# Patient Record
Sex: MALE | Race: WHITE | Employment: FULL TIME | URBAN - NONMETROPOLITAN AREA
[De-identification: names, ages, dates, MRNs, and addresses within clinical notes are randomized per-mention and may not be internally consistent; named-entity substitution may affect disease eponyms.]

---

## 2018-08-22 ENCOUNTER — OFFICE VISIT (OUTPATIENT)
Dept: ORTHOPEDIC SURGERY | Age: 58
End: 2018-08-22

## 2018-08-22 VITALS — WEIGHT: 257 LBS | BODY MASS INDEX: 30.34 KG/M2 | HEIGHT: 77 IN

## 2018-08-22 DIAGNOSIS — M25.511 ACUTE PAIN OF RIGHT SHOULDER: Primary | ICD-10-CM

## 2018-08-22 PROCEDURE — 99204 OFFICE O/P NEW MOD 45 MIN: CPT | Performed by: ORTHOPAEDIC SURGERY

## 2018-08-22 NOTE — PROGRESS NOTES
is equal bilaterally with supination pronation and flexion and extension  no winging no muscle atrophy. Special Tests:  Palpation demonstrates no swelling no effusion moderate pain. There is near full active and passive range of motion. Strength is weak and painful with internal rotation against resistance external rotation against resistance supraspinatus isolation against resistance. Shoulder shrug strength is 5 over 5 equal bilaterally. Radial ulnar and median nerve function is intact. Capillary refill is brisk. Sensation is intact from neck down to the fingers. Elbow motion finger and wrist motion is full equal bilaterally. Deep tendon reflexes of the Brachial radialis, biceps, tricepsAre all +2/4 equal bilaterally. Cervical spine range of motion is full without pain negative Spurling's test.  Load-and-shift test is negative. Crank test is negative. Apprehension and relocation is negative. Anterior and posterior glide are equal bilaterally. Negative sulcus sign. No signs of any significant multidirectional instability. There is no scapular winging. There is no muscle atrophy of the latissimus dorsi, the deltoid, the periscapular musculature,The trapezius musculature or the pectoralis musculature. Positive Neer's test, positive Abdi test, positive pain with crossarm elevation. Gait: normal gait     Reflex:    Deep tendon reflexes of the biceps, triceps, brachioradialis +2/4 equal bilaterally    Lower extremity reflexes:  +2/4 and equal bilaterally for patella and Achilles      Contralateral Shoulder exam: Palpation demonstrates no swelling no effusion no pain. There is full active and passive range of motion bilaterally. Strength is excellent with internal rotation against resistance external rotation against resistance supraspinatus isolation against resistance. Shoulder shrug strength is 5 over 5 equal bilaterally. Radial ulnar and median nerve function is intact.   Capillary refill is brisk. Elbow motion finger and wrist motion is full equal bilaterally. Deep tendon reflexes of the Brachial radialis, biceps, tricepsAre all +2/4 equal bilaterally. Cervical spine range of motion is full without pain negative Spurling's test.  Load-and-shift test is negative. Crank test is negative. Apprehension and relocation is negative. Anterior and posterior glide are equal bilaterally. Negative sulcus sign. No signs of any significant multidirectional instability. There is no scapular winging. There is no muscle atrophy of the latissimus dorsi, the deltoid, the periscapular musculature,The trapezius musculature or the pectoralis musculature. Negative Neer's test, negative Abdi test, no pain with crossarm elevation. Abduction --150 degrees  Flexion-- 180 degrees  Extension-- between 45-60 degrees  Latera/external  rotation --close to 90 degrees  Medial/ internal rotation -- between 70-90 degree    Cervical spine exam demonstrates no  Radiculopathy no reproduction of the symptomology. Range of motion is normal without pain or radiculopathy and does not cause shoulder pain. Cranial nerve exam:    1- smell-- patient states no Olfactory problem  2- visual acuity is intact  3- moves eyes, and pupils are reactive  4- extra-occular muscles are intact  5- facial sensation is intact no muscle atrophy  6- extra occular muscles are intact  7- mouth moist and facial expressions are intact  8- good hearing and no difficulty with recognition  9- patient has no difficulty swallowing  10- no difficulty breathing and no Gastrointestinal problems good cough   11- moves head with all motion and no swallowing problems  12- normal speech and tongue protrudes midline    Additional Examinations:  Left Upper Extremity: Examination of the left upper extremity does not show any tenderness, deformity or injury. Range of motion is unremarkable. There is no gross instability.   There are no rashes, ulcerations or lesions. Strength and tone are normal.  Thoracic Spine: Examination of the thoracic spine does not show any tenderness, deformity or injury. Range of motion is unremarkable. There is no gross instability. There are no rashes, ulcerations or lesions. Strength and tone are normal.  Neck: Examination of the neck does not show any tenderness, deformity or injury. Range of motion is unremarkable. There is no gross instability. There are no rashes, ulcerations or lesions. Strength and tone are normal.        IMPRESSION:    Diagnostic testing:  X-rays obtained and reviewed in office by myself : I reviewed multiple X-rays today of the right shoulder:  Anterior posterior, lateral, axillary:  Show no fracture, no dislocation, no signs of any masses or tumors, no significant glenohumeral arthritis, no significant a.c. 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 Office Procedures:  Orders Placed This Encounter   Procedures    XR SHOULDER RIGHT (MIN 2 VIEWS)       Previous Treatments:  Ice, rest, exercises, over-the-counter medications , X-ray, anti-inflammatories,    Differential Diagnoses: Impingement, AC joint osteoarthritis,  Rotator cuff tear, Labral tear, Instability, loose body,  Long head of bicep injury,  Glenohumeral osteoarthritis, AC joint separation, SLAP tear, Posterior labral tear, Anterior Labral tear, neck pathology, brachioplexis injury, muscle injury, neck radiculopathy, bone tumor, fracture,    Diagnosis rotator cuff tear right shoulder        Plan: (Medical Decision Making)    1. Medications - none at this time  2. PT - in the future  3. Further imaging - MRI  4. Follow up - after MRI       Markie Ortiz D.O. [address] [place] [address] 20 and Sports Medicine  [facility]  Board Certified  [employer] Team Physician      Disclaimer: \"This note was dictated with voice recognition software. Though review and correction are routine, we apologize for any errors. \"

## 2018-08-29 ENCOUNTER — OFFICE VISIT (OUTPATIENT)
Dept: ORTHOPEDIC SURGERY | Age: 58
End: 2018-08-29

## 2018-08-29 ENCOUNTER — TELEPHONE (OUTPATIENT)
Dept: ORTHOPEDIC SURGERY | Age: 58
End: 2018-08-29

## 2018-08-29 VITALS — HEIGHT: 77 IN | WEIGHT: 257 LBS | BODY MASS INDEX: 30.34 KG/M2

## 2018-08-29 DIAGNOSIS — M19.011 ARTHRITIS OF RIGHT ACROMIOCLAVICULAR JOINT: ICD-10-CM

## 2018-08-29 DIAGNOSIS — M75.111 INCOMPLETE TEAR OF RIGHT ROTATOR CUFF: ICD-10-CM

## 2018-08-29 DIAGNOSIS — M75.41 SUBACROMIAL IMPINGEMENT, RIGHT: Primary | ICD-10-CM

## 2018-08-29 DIAGNOSIS — M25.511 ACUTE PAIN OF RIGHT SHOULDER: Primary | ICD-10-CM

## 2018-08-29 DIAGNOSIS — M75.41 SUBACROMIAL IMPINGEMENT, RIGHT: ICD-10-CM

## 2018-08-29 DIAGNOSIS — M19.011 GLENOHUMERAL ARTHRITIS, RIGHT: ICD-10-CM

## 2018-08-29 PROCEDURE — 99214 OFFICE O/P EST MOD 30 MIN: CPT | Performed by: ORTHOPAEDIC SURGERY

## 2018-08-29 RX ORDER — OXYCODONE HYDROCHLORIDE 10 MG/1
10 TABLET ORAL EVERY 8 HOURS PRN
Qty: 21 TABLET | Refills: 0 | Status: SHIPPED | OUTPATIENT
Start: 2018-08-31 | End: 2018-09-07

## 2018-08-29 RX ORDER — MELOXICAM 15 MG/1
15 TABLET ORAL DAILY
Qty: 30 TABLET | Refills: 3 | Status: SHIPPED | OUTPATIENT
Start: 2018-08-31

## 2018-08-29 NOTE — TELEPHONE ENCOUNTER
Auth: NPR  Date: 8/31/18  Reference # [id number]  Type of SX: Outpatient  Location: [facility] [id number], [id number], [id number]   SX area: Rt shoulder

## 2018-08-29 NOTE — PROGRESS NOTES
8/29/18  History of Present Illness: Recheck evaluation of his right shoulder he's having significant dysfunction  Sri Edwards is a 65 y.o. male    Patient's chief complaint in the office today includes: Right shoulder pain and loss of motion    Location right Shoulder  Severity moderate  Duration just over 2 weeks  Associated sign/symptoms pain, loss of motion, loss of strength, inability to use it at all overhead or sleep    Medical History  Patient's medications, allergies, past medical, surgical, social and family histories were reviewed and updated as appropriate. Review of Systems  No rash  No numbness  No tingling  No fevers  No depression    Pertinent items are noted in HPI  Review of systems reviewed from Patient History Form dated on 8/22/18 and available in the patient's chart under the Media tab. No change in his medical history form                                         Examination    General Exam:   Vitals: Height 6' 5\" (1.956 m), weight 257 lb (116.6 kg). Constitutional: Patient is adequately groomed with no evidence of malnutrition  Mental Status: The patient is oriented to time, place and person. The patient's mood and affect are appropriate. PHYSICAL EXAM:      Shoulder Examination  Inspection:  No swelling, no deformity, no erythema, no drainage, no signs of infection     Palpation:  Palpation reveals no effusion minimal pain, no warmth,     Range of Motion:  genital range of motion with no crepitus passive motion to 95° of forward flextion    Strength:  Intact strength with internal and external rotation along with  supraspinatus isolation bilaterally, Shoulder shrug is 5 over 5 , cervical spine strength is excellent, flexion extension at the elbow is 5 over 5 wrist and hand strength is equal bilaterally no winging no muscle atrophy.    Palpation:  Lateral deltoid moderate pain to palpation  AC joint moderate pain to palopation  Mild pain Anterior pain.  There is full active and passive range of motion bilaterally. Strength is excellent with internal rotation against resistance external rotation against resistance supraspinatus isolation against resistance. Shoulder shrug strength is 5 over 5 equal bilaterally. Radial ulnar and median nerve function is intact. Capillary refill is brisk. Elbow motion finger and wrist motion is full equal bilaterally. Deep tendon reflexes of the Brachial radialis, biceps, tricepsAre all +2/4 equal bilaterally. Cervical spine range of motion is full without pain negative Spurling's test.  Load-and-shift test is negative. Crank test is negative. Apprehension and relocation is negative. Anterior and posterior glide are equal bilaterally. Negative sulcus sign. No signs of any significant multidirectional instability. There is no scapular winging. There is no muscle atrophy of the latissimus dorsi, the deltoid, the periscapular musculature,The trapezius musculature or the pectoralis musculature. Negative Neer's test, negative Abdi test, no pain with crossarm elevation. Abduction --150 degrees  Flexion-- 180 degrees  Extension-- between 45-60 degrees  Latera/external  rotation --close to 90 degrees  Medial/ internal rotation -- between 70-90 degree      Reflex: upper extremity: Deep tendon reflexes of the biceps, triceps, brachioradialis +2/4 equal bilaterally  Lower extremity: +2/4 and equal bilaterally for patella and Achilles    Additional Comments:       Cervical spine exam demonstrates no  Radiculopathy no reproduction of the symptomology. Range of motion is normal without pain or radiculopathy and does not cause shoulder pain. Additional Examinations:  Right Lower Extremity: Examination of the right lower extremity does not show any tenderness, deformity or injury. Range of motion is unremarkable. There is no gross instability. There are no rashes, ulcerations or lesions.   Strength and tone are normal.  Left Lower Extremity: Examination of the left lower extremity does not show any tenderness, deformity or injury. Range of motion is unremarkable. There is no gross instability. There are no rashes, ulcerations or lesions. Strength and tone are normal.  Left Upper Extremity: Examination of the left upper extremity does not show any tenderness, deformity or injury. Range of motion is unremarkable. There is no gross instability. There are no rashes, ulcerations or lesions. Strength and tone are normal.  Neck: Examination of the neck does not show any tenderness, deformity or injury. Range of motion is unremarkable. There is no gross instability. There are no rashes, ulcerations or lesions. Strength and tone are normal.    Past Surgical History:   Procedure Laterality Date    KNEE ARTHROSCOPY Right 11/1/13    PARTIAL MEDIAL MENISCECTOMY, SYNOVECTOMY, CHONDROPLASTY    NASAL FRACTURE SURGERY         Diagnostic Testing:      I independently and personally reviewed the films in the office today:  Views Right shoulder MRI reviewed in the office today    Body Part right shoulder  Impression right shoulder partial thickness rotator cuff tear, subacromial impingement and distal clavicle arthritis    Assessment:  The MRI does show significant debris in the joint some mild arthritic changes subacromial impingement, distal clavicle arthritis and a greater than 1 cm area of 50% partial-thickness rotator cuff tear    Impression: Due to his severe pain and dysfunction 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 Previous Treatments:  X-ray, MRI, exercises, injections, anti-inflammatories, ice    Treatment Plan:  I spent 15+ minutes, face to face, with the patient discussing and answering questions regarding the risks, benefits, and complications of shoulder arthroscopy surgery in detail.  We

## 2018-08-31 ENCOUNTER — HOSPITAL ENCOUNTER (OUTPATIENT)
Dept: SURGERY | Age: 58
Discharge: HOME | End: 2018-08-31
Attending: ORTHOPAEDIC SURGERY | Admitting: ORTHOPAEDIC SURGERY

## 2018-08-31 VITALS
TEMPERATURE: 97.3 F | RESPIRATION RATE: 16 BRPM | DIASTOLIC BLOOD PRESSURE: 72 MMHG | OXYGEN SATURATION: 99 % | BODY MASS INDEX: 28.15 KG/M2 | SYSTOLIC BLOOD PRESSURE: 133 MMHG | HEART RATE: 64 BPM | WEIGHT: 238.44 LBS | HEIGHT: 77 IN

## 2018-08-31 RX ORDER — OXYCODONE HYDROCHLORIDE 5 MG/1
5 TABLET ORAL PRN
Status: COMPLETED | OUTPATIENT
Start: 2018-08-31 | End: 2018-08-31

## 2018-08-31 RX ORDER — HYDROMORPHONE HCL 110MG/55ML
0.5 PATIENT CONTROLLED ANALGESIA SYRINGE INTRAVENOUS EVERY 5 MIN PRN
Status: DISCONTINUED | OUTPATIENT
Start: 2018-08-31 | End: 2018-09-01 | Stop reason: HOSPADM

## 2018-08-31 RX ORDER — HYDROMORPHONE HCL 110MG/55ML
0.25 PATIENT CONTROLLED ANALGESIA SYRINGE INTRAVENOUS EVERY 5 MIN PRN
Status: DISCONTINUED | OUTPATIENT
Start: 2018-08-31 | End: 2018-09-01 | Stop reason: HOSPADM

## 2018-08-31 RX ORDER — SODIUM CHLORIDE 9 MG/ML
INJECTION, SOLUTION INTRAVENOUS CONTINUOUS
Status: DISCONTINUED | OUTPATIENT
Start: 2018-08-31 | End: 2018-09-01 | Stop reason: HOSPADM

## 2018-08-31 RX ORDER — FENTANYL CITRATE 50 UG/ML
50 INJECTION, SOLUTION INTRAMUSCULAR; INTRAVENOUS EVERY 5 MIN PRN
Status: DISCONTINUED | OUTPATIENT
Start: 2018-08-31 | End: 2018-09-01 | Stop reason: HOSPADM

## 2018-08-31 RX ORDER — ACETAMINOPHEN 325 MG/1
650 TABLET ORAL EVERY 4 HOURS PRN
Status: DISCONTINUED | OUTPATIENT
Start: 2018-08-31 | End: 2018-09-01 | Stop reason: HOSPADM

## 2018-08-31 RX ORDER — SODIUM CHLORIDE 0.9 % (FLUSH) 0.9 %
10 SYRINGE (ML) INJECTION PRN
Status: DISCONTINUED | OUTPATIENT
Start: 2018-08-31 | End: 2018-09-01 | Stop reason: HOSPADM

## 2018-08-31 RX ORDER — OXYCODONE HYDROCHLORIDE 5 MG/1
10 TABLET ORAL PRN
Status: COMPLETED | OUTPATIENT
Start: 2018-08-31 | End: 2018-08-31

## 2018-08-31 RX ORDER — LABETALOL HYDROCHLORIDE 5 MG/ML
5 INJECTION, SOLUTION INTRAVENOUS EVERY 10 MIN PRN
Status: DISCONTINUED | OUTPATIENT
Start: 2018-08-31 | End: 2018-09-01 | Stop reason: HOSPADM

## 2018-08-31 RX ORDER — SODIUM CHLORIDE 0.9 % (FLUSH) 0.9 %
10 SYRINGE (ML) INJECTION EVERY 12 HOURS SCHEDULED
Status: DISCONTINUED | OUTPATIENT
Start: 2018-08-31 | End: 2018-09-01 | Stop reason: HOSPADM

## 2018-08-31 RX ORDER — MEPERIDINE HYDROCHLORIDE 25 MG/ML
12.5 INJECTION INTRAMUSCULAR; INTRAVENOUS; SUBCUTANEOUS EVERY 5 MIN PRN
Status: DISCONTINUED | OUTPATIENT
Start: 2018-08-31 | End: 2018-09-01 | Stop reason: HOSPADM

## 2018-08-31 RX ORDER — DIPHENHYDRAMINE HYDROCHLORIDE 50 MG/ML
12.5 INJECTION INTRAMUSCULAR; INTRAVENOUS
Status: ACTIVE | OUTPATIENT
Start: 2018-08-31 | End: 2018-08-31

## 2018-08-31 RX ORDER — PROMETHAZINE HYDROCHLORIDE 25 MG/ML
6.25 INJECTION, SOLUTION INTRAMUSCULAR; INTRAVENOUS PRN
Status: DISCONTINUED | OUTPATIENT
Start: 2018-08-31 | End: 2018-09-01 | Stop reason: HOSPADM

## 2018-08-31 RX ORDER — CEFAZOLIN SODIUM 2 G/100ML
2 INJECTION, SOLUTION INTRAVENOUS
Status: COMPLETED | OUTPATIENT
Start: 2018-08-31 | End: 2018-08-31

## 2018-08-31 RX ADMIN — OXYCODONE HYDROCHLORIDE 10 MG: 5 TABLET ORAL at 12:55

## 2018-08-31 RX ADMIN — CEFAZOLIN SODIUM 2 G: 2 INJECTION, SOLUTION INTRAVENOUS at 10:39

## 2018-08-31 RX ADMIN — SODIUM CHLORIDE: 9 INJECTION, SOLUTION INTRAVENOUS at 09:35

## 2018-08-31 ASSESSMENT — PAIN - FUNCTIONAL ASSESSMENT: PAIN_FUNCTIONAL_ASSESSMENT: 0-10

## 2018-08-31 ASSESSMENT — PAIN DESCRIPTION - DESCRIPTORS: DESCRIPTORS: ACHING;SHARP;SHOOTING;THROBBING

## 2018-08-31 ASSESSMENT — PAIN SCALES - GENERAL: PAINLEVEL_OUTOF10: 7

## 2018-08-31 NOTE — OP NOTE
procedure and recovery along with postoperative follow-up plans the patient was brought back to the operative suite put on the operative table in the supine position. Following the patient's general anesthesia. The patient was sat up in the beachchair positioner. A pillow was put under the knees and taped in place the flank pads were tightened and a nonoperative arm pillow was applied. The neck and head positioner was carefully adjusted and tightened for optimal safety. At this point the operative arm was scrubbed with alcohol and Betadine and injected with 30 mL of Marcaine and 30 mL of lidocaine lidocaine did have epinephrine. I now performed a full physical exam of the shoulder under anesthesia for range of motion internal rotation, external rotation, forward flexion, I also tested anterior and posterior glide I tested for sulcus sign bilaterally and I also tested for instability. Following this the entire upper extremity was scrubbed with DuraPrep and draped in a sterile manner. The arm was hooked up to [facility] and with gentle traction a posterior portal was placed using a sharp scalpel and a blunt trocar entering the joint without any difficulty. With careful visualization of the entire intra-articular joint a anterior portal was made with an 18-gauge spinal needle sharp scalpel and a blunt trocar. The probe was used to probe all of the intra-articular pathology including the labrum, the long head of the biceps, the rotator cuff, the glenohumeral joint, the inferior recess, and the chondral surface. After the initial diagnostic arthroscopy I went ahead and used the shaver and the bipolar through the anterior portal to remove hypertrophic synovitis, labral fraying, and undersurface rotator cuff fraying. The synovitis was removed from the inferior recess posterior to the labrum superior to the labrum and anterior to the labrum.   The labral tissue was smoothed off with a shaver with a 5.5 mm barrel bur. The subacromial space was visualized through the anterior portal lateral portal and the posterior portal into bleeding tissue was cauterized any loose debris was removed with the shaver once all this was performed I removed all instruments from the subacromial space. Having seeing that the undersurface rotator cuff had a partial thickness tear I went ahead and augmented the rotator cuff with a large Regeneten augmentation graft using soft tissue staples at each corner and then in the middle in the center. This was a nice stable construct right over the rotator cuff partial-thickness tear. I irrigated copiously and removed all instruments from the subacromial space. At this point all instruments were removed from the shoulder each portal was closed with a simple interrupted suture. Each portal was covered with a sterile Band-Aid sterile gauze and ABD then tape. The patient was brought to recovery in stable condition. The Patient was given typewritten instructions for postoperative care. The patient was given exercises, pain medication, anti-inflammatory medication, a follow-up appointment in the office in 1 week. The patient was given instructions and a number to call if there is any concerns or problems. The patient will be discharged to home from the postoperative area when in stable condition and understands the instructions. _____________________         Yaneli Ortiz MS, DO         Orthopedic Surgeon          [facility] trained         Board-certified         Team Physician for [employer]

## 2018-08-31 NOTE — PROGRESS NOTES
CLINICAL PHARMACY NOTE: MEDS TO [address] Select Patient?: No  Total # of Prescriptions Filled: 2   The following medications were delivered to the patient:  · Oxycodone 5  · Mobic 15  Total # of Interventions Completed: 0  Time Spent (min): 15    Additional Documentation:  Patient's wife signed for prescriptions

## 2018-08-31 NOTE — ANESTHESIA POST-OP
[facility] Anesthesiology  Post-Anesthesia Note       Name:  Thomas Peterson                                         Age:  65 y.o. MRN:  [id number]     Last Vitals & Oxygen Saturation: /72   Pulse 64   Temp 97.3 °F (36.3 °C)   Resp 16   Ht 6' 5\" (1.956 m)   Wt 238 lb 7 oz (108.2 kg)   SpO2 99%   BMI 28.27 kg/m²   Patient Vitals for the past 4 hrs:   BP Temp Temp src Pulse Resp SpO2   08/31/18 1345 133/72 97.3 °F (36.3 °C) - 64 16 99 %   08/31/18 1315 137/74 97 °F (36.1 °C) - 59 16 99 %   08/31/18 1250 121/89 97.3 °F (36.3 °C) - 59 15 100 %   08/31/18 1245 122/74 - - 62 14 98 %   08/31/18 1240 119/71 - - 63 14 100 %   08/31/18 1236 120/66 97 °F (36.1 °C) Temporal 64 14 100 %       Level of consciousness:  Awake, alert    Respiratory: Respirations easy, no distress. Stable. Cardiovascular: Hemodynamically stable. Hydration: Adequate. PONV: Adequately managed. Post-op pain: Adequately controlled. Post-op assessment: Tolerated anesthetic well without complication. Complications:  None.     Sobia Link MD  August 31, 2018   2:45 PM

## 2018-09-04 DIAGNOSIS — M75.111 INCOMPLETE TEAR OF RIGHT ROTATOR CUFF: Primary | ICD-10-CM

## 2018-09-04 DIAGNOSIS — M19.011 ARTHRITIS OF RIGHT ACROMIOCLAVICULAR JOINT: ICD-10-CM

## 2018-09-04 DIAGNOSIS — M75.41 SUBACROMIAL IMPINGEMENT, RIGHT: ICD-10-CM

## 2018-09-04 DIAGNOSIS — M19.011 GLENOHUMERAL ARTHRITIS, RIGHT: ICD-10-CM

## 2018-09-05 ENCOUNTER — OFFICE VISIT (OUTPATIENT)
Dept: ORTHOPEDIC SURGERY | Age: 58
End: 2018-09-05

## 2018-09-05 VITALS — BODY MASS INDEX: 28.19 KG/M2 | HEIGHT: 77 IN | WEIGHT: 238.76 LBS

## 2018-09-05 DIAGNOSIS — M75.111 INCOMPLETE TEAR OF RIGHT ROTATOR CUFF: Primary | ICD-10-CM

## 2018-09-05 DIAGNOSIS — M19.011 ARTHRITIS OF RIGHT ACROMIOCLAVICULAR JOINT: ICD-10-CM

## 2018-09-05 DIAGNOSIS — M75.41 SUBACROMIAL IMPINGEMENT, RIGHT: ICD-10-CM

## 2018-09-05 PROCEDURE — 99024 POSTOP FOLLOW-UP VISIT: CPT | Performed by: ORTHOPAEDIC SURGERY

## 2018-09-11 ENCOUNTER — HOSPITAL ENCOUNTER (OUTPATIENT)
Dept: PHYSICAL THERAPY | Age: 58
Discharge: HOME | End: 2018-09-12
Admitting: ORTHOPAEDIC SURGERY

## 2018-09-12 NOTE — FLOWSHEET NOTE
[address] [place] [address]  Phone: [phone] Fax: [phone]      Physical Therapy Daily Treatment Note  Date:  2018    Patient Name:  Francy Sam    :  1960  MRN: [id number]  Restrictions/Precautions:    Medical/Treatment Diagnosis Information:  · Diagnosis: M75.111 (ICD-10-CM) - Incomplete tear of right rotator cuff  · Treatment Diagnosis: M75.111 (ICD-10-CM) - Incomplete tear of right rotator cuff  Insurance/Certification information:     Physician Information:  Referring Practitioner: Dr Denis Ram of care signed (Y/N):     Date of Patient follow up with Physician:     G-Code (if applicable):      Date G-Code Applied:    PT G-Codes  Functional Assessment Tool Used: FOTO  Score: 45  Functional Limitation: Carrying, moving and handling objects  Carrying, Moving and Handling Objects Current Status (): At least 40 percent but less than 60 percent impaired, limited or restricted  Carrying, Moving and Handling Objects Goal Status (): At least 1 percent but less than 20 percent impaired, limited or restricted    Progress Note: [x]  Yes  []  No  Next due by: Visit #10      Latex Allergy:  [x]NO      []YES  Preferred Language for Healthcare:   [x]English       []other:    Visit # Insurance Allowable   2 20     Pain level:  0-5/10     SUBJECTIVE:  Patient reports out of sling this am per MD.  He is doing well, minor soreness with wrong movements.   Unable to attend Thursday due to another obligation    OBJECTIVE: See eval  Observation:   Test measurements:      RESTRICTIONS/PRECAUTIONS: RC repair w augment patch 18    Exercises/Interventions:   Therapeutic Ex Sets/sec Reps Notes   UBE      Pendulum/Ball rolls      Cane AAROM flex/press 1 10 ER at 45 deg and flex   3 way Isomet      T- band Row/pinch      T- band lower pinch      T- band ER activation      Supine SA punch      SL ER/SL punch      Prone Rows/ext      Prone HAB/Prone Flex      Seat Table slides/Wall Slides 1 20    Seated HH Depression      No Money      Scap Wall Lat touches/wall walks            Standing flex/scap      Standing Punch      Lawnmower                              Manual Intervention      Shld /GH Mobs      Post Cap mobs      Thoracic/Rib manipualtion      CT MT/Mobs      PROM MT 25 min     Elbow mobs            NMR re-education      T-spine Ext      GH depress/compress      Scap/GH NMR      Body blade      Wall ball roll      Wall Ball bounce      Ball drops      Mari Scap Bio      Floor Snow angels-sliders                      Therapeutic Exercise and NMR EXR  [x] (84073) Provided verbal/tactile cueing for activities related to strengthening, flexibility, endurance, ROM  for improvements in scapular, scapulothoracic and UE control with self care, reaching, carrying, lifting, house/yardwork, driving/computer work. [x] (51645) Provided verbal/tactile cueing for activities related to improving balance, coordination, kinesthetic sense, posture, motor skill, proprioception  to assist with  scapular, scapulothoracic and UE control with self care, reaching, carrying, lifting, house/yardwork, driving/computer work. Therapeutic Activities:    [] (25074 or 49860) Provided verbal/tactile cueing for activities related to improving balance, coordination, kinesthetic sense, posture, motor skill, proprioception and motor activation to allow for proper function of scapular, scapulothoracic and UE control with self care, carrying, lifting, driving/computer work.      Home Exercise Program:    [x] (20670) Reviewed/Progressed HEP activities related to strengthening, flexibility, endurance, ROM of scapular, scapulothoracic and UE control with self care, reaching, carrying, lifting, house/yardwork, driving/computer work  [] (60554) Reviewed/Progressed HEP activities related to improving balance, coordination, kinesthetic sense, posture, motor skill, proprioception of scapular, scapulothoracic and UE control with self care, reaching, carrying, lifting, house/yardwork, driving/computer work      Manual Treatments:  PROM / STM / Oscillations-Mobs:  G-I, II, III, IV (PA's, Inf., Post.)  [] (90523) Provided manual therapy to mobilize soft tissue/joints of cervical/CT, scapular GHJ and UE for the purpose of modulating pain, promoting relaxation,  increasing ROM, reducing/eliminating soft tissue swelling/inflammation/restriction, improving soft tissue extensibility and allowing for proper ROM for normal function with self care, reaching, carrying, lifting, house/yardwork, driving/computer work    Modalities:      Charges:  Timed Code Treatment Minutes: 45   Total Treatment Minutes: 55     [] EVAL (LOW) 42475 (typically 20 minutes face-to-face)  [] EVAL (MOD) 90341 (typically 30 minutes face-to-face)  [] EVAL (HIGH) 21406 (typically 45 minutes face-to-face)  [] RE-EVAL     [x] DZ(54661) x      [] IONTO  [] NMR (91267) x      [] VASO  [x] Manual (09293) x       [] Other:  [] TA x       [] Mech Traction (97151)  [] ES(attended) (91514)      [] ES (un) (41111):     GOALS:    Patient stated goal: return to sport     Therapist goals for Patient:   Short Term Goals: To be achieved in: 2 weeks  1. Independent in HEP and progression per patient tolerance, in order to prevent re-injury. 2. Patient will have a decrease in pain to facilitate improvement in movement, function, and ADLs as indicated by Functional Deficits.     Long Term Goals: To be achieved in: 8 weeks  1. Disability index score of 75% or more on FOTOto assist with reaching prior level of function. 2. Patient will demonstrate increased AROM to [facility] to allow for proper joint functioning as indicated by patients Functional Deficits.    3. Patient will demonstrate an increase in Strength to good scapular and core control, w/in 5lbs HHD for UE to allow for proper functional mobility as indicated by patients

## 2018-09-18 ENCOUNTER — HOSPITAL ENCOUNTER (OUTPATIENT)
Dept: PHYSICAL THERAPY | Age: 58
Discharge: HOME | End: 2018-09-19
Admitting: ORTHOPAEDIC SURGERY

## 2018-09-19 NOTE — FLOWSHEET NOTE
slides/Wall Slides 1 20    Seated HH Depression      No Money      Scap Wall Lat touches/wall walks            Standing flex/scap      Standing Punch      Lawnmower                              Manual Intervention      Shld /GH Mobs 5 min  Inf glide grade II   Post Cap mobs      Thoracic/Rib manipualtion      CT MT/Mobs      PROM MT 20 min     Elbow mobs            NMR re-education      T-spine Ext      GH depress/compress      Scap/GH NMR      Body blade      Wall ball roll      Wall Ball bounce      Ball drops      Mari Scap Bio      Floor Snow angels-sliders                      Therapeutic Exercise and NMR EXR  [x] (53959) Provided verbal/tactile cueing for activities related to strengthening, flexibility, endurance, ROM  for improvements in scapular, scapulothoracic and UE control with self care, reaching, carrying, lifting, house/yardwork, driving/computer work. [x] (45341) Provided verbal/tactile cueing for activities related to improving balance, coordination, kinesthetic sense, posture, motor skill, proprioception  to assist with  scapular, scapulothoracic and UE control with self care, reaching, carrying, lifting, house/yardwork, driving/computer work. Therapeutic Activities:    [] (38802 or 83118) Provided verbal/tactile cueing for activities related to improving balance, coordination, kinesthetic sense, posture, motor skill, proprioception and motor activation to allow for proper function of scapular, scapulothoracic and UE control with self care, carrying, lifting, driving/computer work.      Home Exercise Program:    [x] (92380) Reviewed/Progressed HEP activities related to strengthening, flexibility, endurance, ROM of scapular, scapulothoracic and UE control with self care, reaching, carrying, lifting, house/yardwork, driving/computer work  [] (47782) Reviewed/Progressed HEP activities related to improving balance, coordination, kinesthetic sense, posture, motor skill, proprioception of scapular, will return to basketball functional activities without increased symptoms or restriction. 5. Sleep without pain(patient specific functional goal)    Progression Towards Functional goals:  [] Patient is progressing as expected towards functional goals listed. [] Progression is slowed due to complexities listed. [] Progression has been slowed due to co-morbidities. [x] Plan just implemented, too soon to assess goals progression  [] Other:     ASSESSMENT:  Pt PROM much less guarded today. Flexion mobility improving, started loading cuff with isometrics with good tolerance.     Return to Play: (if applicable)   []  Stage 1: Intro to Strength   []  Stage 2: Dynamic Strength and Intro to Plyometrics   []  Stage 3: Advanced Plyometrics and Intro to Throwing   []  Stage 4: Sport specific Training/Return to Sport     []  Ready to Return to Play, [employer]   []  Not Ready for Return to Sports   Comments:      Treatment/Activity Tolerance:  [x] Patient tolerated treatment well [] Patient limited by fatique  [] Patient limited by pain  [] Patient limited by other medical complications  [] Other:     Prognosis: [x] Good [] Fair  [] Poor    Patient Requires Follow-up: [x] Yes  [] No    PLAN: See eval  [] Continue per plan of care [] Alter current plan (see comments)  [x] Plan of care initiated [] Hold pending MD visit [] Discharge    Electronically signed by: Jesse Wiley PT

## 2018-09-20 ENCOUNTER — HOSPITAL ENCOUNTER (OUTPATIENT)
Dept: PHYSICAL THERAPY | Age: 58
Discharge: HOME | End: 2018-09-21
Admitting: ORTHOPAEDIC SURGERY

## 2018-09-21 NOTE — FLOWSHEET NOTE
ER/SL punch 2 15 0 lb   Prone Rows/ext 1 20 0 lb   Prone HAB/Prone Flex      Seat Table slides/Wall Slides 1 20    Seated HH Depression      No Money      Scap Wall Lat touches/wall walks            Standing flex/scap      Standing Punch      Lawnmower 1 20 3 lb                           Manual Intervention      Shld /GH Mobs 0 min  Inf glide grade II   Post Cap mobs      Thoracic/Rib manipualtion      CT MT/Mobs      PROM MT 15 min     Elbow mobs            NMR re-education      T-spine Ext      GH depress/compress      Scap/GH NMR      Body blade      Wall ball roll      Wall Ball bounce      Ball drops      Mari Scap Bio      Floor Snow angels-sliders                      Therapeutic Exercise and NMR EXR  [x] (08722) Provided verbal/tactile cueing for activities related to strengthening, flexibility, endurance, ROM  for improvements in scapular, scapulothoracic and UE control with self care, reaching, carrying, lifting, house/yardwork, driving/computer work. [x] (58632) Provided verbal/tactile cueing for activities related to improving balance, coordination, kinesthetic sense, posture, motor skill, proprioception  to assist with  scapular, scapulothoracic and UE control with self care, reaching, carrying, lifting, house/yardwork, driving/computer work. Therapeutic Activities:    [] (53481 or 68489) Provided verbal/tactile cueing for activities related to improving balance, coordination, kinesthetic sense, posture, motor skill, proprioception and motor activation to allow for proper function of scapular, scapulothoracic and UE control with self care, carrying, lifting, driving/computer work.      Home Exercise Program:    [x] (95118) Reviewed/Progressed HEP activities related to strengthening, flexibility, endurance, ROM of scapular, scapulothoracic and UE control with self care, reaching, carrying, lifting, house/yardwork, driving/computer work  [] (29551) Reviewed/Progressed HEP activities related to to allow for proper functional mobility as indicated by patients Functional Deficits. 4. Patient will return to basketball functional activities without increased symptoms or restriction. 5. Sleep without pain(patient specific functional goal)    Progression Towards Functional goals:  [] Patient is progressing as expected towards functional goals listed. [] Progression is slowed due to complexities listed. [] Progression has been slowed due to co-morbidities. [x] Plan just implemented, too soon to assess goals progression  [] Other:     ASSESSMENT:  ROM progressing well. Mild activation of cuff in neutral today.   Good session    Return to Play: (if applicable)   []  Stage 1: Intro to Strength   []  Stage 2: Dynamic Strength and Intro to Plyometrics   []  Stage 3: Advanced Plyometrics and Intro to Throwing   []  Stage 4: Sport specific Training/Return to Sport     []  Ready to Return to Play, [employer]   []  Not Ready for Return to Sports   Comments:      Treatment/Activity Tolerance:  [x] Patient tolerated treatment well [] Patient limited by fatique  [] Patient limited by pain  [] Patient limited by other medical complications  [] Other:     Prognosis: [x] Good [] Fair  [] Poor    Patient Requires Follow-up: [x] Yes  [] No    PLAN: See eval  [] Continue per plan of care [] Alter current plan (see comments)  [x] Plan of care initiated [] Hold pending MD visit [] Discharge    Electronically signed by: Graciela Sainz PT

## 2018-09-25 ENCOUNTER — HOSPITAL ENCOUNTER (OUTPATIENT)
Dept: PHYSICAL THERAPY | Age: 58
Setting detail: THERAPIES SERIES
Discharge: HOME | End: 2018-09-25
Admitting: ORTHOPAEDIC SURGERY
Payer: COMMERCIAL

## 2018-09-25 PROCEDURE — 97140 MANUAL THERAPY 1/> REGIONS: CPT

## 2018-09-25 PROCEDURE — 97110 THERAPEUTIC EXERCISES: CPT

## 2018-09-25 NOTE — FLOWSHEET NOTE
[facility] ER/IR   T- band Row/pinch 1 20 blue   T- band lower pinch 1 20 Blue, cues for scap retraction   T- band ER activation To neutral   Supine rhythmic stabilization 10s 3 90 deg   Supine SA punch 5s 15    SL ER/SL punch 2 10 0 lb   Prone Rows/ext 1 20 0 lb   Prone HAB/Prone Flex      Seat Table slides/Wall Slides 1 20    Seated HH Depression 5s 15    No Money      Scap Wall Lat touches/wall walks            Standing flex/scap      Standing Punch      Lawnmower 1 20 3 lb                           Manual Intervention      Shld /GH Mobs 0 min  Inf glide grade II   Post Cap mobs      Thoracic/Rib manipualtion      CT MT/Mobs      PROM MT 15 min     Elbow mobs            NMR re-education      T-spine Ext      GH depress/compress      Scap/GH NMR      Body blade      Wall ball roll      Wall Ball bounce      Ball drops      Mari Scap Bio      Floor Snow angels-sliders                      Therapeutic Exercise and NMR EXR  [x] (05912) Provided verbal/tactile cueing for activities related to strengthening, flexibility, endurance, ROM  for improvements in scapular, scapulothoracic and UE control with self care, reaching, carrying, lifting, house/yardwork, driving/computer work. [x] (28463) Provided verbal/tactile cueing for activities related to improving balance, coordination, kinesthetic sense, posture, motor skill, proprioception  to assist with  scapular, scapulothoracic and UE control with self care, reaching, carrying, lifting, house/yardwork, driving/computer work. Therapeutic Activities:    [] (20366 or 04554) Provided verbal/tactile cueing for activities related to improving balance, coordination, kinesthetic sense, posture, motor skill, proprioception and motor activation to allow for proper function of scapular, scapulothoracic and UE control with self care, carrying, lifting, driving/computer work.      Home Exercise Program:    [x] (28538) Reviewed/Progressed HEP activities related to strengthening,

## 2018-09-26 ENCOUNTER — OFFICE VISIT (OUTPATIENT)
Dept: ORTHOPEDIC SURGERY | Age: 58
End: 2018-09-26

## 2018-09-26 VITALS
BODY MASS INDEX: 28.1 KG/M2 | SYSTOLIC BLOOD PRESSURE: 130 MMHG | DIASTOLIC BLOOD PRESSURE: 78 MMHG | WEIGHT: 238 LBS | HEIGHT: 77 IN

## 2018-09-26 DIAGNOSIS — M75.111 INCOMPLETE TEAR OF RIGHT ROTATOR CUFF: Primary | ICD-10-CM

## 2018-09-26 DIAGNOSIS — M75.41 SUBACROMIAL IMPINGEMENT, RIGHT: ICD-10-CM

## 2018-09-26 DIAGNOSIS — M19.011 ARTHRITIS OF RIGHT ACROMIOCLAVICULAR JOINT: ICD-10-CM

## 2018-09-26 PROCEDURE — 99024 POSTOP FOLLOW-UP VISIT: CPT | Performed by: ORTHOPAEDIC SURGERY

## 2018-09-27 ENCOUNTER — HOSPITAL ENCOUNTER (OUTPATIENT)
Dept: PHYSICAL THERAPY | Age: 58
Setting detail: THERAPIES SERIES
Discharge: HOME | End: 2018-09-27
Admitting: ORTHOPAEDIC SURGERY
Payer: COMMERCIAL

## 2018-09-27 PROCEDURE — 97140 MANUAL THERAPY 1/> REGIONS: CPT

## 2018-09-27 PROCEDURE — 97110 THERAPEUTIC EXERCISES: CPT

## 2018-09-27 NOTE — FLOWSHEET NOTE
Red To neutral   Supine rhythmic stabilization 10s 3 90 deg   Supine SA punch 5s 15    SL ER/SL punch 2 10 0 lb   Prone Rows/ext 1 20 0 lb   Prone HAB/Prone Flex      Seat Table slides/Wall Slides 1 20    Seated HH Depression 5s 15    No Money      Scap Wall Lat touches/wall walks            Standing flex/scap      Standing Punch      Lawnmower 1 20 3 lb                           Manual Intervention      Shld /GH Mobs 0 min  Inf glide grade II   Post Cap mobs      Thoracic/Rib manipualtion      CT MT/Mobs      PROM MT 15 min     Elbow mobs            NMR re-education      T-spine Ext      GH depress/compress      Scap/GH NMR      Body blade      Wall ball roll      Wall Ball bounce      Ball drops      Mari Scap Bio      Floor Snow angels-sliders                      Therapeutic Exercise and NMR EXR  [x] (38557) Provided verbal/tactile cueing for activities related to strengthening, flexibility, endurance, ROM  for improvements in scapular, scapulothoracic and UE control with self care, reaching, carrying, lifting, house/yardwork, driving/computer work. [x] (01796) Provided verbal/tactile cueing for activities related to improving balance, coordination, kinesthetic sense, posture, motor skill, proprioception  to assist with  scapular, scapulothoracic and UE control with self care, reaching, carrying, lifting, house/yardwork, driving/computer work. Therapeutic Activities:    [] (98158 or 29519) Provided verbal/tactile cueing for activities related to improving balance, coordination, kinesthetic sense, posture, motor skill, proprioception and motor activation to allow for proper function of scapular, scapulothoracic and UE control with self care, carrying, lifting, driving/computer work.      Home Exercise Program:    [x] (99998) Reviewed/Progressed HEP activities related to strengthening, flexibility, endurance, ROM of scapular, scapulothoracic and UE control with self care, reaching, carrying, lifting, house/yardwork, driving/computer work  [] (05666) Reviewed/Progressed HEP activities related to improving balance, coordination, kinesthetic sense, posture, motor skill, proprioception of scapular, scapulothoracic and UE control with self care, reaching, carrying, lifting, house/yardwork, driving/computer work      Manual Treatments:  PROM / STM / Oscillations-Mobs:  G-I, II, III, IV (PA's, Inf., Post.)  [] ([id number]) Provided manual therapy to mobilize soft tissue/joints of cervical/CT, scapular GHJ and UE for the purpose of modulating pain, promoting relaxation,  increasing ROM, reducing/eliminating soft tissue swelling/inflammation/restriction, improving soft tissue extensibility and allowing for proper ROM for normal function with self care, reaching, carrying, lifting, house/yardwork, driving/computer work    Modalities:  Ice bag x 10 min. Charges:  Timed Code Treatment Minutes: 45   Total Treatment Minutes: 55     [] EVAL (LOW) 89161 (typically 20 minutes face-to-face)  [] EVAL (MOD) 04641 (typically 30 minutes face-to-face)  [] EVAL (HIGH) 62508 (typically 45 minutes face-to-face)  [] RE-EVAL     [x] OA(19804) x  2   [] IONTO  [] NMR (20203) x      [] VASO  [x] Manual (75495) x  1    [] Other:  [] TA x       [] Mech Traction (78388)  [] ES(attended) (41901)      [] ES (un) (68734):     GOALS:    Patient stated goal: return to sport     Therapist goals for Patient:   Short Term Goals: To be achieved in: 2 weeks  1. Independent in HEP and progression per patient tolerance, in order to prevent re-injury. 2. Patient will have a decrease in pain to facilitate improvement in movement, function, and ADLs as indicated by Functional Deficits.     Long Term Goals: To be achieved in: 8 weeks  1. Disability index score of 75% or more on FOTOto assist with reaching prior level of function.    2. Patient will demonstrate increased AROM to [facility] to allow for proper joint functioning as indicated by patients Functional

## 2018-10-02 ENCOUNTER — HOSPITAL ENCOUNTER (OUTPATIENT)
Dept: PHYSICAL THERAPY | Age: 58
Setting detail: THERAPIES SERIES
Discharge: HOME | End: 2018-10-02
Admitting: ORTHOPAEDIC SURGERY
Payer: COMMERCIAL

## 2018-10-02 PROCEDURE — 97110 THERAPEUTIC EXERCISES: CPT

## 2018-10-02 PROCEDURE — 97140 MANUAL THERAPY 1/> REGIONS: CPT

## 2018-10-02 NOTE — FLOWSHEET NOTE
activation 1 15 Red To neutral   Supine rhythmic stabilization 10s 3 90 deg   Supine SA punch 5s 15    SL ER/SL punch 2 10 2 lb ER/0 lb   Prone Rows/ext 1 20 2 lb   Prone HAB/Prone Flex 1 12 0 lb   Seat Table slides/Wall Slides 1 20    Seated HH Depression 5s 15    No Money      Scap Wall Lat touches/wall walks      Hor add st 20 sec 4    Standing flex/scap      Standing Punch      Lawnmower 1 20 3 lb   Ball rolls 90 deg  1 20 Small green ball                     Manual Intervention      Shld /GH Mobs 0 min  Inf glide grade II   Post Cap mobs      Thoracic/Rib manipualtion      CT MT/Mobs      PROM MT 15 min     Elbow mobs            NMR re-education      T-spine Ext      GH depress/compress      Scap/GH NMR      Body blade      Wall ball roll      Wall Ball bounce      Ball drops      Mari Scap Bio      Floor Snow angels-sliders                      Therapeutic Exercise and NMR EXR  [x] (26692) Provided verbal/tactile cueing for activities related to strengthening, flexibility, endurance, ROM  for improvements in scapular, scapulothoracic and UE control with self care, reaching, carrying, lifting, house/yardwork, driving/computer work. [x] (64449) Provided verbal/tactile cueing for activities related to improving balance, coordination, kinesthetic sense, posture, motor skill, proprioception  to assist with  scapular, scapulothoracic and UE control with self care, reaching, carrying, lifting, house/yardwork, driving/computer work. Therapeutic Activities:    [] (40857 or 57144) Provided verbal/tactile cueing for activities related to improving balance, coordination, kinesthetic sense, posture, motor skill, proprioception and motor activation to allow for proper function of scapular, scapulothoracic and UE control with self care, carrying, lifting, driving/computer work.      Home Exercise Program:    [x] (33416) Reviewed/Progressed HEP activities related to strengthening, flexibility, endurance, ROM of scapular,

## 2018-10-09 ENCOUNTER — HOSPITAL ENCOUNTER (OUTPATIENT)
Dept: PHYSICAL THERAPY | Age: 58
Setting detail: THERAPIES SERIES
Discharge: HOME | End: 2018-10-09
Admitting: ORTHOPAEDIC SURGERY
Payer: COMMERCIAL

## 2018-10-09 PROCEDURE — 97140 MANUAL THERAPY 1/> REGIONS: CPT

## 2018-10-09 PROCEDURE — 97110 THERAPEUTIC EXERCISES: CPT

## 2018-10-09 NOTE — FLOWSHEET NOTE
[facility] [address] [facility]  Phone: [phone] Fax: [phone]      Physical Therapy Daily Treatment Note  Date:  10/9/2018    Patient Name:  Tosin Garcia    :  1960  MRN: [id number]  Restrictions/Precautions:    Medical/Treatment Diagnosis Information:  · Diagnosis: M75.111 (ICD-10-CM) - Incomplete tear of right rotator cuff  · Treatment Diagnosis: M75.111 (ICD-10-CM) - Incomplete tear of right rotator cuff  Insurance/Certification information:     Physician Information:  Referring Practitioner: Dr Dori Castelan of care signed (Y/N):     Date of Patient follow up with Physician:     G-Code (if applicable):      Date G-Code Applied:    PT G-Codes  Functional Assessment Tool Used: FOTO  Score: 45  Functional Limitation: Carrying, moving and handling objects  Carrying, Moving and Handling Objects Current Status (): At least 40 percent but less than 60 percent impaired, limited or restricted  Carrying, Moving and Handling Objects Goal Status (): At least 1 percent but less than 20 percent impaired, limited or restricted    Progress Note: [x]  Yes  []  No  Next due by: Visit #10      Latex Allergy:  [x]NO      []YES  Preferred Language for Healthcare:   [x]English       []other:    Visit # Insurance Allowable   8 20     Pain level:  0-5/10     SUBJECTIVE:  Patient reports shooting free throws with only fatigue. Pain contiues to decrease overall.   Good progress     OBJECTIVE: See eval  Observation:   Test measurements:      RESTRICTIONS/PRECAUTIONS: RC repair w augment patch 18    Exercises/Interventions:   Therapeutic Ex x 30 min Sets/sec Reps Notes   UBE 5 min retro  low   Pendulum/Ball rolls      Cane AAROM flex/press 1 10     3 way [facility] ER/IR   T- band Row/pinch 1 20 blue   T- band lower pinch 1 20 Blue, cues for scap retraction   T- band ER activation 1 15 blue   Supine rhythmic stabilization 10s 3 90 deg Supine SA punch 5s 15    SL ER/SL punch 2 10 2 lb ER/0 lb   Prone Rows/ext 1 20 3 lb   Prone HAB/Prone Flex 1 12 0 lb   Seat Table slides/Wall Slides 1 20    Seated HH Depression 5s 15    No Money      Scap Wall Lat touches/wall walks      Hor add st 20 sec 4    Standing flex/scap      Standing Punch      Lawnmower 1 20 3 lb   Ball rolls 90 deg  1 20 Small green ball   bodyblade 15 sec 5 Small yellow               Manual Intervention      Shld /GH Mobs 0 min  Inf glide grade II   Post Cap mobs      Thoracic/Rib manipualtion      CT MT/Mobs      PROM MT 15 min     Elbow mobs            NMR re-education      T-spine Ext      GH depress/compress      Scap/GH NMR      Body blade      Wall ball roll      Wall Ball bounce      Ball drops      Mari Scap Bio      Floor Snow angels-sliders                      Therapeutic Exercise and NMR EXR  [x] (54705) Provided verbal/tactile cueing for activities related to strengthening, flexibility, endurance, ROM  for improvements in scapular, scapulothoracic and UE control with self care, reaching, carrying, lifting, house/yardwork, driving/computer work. [x] (27996) Provided verbal/tactile cueing for activities related to improving balance, coordination, kinesthetic sense, posture, motor skill, proprioception  to assist with  scapular, scapulothoracic and UE control with self care, reaching, carrying, lifting, house/yardwork, driving/computer work. Therapeutic Activities:    [] (16706 or 52735) Provided verbal/tactile cueing for activities related to improving balance, coordination, kinesthetic sense, posture, motor skill, proprioception and motor activation to allow for proper function of scapular, scapulothoracic and UE control with self care, carrying, lifting, driving/computer work.      Home Exercise Program:    [x] (73438) Reviewed/Progressed HEP activities related to strengthening, flexibility, endurance, ROM of scapular, scapulothoracic and UE control with self care,

## 2018-10-11 ENCOUNTER — HOSPITAL ENCOUNTER (OUTPATIENT)
Dept: PHYSICAL THERAPY | Age: 58
Setting detail: THERAPIES SERIES
Discharge: HOME | End: 2018-10-11
Admitting: ORTHOPAEDIC SURGERY
Payer: COMMERCIAL

## 2018-10-11 PROCEDURE — 97110 THERAPEUTIC EXERCISES: CPT

## 2018-10-11 PROCEDURE — 97140 MANUAL THERAPY 1/> REGIONS: CPT

## 2018-10-11 NOTE — FLOWSHEET NOTE
[facility] [address] [facility], [place] [address]  Phone: [phone] Fax: [phone]      Physical Therapy Daily Treatment Note  Date:  10/11/2018    Patient Name:  Angelic Cruz    :  1960  MRN: [id number]  Restrictions/Precautions:    Medical/Treatment Diagnosis Information:  · Diagnosis: M75.111 (ICD-10-CM) - Incomplete tear of right rotator cuff  · Treatment Diagnosis: M75.111 (ICD-10-CM) - Incomplete tear of right rotator cuff  Insurance/Certification information:     Physician Information:  Referring Practitioner: Dr Shirley Whitaker of care signed (Y/N):     Date of Patient follow up with Physician:     G-Code (if applicable):      Date G-Code Applied:    PT G-Codes  Functional Assessment Tool Used: FOTO  Score: 45  Functional Limitation: Carrying, moving and handling objects  Carrying, Moving and Handling Objects Current Status (): At least 40 percent but less than 60 percent impaired, limited or restricted  Carrying, Moving and Handling Objects Goal Status (): At least 1 percent but less than 20 percent impaired, limited or restricted    Progress Note: [x]  Yes  []  No  Next due by: Visit #10      Latex Allergy:  [x]NO      []YES  Preferred Language for Healthcare:   [x]English       []other:    Visit # Insurance Allowable   9 20     Pain level:  0-5/10     SUBJECTIVE:  Patient reports shooting free throws with only fatigue.  Did a good deal of activity with netting and no issues    OBJECTIVE: See eval  Observation:   Test measurements:      RESTRICTIONS/PRECAUTIONS: RC repair w augment patch 18    Exercises/Interventions:   Therapeutic Ex x 30 min Sets/sec Reps Notes   UBE 5 min retro  low   Pendulum/Ball rolls      Cane AAROM flex/press 1 10     3 way Isomet ER/IR   T- band Row/pinch 1 20 blue   T- band lower pinch 1 20 Blue, cues for scap retraction   T- band ER activation 1 15 blue   Supine rhythmic stabilization 10s 3 90 deg   Supine SA punch 5s 15    SL ER/SL punch 2 10 2 lb ER/0 lb   Prone Rows/ext 1 20 3 lb   Prone HAB/Prone Flex 1 12 0 lb   Seat Table slides/Wall Slides 1 20    Seated HH Depression 5s 15    No Money      Scap Wall Lat touches/wall walks      Hor add st 20 sec 4    Standing flex/scap      Standing Punch      Lawnmower 1 20 3 lb   Ball rolls 90 deg  1 20 Small green ball   bodyblade 15 sec 5 Small yellow   Cable ER/IR 2 12 2/3 pl         Manual Intervention      Shld /GH Mobs 0 min  Inf glide grade II/scap mob   Post Cap mobs      Thoracic/Rib manipualtion      CT MT/Mobs      PROM MT 15 min     Elbow mobs            NMR re-education      T-spine Ext      GH depress/compress      Scap/GH NMR      Body blade      Wall ball roll      Wall Ball bounce      Ball drops      Mari Scap Bio      Floor Snow angels-sliders                      Therapeutic Exercise and NMR EXR  [x] (72935) Provided verbal/tactile cueing for activities related to strengthening, flexibility, endurance, ROM  for improvements in scapular, scapulothoracic and UE control with self care, reaching, carrying, lifting, house/yardwork, driving/computer work. [x] (28253) Provided verbal/tactile cueing for activities related to improving balance, coordination, kinesthetic sense, posture, motor skill, proprioception  to assist with  scapular, scapulothoracic and UE control with self care, reaching, carrying, lifting, house/yardwork, driving/computer work. Therapeutic Activities:    [] (40171 or 78828) Provided verbal/tactile cueing for activities related to improving balance, coordination, kinesthetic sense, posture, motor skill, proprioception and motor activation to allow for proper function of scapular, scapulothoracic and UE control with self care, carrying, lifting, driving/computer work.      Home Exercise Program:    [x] (60450) Reviewed/Progressed HEP activities related to strengthening, flexibility, endurance, ROM of scapular, scapulothoracic and UE control with self care, reaching, carrying, lifting, house/yardwork, driving/computer work  [] (17073) Reviewed/Progressed HEP activities related to improving balance, coordination, kinesthetic sense, posture, motor skill, proprioception of scapular, scapulothoracic and UE control with self care, reaching, carrying, lifting, house/yardwork, driving/computer work      Manual Treatments:  PROM / STM / Oscillations-Mobs:  G-I, II, III, IV (PA's, Inf., Post.)  [] ([id number]) Provided manual therapy to mobilize soft tissue/joints of cervical/CT, scapular GHJ and UE for the purpose of modulating pain, promoting relaxation,  increasing ROM, reducing/eliminating soft tissue swelling/inflammation/restriction, improving soft tissue extensibility and allowing for proper ROM for normal function with self care, reaching, carrying, lifting, house/yardwork, driving/computer work    Modalities:  Ice bag x 10 min. Charges:  Timed Code Treatment Minutes: 55   Total Treatment Minutes: 55     [] EVAL (LOW) 74153 (typically 20 minutes face-to-face)  [] EVAL (MOD) 97141 (typically 30 minutes face-to-face)  [] EVAL (HIGH) 07901 (typically 45 minutes face-to-face)  [] RE-EVAL     [x] XR(70626) x  2   [] IONTO  [] NMR (25566) x      [] VASO  [x] Manual (77604) x  1    [] Other:  [] TA x       [] Mech Traction (84459)  [] ES(attended) (63594)      [] ES (un) (19282):     GOALS:    Patient stated goal: return to sport     Therapist goals for Patient:   Short Term Goals: To be achieved in: 2 weeks  1. Independent in HEP and progression per patient tolerance, in order to prevent re-injury. 2. Patient will have a decrease in pain to facilitate improvement in movement, function, and ADLs as indicated by Functional Deficits.     Long Term Goals: To be achieved in: 8 weeks  1. Disability index score of 75% or more on FOTOto assist with reaching prior level of function.    2. Patient will demonstrate increased AROM to [facility] to allow for proper joint functioning as indicated by patients Functional Deficits. 3. Patient will demonstrate an increase in Strength to good scapular and core control, w/in 5lbs HHD for UE to allow for proper functional mobility as indicated by patients Functional Deficits. 4. Patient will return to basketball functional activities without increased symptoms or restriction. 5. Sleep without pain(patient specific functional goal)    Progression Towards Functional goals:  [] Patient is progressing as expected towards functional goals listed. [] Progression is slowed due to complexities listed. [] Progression has been slowed due to co-morbidities. [x] Plan just implemented, too soon to assess goals progression  [] Other:     ASSESSMENT:  ROM progressing well.  Mobility and strength continue to normalize     Return to Play: (if applicable)   []  Stage 1: Intro to Strength   []  Stage 2: Dynamic Strength and Intro to Plyometrics   []  Stage 3: Advanced Plyometrics and Intro to Throwing   []  Stage 4: Sport specific Training/Return to Sport     []  Ready to Return to Play, [employer]   []  Not Ready for Return to Sports   Comments:      Treatment/Activity Tolerance:  [x] Patient tolerated treatment well [] Patient limited by fatique  [] Patient limited by pain  [] Patient limited by other medical complications  [] Other:     Prognosis: [x] Good [] Fair  [] Poor    Patient Requires Follow-up: [x] Yes  [] No    PLAN: See eval  [x] Continue per plan of care [] Alter current plan (see comments)  [] Plan of care initiated [] Hold pending MD visit [] Discharge    Electronically signed by: Amy Santizo PT

## 2018-10-16 ENCOUNTER — HOSPITAL ENCOUNTER (OUTPATIENT)
Dept: PHYSICAL THERAPY | Age: 58
Setting detail: THERAPIES SERIES
Discharge: HOME | End: 2018-10-16
Admitting: ORTHOPAEDIC SURGERY
Payer: COMMERCIAL

## 2018-10-16 PROCEDURE — 97110 THERAPEUTIC EXERCISES: CPT

## 2018-10-16 NOTE — FLOWSHEET NOTE
in movement, function, and ADLs as indicated by Functional Deficits.     Long Term Goals: To be achieved in: 8 weeks  1. Disability index score of 75% or more on FOTOto assist with reaching prior level of function. 2. Patient will demonstrate [facility] to allow for proper joint functioning as indicated by patients Functional Deficits. 3. Patient will demonstrate an increase in Strength to good scapular and core control, w/in 5lbs HHD for UE to allow for proper functional mobility as indicated by patients Functional Deficits. 4. Patient will return to basketball functional activities without increased symptoms or restriction. 5. Sleep without pain(patient specific functional goal)    Progression Towards Functional goals:  [] Patient is progressing as expected towards functional goals listed. [] Progression is slowed due to complexities listed. [] Progression has been slowed due to co-morbidities. [x] Plan just implemented, too soon to assess goals progression  [] Other:     ASSESSMENT:  ROM progressing well. Mobility and strength continue to normalize. Pt doing well per protocol. Return to Play: (if applicable)   []  Stage 1: Intro to Strength   []  Stage 2: Dynamic Strength and Intro to Plyometrics   []  Stage 3: Advanced Plyometrics and Intro to Throwing   []  Stage 4: Sport specific Training/Return to Sport     []  Ready to Return to Play, [employer]   []  Not Ready for Return to Sports   Comments:      Treatment/Activity Tolerance:  [x] Patient tolerated treatment well [] Patient limited by fatique  [] Patient limited by pain  [] Patient limited by other medical complications  [] Other:     Prognosis: [x] Good [] Fair  [] Poor    Patient Requires Follow-up: [x] Yes  [] No    PLAN: Pt only here once this week per travel for work. Pt to resume 2x/week next week.    [x] Continue per plan of care [] Alter current plan (see comments)  [] Plan of care initiated [] Hold pending

## 2018-10-23 ENCOUNTER — HOSPITAL ENCOUNTER (OUTPATIENT)
Dept: PHYSICAL THERAPY | Age: 58
Setting detail: THERAPIES SERIES
Discharge: HOME | End: 2018-10-23
Admitting: ORTHOPAEDIC SURGERY
Payer: COMMERCIAL

## 2018-10-23 PROCEDURE — 97110 THERAPEUTIC EXERCISES: CPT

## 2018-10-23 PROCEDURE — 97140 MANUAL THERAPY 1/> REGIONS: CPT

## 2018-10-23 NOTE — FLOWSHEET NOTE
stretch 30s 3 90 deg   Cane AAROM flex 1 15 3lb stick   SC up-chops 2 10 sportcord wrapped around base of [employer] leg curl machine   TRX Rows 1 14 Partial range   Table push-up with single arm plus 2 10    T- band Row/pinch blue   T- band lower pinch Blue, cues for scap retraction   T- band ER activation blue   Supine rhythmic stabilization 90 deg   Supine SA punch    SL ER/SL punch 3 lb ER/2-3 lb   Prone Rows/ext 6-7 lb   Prone HAB/Prone Flex 0 lb   Seat Table slides/Wall Slides    Seated HH Depression 5s 15    No Money 5s 20 Red theraloop   Scap Wall Lat touches/wall walks      Hor add st 20 sec 4    Cable multi-directional pulls 2 10 Chest pass, extension (supinated and pronated ), HAB, flexion for shooting 3.5pl    Seated ER/IR control c ball 2 10 8inch box+pillow on tabletop, green sand ball, focusing on ecc control    Lawnmower 1 20 7 lb   Ball rolls 90 deg flex/scap 1 20 Small green ball   bodyblade 15 sec 5 Small black   Cable ER/IR 2 12 2/3 pl   Cable row/extension (single arm) 2 12 4-5pl   Manual Intervention x  10 min      Shld /GH Mobs 0 min  Inf glide grade II/scap mob   Post Cap mobs 4 min     Thoracic/Rib manipualtion      CT MT/Mobs      PROM MT 6 min     Elbow mobs            NMR re-education      T-spine Ext      GH depress/compress      Scap/GH NMR      Body blade      Wall ball roll      Wall Ball bounce      Ball drops      Mari Scap Bio      Floor Snow angels-sliders                      Therapeutic Exercise and NMR EXR  [x] (22639) Provided verbal/tactile cueing for activities related to strengthening, flexibility, endurance, ROM  for improvements in scapular, scapulothoracic and UE control with self care, reaching, carrying, lifting, house/yardwork, driving/computer work.     [x] (48015) Provided verbal/tactile cueing for activities related to improving balance, coordination, kinesthetic sense, posture, motor skill, proprioception  to assist with  scapular, scapulothoracic and UE control

## 2018-10-24 ENCOUNTER — OFFICE VISIT (OUTPATIENT)
Dept: ORTHOPEDIC SURGERY | Age: 58
End: 2018-10-24

## 2018-10-24 VITALS
HEIGHT: 77 IN | BODY MASS INDEX: 28.11 KG/M2 | SYSTOLIC BLOOD PRESSURE: 128 MMHG | WEIGHT: 238.1 LBS | DIASTOLIC BLOOD PRESSURE: 77 MMHG

## 2018-10-24 DIAGNOSIS — M75.111 INCOMPLETE TEAR OF RIGHT ROTATOR CUFF: Primary | ICD-10-CM

## 2018-10-24 PROCEDURE — 99024 POSTOP FOLLOW-UP VISIT: CPT | Performed by: ORTHOPAEDIC SURGERY

## 2018-10-25 ENCOUNTER — HOSPITAL ENCOUNTER (OUTPATIENT)
Dept: PHYSICAL THERAPY | Age: 58
Setting detail: THERAPIES SERIES
Discharge: HOME | End: 2018-10-25
Admitting: ORTHOPAEDIC SURGERY
Payer: COMMERCIAL

## 2018-10-25 PROCEDURE — 97110 THERAPEUTIC EXERCISES: CPT

## 2018-10-25 PROCEDURE — 97140 MANUAL THERAPY 1/> REGIONS: CPT

## 2018-10-25 NOTE — FLOWSHEET NOTE
band lower pinch Blue, cues for scap retraction   T- band ER activation blue   Supine rhythmic stabilization 90 deg   Supine SA punch    SL ER/SL punch 3 lb ER/2-3 lb   Prone Rows/ext 6-7 lb   Prone HAB/Prone Flex 0 lb   Seat Table slides/Wall Slides    Seated HH Depression 5s 15    No Money 5s 20 Red theraloop   Scap Wall Lat touches/wall walks      Hor add st 20 sec 4    Cable multi-directional pulls 2 10 Chest pass, extension (supinated and pronated ), HAB, flexion for shooting 3.5pl    Seated ER/IR control c ball 2 10 8inch box+pillow on tabletop, green sand ball, focusing on ecc control    Lawnmower 1 20 7 lb   Ball rolls 90 deg flex/scap 1 20 Small green ball   bodyblade 15 sec 5 Small black   Cable ER/IR 2 12 2/3 pl   Cable row/extension (single arm) 2 12 4-5pl   Manual Intervention x  10 min      Shld /GH Mobs 0 min  Inf glide grade II/scap mob   Post Cap mobs 4 min     Thoracic/Rib manipualtion      CT MT/Mobs      PROM MT 6 min     Elbow mobs            NMR re-education      T-spine Ext      GH depress/compress      Scap/GH NMR      Body blade      Wall ball roll      Wall Ball bounce      Ball drops      Mari Scap Bio      Floor Snow angels-sliders                      Therapeutic Exercise and NMR EXR  [x] (54196) Provided verbal/tactile cueing for activities related to strengthening, flexibility, endurance, ROM  for improvements in scapular, scapulothoracic and UE control with self care, reaching, carrying, lifting, house/yardwork, driving/computer work. [x] (12993) Provided verbal/tactile cueing for activities related to improving balance, coordination, kinesthetic sense, posture, motor skill, proprioception  to assist with  scapular, scapulothoracic and UE control with self care, reaching, carrying, lifting, house/yardwork, driving/computer work.     Therapeutic Activities:    [] (49910 or 49647) Provided verbal/tactile cueing for activities related to improving balance, coordination, kinesthetic sense, posture, motor skill, proprioception and motor activation to allow for proper function of scapular, scapulothoracic and UE control with self care, carrying, lifting, driving/computer work. Home Exercise Program:    [x] (95846) Reviewed/Progressed HEP activities related to strengthening, flexibility, endurance, ROM of scapular, scapulothoracic and UE control with self care, reaching, carrying, lifting, house/yardwork, driving/computer work  [] (51042) Reviewed/Progressed HEP activities related to improving balance, coordination, kinesthetic sense, posture, motor skill, proprioception of scapular, scapulothoracic and UE control with self care, reaching, carrying, lifting, house/yardwork, driving/computer work      Manual Treatments:  PROM / STM / Oscillations-Mobs:  G-I, II, III, IV (PA's, Inf., Post.)  [] (22750) Provided manual therapy to mobilize soft tissue/joints of cervical/CT, scapular GHJ and UE for the purpose of modulating pain, promoting relaxation,  increasing ROM, reducing/eliminating soft tissue swelling/inflammation/restriction, improving soft tissue extensibility and allowing for proper ROM for normal function with self care, reaching, carrying, lifting, house/yardwork, driving/computer work    Modalities:  Ice bag x 10 min. Charges:  Timed Code Treatment Minutes: 45   Total Treatment Minutes: 45     [] EVAL (LOW) 33756 (typically 20 minutes face-to-face)  [] EVAL (MOD) 95450 (typically 30 minutes face-to-face)  [] EVAL (HIGH) 46247 (typically 45 minutes face-to-face)  [] RE-EVAL     [x] MB(12396) x  3   [] IONTO  [] NMR (01282) x      [] VASO  [x] Manual (66672) x  1    [] Other:  [] TA x       [] Mech Traction (83261)  [] ES(attended) (43578)      [] ES (un) (08257):     GOALS:    Patient stated goal: return to sport     Therapist goals for Patient:   Short Term Goals: To be achieved in: 2 weeks  1.  Independent in HEP and progression per patient tolerance, in order to prevent re-injury. 2. Patient will have a decrease in pain to facilitate improvement in movement, function, and ADLs as indicated by Functional Deficits.     Long Term Goals: To be achieved in: 8 weeks  1. Disability index score of 75% or more on FOTOto assist with reaching prior level of function. 2. Patient will demonstrate increased [facility] to [facility] to allow for proper joint functioning as indicated by patients Functional Deficits. 3. Patient will demonstrate an increase in Strength to good scapular and core control, w/in 5lbs HHD for UE to allow for proper functional mobility as indicated by patients Functional Deficits. 4. Patient will return to basketball functional activities without increased symptoms or restriction. 5. Sleep without pain(patient specific functional goal)    Progression Towards Functional goals:  [] Patient is progressing as expected towards functional goals listed. [] Progression is slowed due to complexities listed. [] Progression has been slowed due to co-morbidities. [x] Plan just implemented, too soon to assess goals progression  [] Other:     ASSESSMENT:  ROM progressing well. Mobility and strength continue to normalize. Pt pushing the shoulder aggressively and has been cautioned about doing too much for the repair. Return to Play: (if applicable)   []  Stage 1: Intro to Strength   []  Stage 2: Dynamic Strength and Intro to Plyometrics   []  Stage 3: Advanced Plyometrics and Intro to Throwing   []  Stage 4: Sport specific Training/Return to Sport     []  Ready to Return to Play, [employer]   []  Not Ready for Return to Sports   Comments:      Treatment/Activity Tolerance:  [x] Patient tolerated treatment well [] Patient limited by fatique  [] Patient limited by pain  [] Patient limited by other medical complications  [] Other:     Prognosis: [x] Good [] Fair  [] Poor    Patient Requires Follow-up: [x] Yes  [] No    PLAN: Pt to see surgeon tomorrow.    [x] Continue per

## 2018-10-30 ENCOUNTER — HOSPITAL ENCOUNTER (OUTPATIENT)
Dept: PHYSICAL THERAPY | Age: 58
Setting detail: THERAPIES SERIES
Discharge: HOME | End: 2018-10-30
Admitting: ORTHOPAEDIC SURGERY
Payer: COMMERCIAL

## 2018-10-30 PROCEDURE — 97110 THERAPEUTIC EXERCISES: CPT

## 2018-10-30 PROCEDURE — 97140 MANUAL THERAPY 1/> REGIONS: CPT

## 2018-10-30 NOTE — FLOWSHEET NOTE
retraction   T- band ER activation blue   Supine rhythmic stabilization 90 deg   Supine SA punch    SL ER/SL punch 3 lb ER/2-3 lb   Prone Rows/ext 6-7 lb   Prone HAB/Prone Flex 0 lb   Seat Table slides/Wall Slides    Seated HH Depression 5s 15    No Money 5s 20 Red theraloop   Scap Wall Lat touches/wall walks      Hor add st 20 sec 4    Cable multi-directional pulls 2 10 Chest pass, extension (supinated and pronated ), HAB, flexion for shooting 3.5pl    Seated ER/IR control c ball 2 10 8inch box+pillow on tabletop, green sand ball, focusing on ecc control    Lawnmower 1 20 7 lb   Ball rolls 90 deg flex/scap 1 20 Small green ball   bodyblade 15 sec 5 Small black   Cable ER/IR 2 12 2/3 pl   [employer] with 11 lb ball 1 15 Both hands holding ball   Cable row/extension (single arm) 2 12 4-5pl   Manual Intervention x  10 min      Shld /GH Mobs 0 min  Inf glide grade II/scap mob   Post Cap mobs 4 min     Thoracic/Rib manipualtion      CT MT/Mobs      PROM MT 6 min     Elbow mobs            NMR re-education      T-spine Ext      GH depress/compress      Scap/GH NMR      Body blade      Wall ball roll      Wall Ball bounce      Ball drops      Mari Scap Bio      Floor Snow angels-sliders                      Therapeutic Exercise and NMR EXR  [x] (75231) Provided verbal/tactile cueing for activities related to strengthening, flexibility, endurance, ROM  for improvements in scapular, scapulothoracic and UE control with self care, reaching, carrying, lifting, house/yardwork, driving/computer work. [x] (64265) Provided verbal/tactile cueing for activities related to improving balance, coordination, kinesthetic sense, posture, motor skill, proprioception  to assist with  scapular, scapulothoracic and UE control with self care, reaching, carrying, lifting, house/yardwork, driving/computer work.     Therapeutic Activities:    [] (43407 or 20065) Provided verbal/tactile cueing for activities related to improving balance,

## 2018-11-01 ENCOUNTER — HOSPITAL ENCOUNTER (OUTPATIENT)
Dept: PHYSICAL THERAPY | Age: 58
Setting detail: THERAPIES SERIES
Discharge: HOME | End: 2018-11-01
Admitting: ORTHOPAEDIC SURGERY
Payer: COMMERCIAL

## 2018-11-01 PROCEDURE — 97110 THERAPEUTIC EXERCISES: CPT

## 2018-11-01 PROCEDURE — 97112 NEUROMUSCULAR REEDUCATION: CPT

## 2018-11-01 NOTE — FLOWSHEET NOTE
OBJECTIVE: See eval  Observation:   Test measurements:      RESTRICTIONS/PRECAUTIONS: RC repair w augment patch 8/31/18    Exercises/Interventions:   Therapeutic Ex x 40 min Sets/sec Reps Notes   UBE 4 min retro  low   Sleeper stretch 30s 3 90 deg   Cane AAROM flex 3lb stick   SC up-chops sportcord wrapped around base of [employer] leg curl machine   TRX Rows 1 14 Partial range   Table push-up with single arm plus 2 10 opp arm/leg hold at top   T- band Row/pinch blue   T- band lower pinch Blue, cues for scap retraction   Seated ER/IR c elbow on box 2 10 Red sand ball, slow, HHD   Supine rhythmic stabilization 90 deg   Supine SA punch    SL ER/SL punch 2 10 5-7lb   Prone Rows/ext 1 20 10-12lb   Prone HAB/Prone Flex 0 lb   Seat Table slides/Wall Slides    Seated HH Depression    No Money 5s 20 Red theraloop   Supine ball plyo's 2 10 11lb- single arm chest pass, double arm overhead pass   Hor add st 20 sec 4    Cable multi-directional pulls 2 10 Chest pass, extension (supinated and pronated ), HAB, flexion for shooting 3.5pl    Seated ER/IR control c ball 2 10 8inch box+pillow on tabletop, green sand ball, focusing on ecc control    Lawnmower 1 20 7 lb   Ball rolls 90 deg flex/scap 1 20 Small green ball   bodyblade Small black   Cable ER/IR 2 12 2/3 pl   [employer] with 11 lb ball 2 10 1/2 kneeling on BOSU, Both hands holding ball   Cable row/extension (single arm) 2 12 4-5pl   Manual Intervention x  10 min      Shld /GH Mobs 0 min  Inf glide grade II/scap mob   Post Cap mobs 4 min     Thoracic/Rib manipualtion      CT MT/Mobs      PROM MT 6 min     Elbow mobs            NMR re-education x 12 minutes      T-spine Ext      GH depress/compress            Body blade      Wall ball roll 2 20 Blue ball, blue band @ forearm   Wall Ball bounce      Ball drops 2 20 Red plyoball and red sand ball, standing and bent over   Mari Scap Bio      Floor Snow angels-sliders 2 10 Supine- limited on both sides lifting, house/yardwork, driving/computer work    Modalities:  Ice bag x 10 min. Charges:  Timed Code Treatment Minutes: 62   Total Treatment Minutes: 62     [] EVAL (LOW) 76134 (typically 20 minutes face-to-face)  [] EVAL (MOD) 75840 (typically 30 minutes face-to-face)  [] EVAL (HIGH) 23075 (typically 45 minutes face-to-face)  [] RE-EVAL     [x] MF(66720) x  3   [] IONTO  [x] NMR (83530) x  1   [] VASO  [] Manual (86649) x       [] Other:  [] TA x       [] Mech Traction (64409)  [] ES(attended) (53566)      [] ES (un) (93453):     GOALS:    Patient stated goal: return to sport     Therapist goals for Patient:   Short Term Goals: To be achieved in: 2 weeks  1. Independent in HEP and progression per patient tolerance, in order to prevent re-injury. 2. Patient will have a decrease in pain to facilitate improvement in movement, function, and ADLs as indicated by Functional Deficits.     Long Term Goals: To be achieved in: 8 weeks  1. Disability index score of 75% or more on FOTOto assist with reaching prior level of function. 2. Patient will demonstrate increased AROM to [facility] to allow for proper joint functioning as indicated by patients Functional Deficits. 3. Patient will demonstrate an increase in Strength to good scapular and core control, w/in 5lbs HHD for UE to allow for proper functional mobility as indicated by patients Functional Deficits. 4. Patient will return to basketball functional activities without increased symptoms or restriction. 5. Sleep without pain(patient specific functional goal)    Progression Towards Functional goals:  [] Patient is progressing as expected towards functional goals listed. [] Progression is slowed due to complexities listed. [] Progression has been slowed due to co-morbidities. [x] Plan just implemented, too soon to assess goals progression  [] Other:     ASSESSMENT:  ROM progressing well. Mobility and strength continue to normalize.  Pt pushing the shoulder aggressively and has been cautioned about doing too much for the repair.      Return to Play: (if applicable)   []  Stage 1: Intro to Strength   []  Stage 2: Dynamic Strength and Intro to Plyometrics   []  Stage 3: Advanced Plyometrics and Intro to Throwing   []  Stage 4: Sport specific Training/Return to Sport     []  Ready to Return to Play, [employer]   []  Not Ready for Return to Sports   Comments:      Treatment/Activity Tolerance:  [x] Patient tolerated treatment well [] Patient limited by fatique  [] Patient limited by pain  [] Patient limited by other medical complications  [] Other:     Prognosis: [x] Good [] Fair  [] Poor    Patient Requires Follow-up: [x] Yes  [] No    PLAN:   [x] Continue per plan of care [] Alter current plan (see comments)  [] Plan of care initiated [] Hold pending MD visit [] Discharge    Electronically signed by: Debra Raza PTA

## 2018-11-06 ENCOUNTER — HOSPITAL ENCOUNTER (OUTPATIENT)
Dept: PHYSICAL THERAPY | Age: 58
Setting detail: THERAPIES SERIES
Discharge: HOME | End: 2018-11-06
Admitting: ORTHOPAEDIC SURGERY
Payer: COMMERCIAL

## 2018-11-06 PROCEDURE — 97110 THERAPEUTIC EXERCISES: CPT

## 2018-11-06 PROCEDURE — 97140 MANUAL THERAPY 1/> REGIONS: CPT

## 2018-11-06 NOTE — FLOWSHEET NOTE
[facility] [address] [address]  Phone: [phone] Fax: [phone]      Physical Therapy Daily Treatment Note  Date:  2018    Patient Name:  Terra Miller    :  1960  MRN: [id number]  Restrictions/Precautions:    Medical/Treatment Diagnosis Information:  · Diagnosis: M75.111 (ICD-10-CM) - Incomplete tear of right rotator cuff  · Treatment Diagnosis: M75.111 (ICD-10-CM) - Incomplete tear of right rotator cuff  Insurance/Certification information:     Physician Information:  Referring Practitioner: Dr Dianne Whitlock of care signed (Y/N):     Date of Patient follow up with Physician:     G-Code (if applicable):      Date G-Code Applied:    PT G-Codes  Functional Assessment Tool Used: FOTO  Score: 45  Functional Limitation: Carrying, moving and handling objects  Carrying, Moving and Handling Objects Current Status (): At least 40 percent but less than 60 percent impaired, limited or restricted  Carrying, Moving and Handling Objects Goal Status (): At least 1 percent but less than 20 percent impaired, limited or restricted    Progress Note: [x]  Yes  []  No  Next due by: Visit #10      Latex Allergy:  [x]NO      []YES  Preferred Language for Healthcare:   [x]English       []other:    Visit # Insurance Allowable   15 20     Pain level:  0-5/10     SUBJECTIVE:  Patient reports he worked out lower body and some upper body on .   Stiff and sore in shoulder region this am.      OBJECTIVE: See eval  Observation:   Test measurements:      RESTRICTIONS/PRECAUTIONS: RC repair w augment patch 18    Exercises/Interventions:   Therapeutic Ex x 40 min Sets/sec Reps Notes   UBE 4 min retro  low   Sleeper stretch 30s 3 90 deg   Cane AAROM flex 3lb stick   SC up-chops sportcord wrapped around base of [employer] leg curl machine   TRX Rows 1 14 Partial range   Table push-up with single arm plus 2 10 opp arm/leg hold at top   T- to assist with  scapular, scapulothoracic and UE control with self care, reaching, carrying, lifting, house/yardwork, driving/computer work. Therapeutic Activities:    [] (22833 or 11661) Provided verbal/tactile cueing for activities related to improving balance, coordination, kinesthetic sense, posture, motor skill, proprioception and motor activation to allow for proper function of scapular, scapulothoracic and UE control with self care, carrying, lifting, driving/computer work. Home Exercise Program:    [x] (21027) Reviewed/Progressed HEP activities related to strengthening, flexibility, endurance, ROM of scapular, scapulothoracic and UE control with self care, reaching, carrying, lifting, house/yardwork, driving/computer work  [] (23999) Reviewed/Progressed HEP activities related to improving balance, coordination, kinesthetic sense, posture, motor skill, proprioception of scapular, scapulothoracic and UE control with self care, reaching, carrying, lifting, house/yardwork, driving/computer work      Manual Treatments:  PROM / STM / Oscillations-Mobs:  G-I, II, III, IV (PA's, Inf., Post.)  [] (92611) Provided manual therapy to mobilize soft tissue/joints of cervical/CT, scapular GHJ and UE for the purpose of modulating pain, promoting relaxation,  increasing ROM, reducing/eliminating soft tissue swelling/inflammation/restriction, improving soft tissue extensibility and allowing for proper ROM for normal function with self care, reaching, carrying, lifting, house/yardwork, driving/computer work    Modalities:  Ice bag x 10 min.      Charges:  Timed Code Treatment Minutes: 50   Total Treatment Minutes: 62     [] EVAL (LOW) 67345 (typically 20 minutes face-to-face)  [] EVAL (MOD) 87131 (typically 30 minutes face-to-face)  [] EVAL (HIGH) 84409 (typically 45 minutes face-to-face)  [] RE-EVAL     [x] W[place](37052) x  2   [] IONTO  [x] NMR (39959) x  1   [] VASO  [] Manual (35552) x       [] Other:  [] TA x       []

## 2018-11-08 ENCOUNTER — HOSPITAL ENCOUNTER (OUTPATIENT)
Dept: PHYSICAL THERAPY | Age: 58
Setting detail: THERAPIES SERIES
Discharge: HOME | End: 2018-11-08
Admitting: ORTHOPAEDIC SURGERY
Payer: COMMERCIAL

## 2018-11-08 PROCEDURE — 97110 THERAPEUTIC EXERCISES: CPT

## 2018-11-08 PROCEDURE — 97112 NEUROMUSCULAR REEDUCATION: CPT

## 2018-11-08 NOTE — FLOWSHEET NOTE
Intro to Throwing   []  Stage 4: Sport specific Training/Return to Sport     []  Ready to Return to Play, [employer]   []  Not Ready for Return to Sports   Comments:      Treatment/Activity Tolerance:  [x] Patient tolerated treatment well [] Patient limited by fatique  [] Patient limited by pain  [] Patient limited by other medical complications  [] Other:     Prognosis: [x] Good [] Fair  [] Poor    Patient Requires Follow-up: [x] Yes  [] No    PLAN:   [x] Continue per plan of care [] Alter current plan (see comments)  [] Plan of care initiated [] Hold pending MD visit [] Discharge    Electronically signed by: Westley Mayer PTA

## 2018-11-13 ENCOUNTER — HOSPITAL ENCOUNTER (OUTPATIENT)
Dept: PHYSICAL THERAPY | Age: 58
Setting detail: THERAPIES SERIES
Discharge: HOME | End: 2018-11-13
Admitting: ORTHOPAEDIC SURGERY
Payer: COMMERCIAL

## 2018-11-13 PROCEDURE — 97140 MANUAL THERAPY 1/> REGIONS: CPT

## 2018-11-13 PROCEDURE — 97112 NEUROMUSCULAR REEDUCATION: CPT

## 2018-11-13 PROCEDURE — 97110 THERAPEUTIC EXERCISES: CPT

## 2018-11-13 NOTE — FLOWSHEET NOTE
lb  Scaption/Flex L 30 lb, R 22 lb    ROM symmetrical except end range flexion on R is limited.  v      RESTRICTIONS/PRECAUTIONS: RC repair w augment patch 8/31/18    Exercises/Interventions:   Therapeutic Ex x 32 min Sets/sec Reps Notes   UBE 4 min retro  low   Sleeper stretch 30s 3 90 deg   Supine flexion/scaption 2 10 3lb   SC up-chops sportcord wrapped around base of [employer] leg curl machine   TRX Rows 1 14 Partial range   Table push-up with single arm plus 2 10 opp arm/leg hold at top   T- band Row/pinch blue   T- band lower pinch Blue, cues for scap retraction   Seated ER/IR c elbow on box 2 10 Red sand ball, slow, HHD   Supine rhythmic stabilization 90 deg   Supine SA punch    SL ER/SL punch 2 10 5-7lb   Prone Rows/ext 1 20 10-12lb   Prone HAB/Prone Flex 0 lb   Seat Table slides/Wall Slides    Seated HH Depression    No Money 5s 20 Red theraloop+ wall walks   Supine ball plyo's 2 10 6-11lb- single arm chest pass, double arm overhead pass   Hor add st 20 sec 4    Cable multi-directional pulls 2 10 Chest pass, extension (supinated and pronated ), HAB, flexion for shooting 3.5pl    Seated ER/IR control c ball 2 10 8inch box+pillow on tabletop, green sand ball, focusing on ecc control    Lawnmower 1 20 7 lb   Ball rolls 90 deg flex/scap 1 20 Small green ball   bodyblade Small black   Cable ER/IR 2 12 2/3 pl   [employer] with 11 lb ball 1/2 kneeling on BOSU, Both hands holding ball   Cable row/extension (single arm) 2 12 4-5pl   Manual Intervention x  10 min      Shld /GH Mobs 0 min  Inf glide grade IV/scap mob   Post/Inf Cap mobs 8 min     Thoracic/Rib manipualtion      CT MT/Mobs      PROM MT 8 min     Elbow mobs            NMR re-education x 14 minutes      T-spine Ext      GH depress/compress            Body blade      Wall ball roll 2 20 Blue ball, blue band @ forearm   Wall Ball bounce      Ball drops 2 20 Red plyoball and red sand ball, standing and bent over   [facility] well. Mobility and strength continue to normalize. Pt pushing the shoulder aggressively and has been cautioned about doing too much for the repair.      Return to Play: (if applicable)   []  Stage 1: Intro to Strength   []  Stage 2: Dynamic Strength and Intro to Plyometrics   []  Stage 3: Advanced Plyometrics and Intro to Throwing   []  Stage 4: Sport specific Training/Return to Sport     []  Ready to Return to Play, [employer]   []  Not Ready for Return to Sports   Comments:      Treatment/Activity Tolerance:  [x] Patient tolerated treatment well [] Patient limited by fatique  [] Patient limited by pain  [] Patient limited by other medical complications  [] Other:     Prognosis: [x] Good [] Fair  [] Poor    Patient Requires Follow-up: [x] Yes  [] No    PLAN:   [x] Continue per plan of care [] Alter current plan (see comments)  [] Plan of care initiated [] Hold pending MD visit [] Discharge    Electronically signed by: Gretta Evans PTA

## 2018-11-14 ENCOUNTER — OFFICE VISIT (OUTPATIENT)
Dept: ORTHOPEDIC SURGERY | Age: 58
End: 2018-11-14

## 2018-11-14 VITALS — WEIGHT: 238.54 LBS | BODY MASS INDEX: 28.17 KG/M2 | HEIGHT: 77 IN

## 2018-11-14 DIAGNOSIS — M19.011 ARTHRITIS OF RIGHT ACROMIOCLAVICULAR JOINT: ICD-10-CM

## 2018-11-14 DIAGNOSIS — M75.41 SUBACROMIAL IMPINGEMENT, RIGHT: ICD-10-CM

## 2018-11-14 DIAGNOSIS — M75.111 INCOMPLETE TEAR OF RIGHT ROTATOR CUFF: Primary | ICD-10-CM

## 2018-11-14 DIAGNOSIS — M19.011 GLENOHUMERAL ARTHRITIS, RIGHT: ICD-10-CM

## 2018-11-14 PROCEDURE — 99024 POSTOP FOLLOW-UP VISIT: CPT | Performed by: ORTHOPAEDIC SURGERY

## 2018-11-20 ENCOUNTER — HOSPITAL ENCOUNTER (OUTPATIENT)
Dept: PHYSICAL THERAPY | Age: 58
Setting detail: THERAPIES SERIES
Discharge: HOME | End: 2018-11-20
Admitting: ORTHOPAEDIC SURGERY
Payer: COMMERCIAL

## 2018-11-20 PROCEDURE — 97140 MANUAL THERAPY 1/> REGIONS: CPT

## 2018-11-20 PROCEDURE — 97112 NEUROMUSCULAR REEDUCATION: CPT

## 2018-11-20 PROCEDURE — 97110 THERAPEUTIC EXERCISES: CPT

## 2018-11-20 NOTE — FLOWSHEET NOTE
14 Partial range   Table push-up with single arm plus 2 10 opp arm/leg hold at top   Seated ER/IR c elbow on box 2 10 Red sand ball, slow, HHD   Supine ball plyo's 2 10 6-11lb- single arm chest pass, double arm overhead pass   Hor add st 20 sec 4    Cable multi-directional pulls 2 10 Chest pass, extension (supinated and pronated ), HAB, flexion for shooting 3.5pl    Seated ER/IR control c ball 2 10 8inch box+pillow on tabletop, green sand ball, focusing on ecc control    bodyblade Small black   Cable ER/IR 2 12 2/3 pl   [employer] with 11 lb ball 2 10 1/2 kneeling on BOSU, Both hands holding ball   Manual Intervention x  17 min      Shld /GH Mobs 0 min  Inf glide grade IV/scap mob   Post/Inf Cap mobs 12 min     Thoracic/Rib manipualtion      CT MT/Mobs      PROM MT 5 min     Elbow mobs            NMR re-education x 14 minutes      T-spine Ext      GH depress/compress            Body blade      Wall ball roll 2 20 Blue ball, blue band @ forearm   Wall Ball bounce      Ball drops 2 20 Red plyoball and red sand ball, standing and bent over   Mari Scap Bio      Floor Snow angels-sliders 2 10 Supine- ROM normalizing; still some deficit on R. Therapeutic Exercise and NMR EXR  [x] (69537) Provided verbal/tactile cueing for activities related to strengthening, flexibility, endurance, ROM  for improvements in scapular, scapulothoracic and UE control with self care, reaching, carrying, lifting, house/yardwork, driving/computer work. [x] (57578) Provided verbal/tactile cueing for activities related to improving balance, coordination, kinesthetic sense, posture, motor skill, proprioception  to assist with  scapular, scapulothoracic and UE control with self care, reaching, carrying, lifting, house/yardwork, driving/computer work.     Therapeutic Activities:    [] (40783 or 66559) Provided verbal/tactile cueing for activities related to improving balance, coordination, kinesthetic sense, posture, motor skill, proprioception and motor activation to allow for proper function of scapular, scapulothoracic and UE control with self care, carrying, lifting, driving/computer work. Home Exercise Program:    [x] (57545) Reviewed/Progressed HEP activities related to strengthening, flexibility, endurance, ROM of scapular, scapulothoracic and UE control with self care, reaching, carrying, lifting, house/yardwork, driving/computer work  [] (91604) Reviewed/Progressed HEP activities related to improving balance, coordination, kinesthetic sense, posture, motor skill, proprioception of scapular, scapulothoracic and UE control with self care, reaching, carrying, lifting, house/yardwork, driving/computer work      Manual Treatments:  PROM / STM / Oscillations-Mobs:  G-I, II, III, IV (PA's, Inf., Post.)  [] (02597) Provided manual therapy to mobilize soft tissue/joints of cervical/CT, scapular GHJ and UE for the purpose of modulating pain, promoting relaxation,  increasing ROM, reducing/eliminating soft tissue swelling/inflammation/restriction, improving soft tissue extensibility and allowing for proper ROM for normal function with self care, reaching, carrying, lifting, house/yardwork, driving/computer work    Modalities:  none    Charges:  Timed Code Treatment Minutes: 56   Total Treatment Minutes: 56     [] EVAL (LOW) 12183 (typically 20 minutes face-to-face)  [] EVAL (MOD) 60010 (typically 30 minutes face-to-face)  [] EVAL (HIGH) 97390 (typically 45 minutes face-to-face)  [] RE-EVAL     [x] FJ(65937) x  2   [] IONTO  [x] NMR (71559) x  1   [] VASO  [x] Manual (90099) x  1    [] Other:  [] TA x       [] Mech Traction (02996)  [] ES(attended) (64575)      [] ES (un) (00267):     GOALS:    Patient stated goal: return to sport     Therapist goals for Patient:   Short Term Goals: To be achieved in: 2 weeks  1. Independent in HEP and progression per patient tolerance, in order to prevent re-injury.    2. Patient will have a decrease

## 2018-11-27 ENCOUNTER — HOSPITAL ENCOUNTER (OUTPATIENT)
Dept: PHYSICAL THERAPY | Age: 58
Setting detail: THERAPIES SERIES
Discharge: HOME | End: 2018-11-27
Admitting: ORTHOPAEDIC SURGERY
Payer: COMMERCIAL

## 2018-11-27 PROCEDURE — 97140 MANUAL THERAPY 1/> REGIONS: CPT

## 2018-11-27 PROCEDURE — 97110 THERAPEUTIC EXERCISES: CPT

## 2018-11-27 PROCEDURE — 97112 NEUROMUSCULAR REEDUCATION: CPT

## 2018-11-27 NOTE — FLOWSHEET NOTE
improving balance, coordination, kinesthetic sense, posture, motor skill, proprioception and motor activation to allow for proper function of scapular, scapulothoracic and UE control with self care, carrying, lifting, driving/computer work. Home Exercise Program:    [x] (57197) Reviewed/Progressed HEP activities related to strengthening, flexibility, endurance, ROM of scapular, scapulothoracic and UE control with self care, reaching, carrying, lifting, house/yardwork, driving/computer work  [] (30670) Reviewed/Progressed HEP activities related to improving balance, coordination, kinesthetic sense, posture, motor skill, proprioception of scapular, scapulothoracic and UE control with self care, reaching, carrying, lifting, house/yardwork, driving/computer work      Manual Treatments:  PROM / STM / Oscillations-Mobs:  G-I, II, III, IV (PA's, Inf., Post.)  [] (44703) Provided manual therapy to mobilize soft tissue/joints of cervical/CT, scapular GHJ and UE for the purpose of modulating pain, promoting relaxation,  increasing ROM, reducing/eliminating soft tissue swelling/inflammation/restriction, improving soft tissue extensibility and allowing for proper ROM for normal function with self care, reaching, carrying, lifting, house/yardwork, driving/computer work    Modalities:  [employer] x 10 min @ lower pressure. (prefers ice in the future)    Charges:  Timed Code Treatment Minutes: 62   Total Treatment Minutes: 62     [] EVAL (LOW) 83638 (typically 20 minutes face-to-face)  [] EVAL (MOD) 75399 (typically 30 minutes face-to-face)  [] EVAL (HIGH) 83769 (typically 45 minutes face-to-face)  [] RE-EVAL     [x] ZX(09210) x  2   [] IONTO  [x] NMR (93327) x  1   [] VASO  [x] Manual (97289) x  1    [] Other:  [] TA x       [] Mech Traction (54275)  [] ES(attended) (77368)      [] ES (un) (32748):     GOALS:    Patient stated goal: return to sport     Therapist goals for Patient:   Short Term Goals:  To be achieved in: 2 weeks  1. Independent in HEP and progression per patient tolerance, in order to prevent re-injury. 2. Patient will have a decrease in pain to facilitate improvement in movement, function, and ADLs as indicated by Functional Deficits.     Long Term Goals: To be achieved in: 8 weeks  1. Disability index score of 75% or more on FOTOto assist with reaching prior level of function. 2. Patient will demonstrate increased AROM to [facility] to allow for proper joint functioning as indicated by patients Functional Deficits. 95% MET  3. Patient will demonstrate an increase in Strength to good scapular and core control, w/in 5lbs HHD for UE to allow for proper functional mobility as indicated by patients Functional Deficits. 75% MET  4. Patient will return to basketball functional activities without increased symptoms or restriction. 80% MET  5. Sleep without pain(patient specific functional goal) 80% MET    Progression Towards Functional goals:  [] Patient is progressing as expected towards functional goals listed. [] Progression is slowed due to complexities listed. [] Progression has been slowed due to co-morbidities. [x] Plan just implemented, too soon to assess goals progression  [] Other:     ASSESSMENT:  ROM improving, but still tight at end-range with flexion, ER, IR, abduction. Strength of bigger muscle groups doing well; stabilizers need work to normalize ROM and mechanics.        Return to Play: (if applicable)   []  Stage 1: Intro to Strength   []  Stage 2: Dynamic Strength and Intro to Plyometrics   []  Stage 3: Advanced Plyometrics and Intro to Throwing   []  Stage 4: Sport specific Training/Return to Sport     []  Ready to Return to Play, [employer]   []  Not Ready for Return to Sports   Comments:      Treatment/Activity Tolerance:  [x] Patient tolerated treatment well [] Patient limited by fatique  [] Patient limited by pain  [] Patient limited by other medical complications  [] Other:

## 2018-11-29 ENCOUNTER — HOSPITAL ENCOUNTER (OUTPATIENT)
Dept: PHYSICAL THERAPY | Age: 58
Setting detail: THERAPIES SERIES
Discharge: HOME | End: 2018-11-29
Admitting: ORTHOPAEDIC SURGERY
Payer: COMMERCIAL

## 2018-11-29 PROCEDURE — 97140 MANUAL THERAPY 1/> REGIONS: CPT

## 2018-11-29 PROCEDURE — 97112 NEUROMUSCULAR REEDUCATION: CPT

## 2018-11-29 PROCEDURE — 97110 THERAPEUTIC EXERCISES: CPT

## 2018-11-29 NOTE — FLOWSHEET NOTE
[facility] [address] [facility] [address]  Phone: [phone] Fax: [phone]      Physical Therapy Daily Treatment Note  Date:  2018    Patient Name:  Zina Trivedi    :  1960  MRN: [id number]  Restrictions/Precautions:    Medical/Treatment Diagnosis Information:  · Diagnosis: M75.111 (ICD-10-CM) - Incomplete tear of right rotator cuff  · Treatment Diagnosis: M75.111 (ICD-10-CM) - Incomplete tear of right rotator cuff  Insurance/Certification information:     Physician Information:  Referring Practitioner: Dr Patito Hudson of care signed (Y/N):     Date of Patient follow up with Physician:     G-Code (if applicable):      Date G-Code Applied:    PT G-Codes  Functional Assessment Tool Used: FOTO  Score: 45  Functional Limitation: Carrying, moving and handling objects  Carrying, Moving and Handling Objects Current Status (): At least 40 percent but less than 60 percent impaired, limited or restricted  Carrying, Moving and Handling Objects Goal Status (): At least 1 percent but less than 20 percent impaired, limited or restricted    Progress Note: [x]  Yes  []  No  Next due by: Visit #10      Latex Allergy:  [x]NO      []YES  Preferred Language for Healthcare:   [x]English       []other:    Visit # Insurance Allowable   19 20     Pain level:  0-5/10     SUBJECTIVE:  Patient states that he played two hours of basketball yesterday. His shoulder is not as sore as the rest of his body. Pt is frustrated with the amount of \"catching up\" he needs to do with his training- strengthening and cardio, to get to the level he should be to compete effectively and avoid future injury. Pt notes overall feeling of low energy today secondary to self-diagnosed sinus infection. OBJECTIVE: Pt demonstrated sleeper stretch for HEP.        Observation:   Test measurements:  MMT  ER L 40 lb, R 32 lb  IR L 50 lb, R 41 lb  Scaption/Flex L 30 lb, R x  1    [] Other:  [] TA x       [] [facility] Traction (93434)  [] ES(attended) (20853)      [] ES (un) (02824):     GOALS:    Patient stated goal: return to sport     Therapist goals for Patient:   Short Term Goals: To be achieved in: 2 weeks  1. Independent in HEP and progression per patient tolerance, in order to prevent re-injury. 2. Patient will have a decrease in pain to facilitate improvement in movement, function, and ADLs as indicated by Functional Deficits.     Long Term Goals: To be achieved in: 8 weeks  1. Disability index score of 75% or more on FOTOto assist with reaching prior level of function. 2. Patient will demonstrate increased AROM to [facility] to allow for proper joint functioning as indicated by patients Functional Deficits. 95% MET  3. Patient will demonstrate an increase in Strength to good scapular and core control, w/in 5lbs HHD for UE to allow for proper functional mobility as indicated by patients Functional Deficits. 75% MET  4. Patient will return to basketball functional activities without increased symptoms or restriction. 80% MET  5. Sleep without pain(patient specific functional goal) 80% MET    Progression Towards Functional goals:  [] Patient is progressing as expected towards functional goals listed. [] Progression is slowed due to complexities listed. [] Progression has been slowed due to co-morbidities. [x] Plan just implemented, too soon to assess goals progression  [] Other:     ASSESSMENT:  ROM improving, but still tight at end-range with flexion, ER, IR, abduction. Strength of bigger muscle groups doing well; stabilizers need work to normalize ROM and mechanics.        Return to Play: (if applicable)   []  Stage 1: Intro to Strength   []  Stage 2: Dynamic Strength and Intro to Plyometrics   []  Stage 3: Advanced Plyometrics and Intro to Throwing   []  Stage 4: Sport specific Training/Return to Sport     []  Ready to Return to Play, Meets All Above Stages   []  Not Ready for

## 2018-12-04 ENCOUNTER — HOSPITAL ENCOUNTER (OUTPATIENT)
Dept: PHYSICAL THERAPY | Age: 58
Setting detail: THERAPIES SERIES
Discharge: HOME | End: 2018-12-04
Admitting: ORTHOPAEDIC SURGERY
Payer: COMMERCIAL

## 2018-12-04 PROCEDURE — 97110 THERAPEUTIC EXERCISES: CPT

## 2018-12-04 PROCEDURE — 97112 NEUROMUSCULAR REEDUCATION: CPT

## 2018-12-04 PROCEDURE — 97140 MANUAL THERAPY 1/> REGIONS: CPT

## 2018-12-04 NOTE — FLOWSHEET NOTE
kinesthetic sense, posture, motor skill, proprioception  to assist with  scapular, scapulothoracic and UE control with self care, reaching, carrying, lifting, house/yardwork, driving/computer work. Therapeutic Activities:    [] (15949 or 92174) Provided verbal/tactile cueing for activities related to improving balance, coordination, kinesthetic sense, posture, motor skill, proprioception and motor activation to allow for proper function of scapular, scapulothoracic and UE control with self care, carrying, lifting, driving/computer work.      Home Exercise Program:    [x] (55560) Reviewed/Progressed HEP activities related to strengthening, flexibility, endurance, ROM of scapular, scapulothoracic and UE control with self care, reaching, carrying, lifting, house/yardwork, driving/computer work  [] (97670) Reviewed/Progressed HEP activities related to improving balance, coordination, kinesthetic sense, posture, motor skill, proprioception of scapular, scapulothoracic and UE control with self care, reaching, carrying, lifting, house/yardwork, driving/computer work      Manual Treatments:  PROM / STM / Oscillations-Mobs:  G-I, II, III, IV (PA's, Inf., Post.)  [] (80637) Provided manual therapy to mobilize soft tissue/joints of cervical/CT, scapular GHJ and UE for the purpose of modulating pain, promoting relaxation,  increasing ROM, reducing/eliminating soft tissue swelling/inflammation/restriction, improving soft tissue extensibility and allowing for proper ROM for normal function with self care, reaching, carrying, lifting, house/yardwork, driving/computer work    Modalities:    Charges:  Timed Code Treatment Minutes: 44   Total Treatment Minutes: 44     [] EVAL (LOW) 66923 (typically 20 minutes face-to-face)  [] EVAL (MOD) 94553 (typically 30 minutes face-to-face)  [] EVAL (HIGH) 47156 (typically 45 minutes face-to-face)  [] RE-EVAL     [x] VD(59450) x  1   [] IONTO  [x] NMR (77615) x  1   [] VASO  [x] Manual ([phone])

## 2018-12-06 ENCOUNTER — HOSPITAL ENCOUNTER (OUTPATIENT)
Dept: PHYSICAL THERAPY | Age: 58
Setting detail: THERAPIES SERIES
Discharge: HOME | End: 2018-12-06
Admitting: ORTHOPAEDIC SURGERY
Payer: COMMERCIAL

## 2018-12-06 PROCEDURE — 97112 NEUROMUSCULAR REEDUCATION: CPT

## 2018-12-06 PROCEDURE — 97140 MANUAL THERAPY 1/> REGIONS: CPT

## 2018-12-06 PROCEDURE — 97110 THERAPEUTIC EXERCISES: CPT

## 2018-12-06 NOTE — FLOWSHEET NOTE
8/31/18    Exercises/Interventions:   Therapeutic Ex x 12 min Sets/sec Reps Notes   UBE 4 min retro  low   SC up-chops sportcord wrapped around base of cybex leg curl machine   Supine weighted flexion 1 10 Red ball   TRX Rows 1 14 Partial range   TRX partial push-up 2 10    Seated ER/IR c elbow on box 2 10 Red sand ball, slow, HHD   Supine ball plyo's 6-11lb- single arm chest pass, double arm overhead pass   BOSU up/up/down/down 2 10 planks   BOSU plank holds + leg extensions 2 10 challenging   Cable multi-directional pulls 2 10 Chest pass, extension (supinated and pronated ), HAB, flexion for shooting 3.5pl    Seated ER/IR control c ball 2 10 8inch box+pillow on tabletop, green sand ball, focusing on ecc control    bodyblade Small black   Cable ER/IR 2 12 2/3 pl   [employer] with 11 lb ball 1/2 kneeling on BOSU, Both hands holding ball   Manual Intervention x  18 min      Shld /GH Mobs 0 min  Inf glide grade IV/scap mob   Post/Inf Cap mobs 8 min     Thoracic/Rib manipualtion      IASTM 5 min  Posterior deltoid   PROM MT 5 min     Elbow mobs            NMR re-education x 14 minutes      T-spine Ext      GH depress/compress            Body blade      Wall ball roll 2 20 Blue ball, blue band @ forearm   Wall Ball bounce      Ball drops 2 20  red sand ball, standing and bent over   Mari Scap Bio      Floor Snow angels-sliders 2 10 Supine- ROM normalizing; still some deficit on R. Therapeutic Exercise and NMR EXR  [x] (16242) Provided verbal/tactile cueing for activities related to strengthening, flexibility, endurance, ROM  for improvements in scapular, scapulothoracic and UE control with self care, reaching, carrying, lifting, house/yardwork, driving/computer work.     [x] (00336) Provided verbal/tactile cueing for activities related to improving balance, coordination, kinesthetic sense, posture, motor skill, proprioception  to assist with  scapular, scapulothoracic and UE control with self care, reaching, carrying, lifting, house/yardwork, driving/computer work. Therapeutic Activities:    [] (86639 or 51632) Provided verbal/tactile cueing for activities related to improving balance, coordination, kinesthetic sense, posture, motor skill, proprioception and motor activation to allow for proper function of scapular, scapulothoracic and UE control with self care, carrying, lifting, driving/computer work.      Home Exercise Program:    [x] (53133) Reviewed/Progressed HEP activities related to strengthening, flexibility, endurance, ROM of scapular, scapulothoracic and UE control with self care, reaching, carrying, lifting, house/yardwork, driving/computer work  [] (60499) Reviewed/Progressed HEP activities related to improving balance, coordination, kinesthetic sense, posture, motor skill, proprioception of scapular, scapulothoracic and UE control with self care, reaching, carrying, lifting, house/yardwork, driving/computer work      Manual Treatments:  PROM / STM / Oscillations-Mobs:  G-I, II, III, IV (PA's, Inf., Post.)  [] (44506) Provided manual therapy to mobilize soft tissue/joints of cervical/CT, scapular GHJ and UE for the purpose of modulating pain, promoting relaxation,  increasing ROM, reducing/eliminating soft tissue swelling/inflammation/restriction, improving soft tissue extensibility and allowing for proper ROM for normal function with self care, reaching, carrying, lifting, house/yardwork, driving/computer work    Modalities:    Charges:  Timed Code Treatment Minutes: 44   Total Treatment Minutes: 44     [] EVAL (LOW) 06225 (typically 20 minutes face-to-face)  [] EVAL (MOD) 13753 (typically 30 minutes face-to-face)  [] EVAL (HIGH) 91479 (typically 45 minutes face-to-face)  [] RE-EVAL     [x] MF(70559) x  1   [] IONTO  [x] NMR (07534) x  1   [] VASO  [x] Manual (72717) x  1    [] Other:  [] TA x       [] Mech Traction (83663)  [] ES(attended) (25387)      [] ES (un) (48453):     GOALS:    Patient leroy  [] Patient limited by pain  [] Patient limited by other medical complications  [] Other:     Prognosis: [x] Good [] Fair  [] Poor    Patient Requires Follow-up: [x] Yes  [] No    PLAN: Pt to take tomorrow off secondary to muscle soreness.    [x] Continue per plan of care [] Alter current plan (see comments)  [] Plan of care initiated [] Hold pending MD visit [] Discharge    Electronically signed by: Jorge Yang PTA

## 2018-12-11 ENCOUNTER — HOSPITAL ENCOUNTER (OUTPATIENT)
Dept: PHYSICAL THERAPY | Age: 58
Setting detail: THERAPIES SERIES
Discharge: HOME | End: 2018-12-11
Admitting: ORTHOPAEDIC SURGERY
Payer: COMMERCIAL

## 2018-12-11 PROCEDURE — 97140 MANUAL THERAPY 1/> REGIONS: CPT

## 2018-12-11 PROCEDURE — 97110 THERAPEUTIC EXERCISES: CPT

## 2018-12-11 PROCEDURE — 97112 NEUROMUSCULAR REEDUCATION: CPT

## 2018-12-11 NOTE — FLOWSHEET NOTE
limited by other medical complications  [] Other:     Prognosis: [x] Good [] Fair  [] Poor    Patient Requires Follow-up: [x] Yes  [] No    PLAN: Pt to take tomorrow off secondary to muscle soreness.    [x] Continue per plan of care [] Alter current plan (see comments)  [] Plan of care initiated [] Hold pending MD visit [] Discharge    Electronically signed by: Kyung Altamirano PTA

## 2018-12-13 ENCOUNTER — HOSPITAL ENCOUNTER (OUTPATIENT)
Dept: PHYSICAL THERAPY | Age: 58
Setting detail: THERAPIES SERIES
Discharge: HOME | End: 2018-12-13
Admitting: ORTHOPAEDIC SURGERY
Payer: COMMERCIAL

## 2018-12-13 PROCEDURE — 97112 NEUROMUSCULAR REEDUCATION: CPT

## 2018-12-13 PROCEDURE — 97110 THERAPEUTIC EXERCISES: CPT

## 2018-12-13 PROCEDURE — 97140 MANUAL THERAPY 1/> REGIONS: CPT

## 2018-12-13 NOTE — FLOWSHEET NOTE
Play, Meets All Above Stages   []  Not Ready for Return to Sports   Comments:      Treatment/Activity Tolerance:  [x] Patient tolerated treatment well [] Patient limited by fatique  [] Patient limited by pain  [] Patient limited by other medical complications  [] Other:     Prognosis: [x] Good [] Fair  [] Poor    Patient Requires Follow-up: [x] Yes  [] No    PLAN:  Continue 3 more weeks.    [x] Continue per plan of care [] Alter current plan (see comments)  [] Plan of care initiated [] Hold pending MD visit [] Discharge    Electronically signed by: Fernando Chauhan PTA

## 2018-12-18 ENCOUNTER — HOSPITAL ENCOUNTER (OUTPATIENT)
Dept: PHYSICAL THERAPY | Age: 58
Setting detail: THERAPIES SERIES
Discharge: HOME | End: 2018-12-18
Admitting: ORTHOPAEDIC SURGERY
Payer: COMMERCIAL

## 2018-12-18 PROCEDURE — 97140 MANUAL THERAPY 1/> REGIONS: CPT

## 2018-12-18 PROCEDURE — 97112 NEUROMUSCULAR REEDUCATION: CPT

## 2018-12-18 PROCEDURE — 97110 THERAPEUTIC EXERCISES: CPT

## 2018-12-18 NOTE — FLOWSHEET NOTE
[place] [address] [address]  Phone: [phone] Fax: [phone]      Physical Therapy Daily Treatment Note  Date:  2018    Patient Name:  Wolfgang Short    :  1960  MRN: [id number]  Restrictions/Precautions:    Medical/Treatment Diagnosis Information:  · Diagnosis: M75.111 (ICD-10-CM) - Incomplete tear of right rotator cuff  · Treatment Diagnosis: M75.111 (ICD-10-CM) - Incomplete tear of right rotator cuff  Insurance/Certification information:     Physician Information:  Referring Practitioner: Dr Monica Hartman of care signed (Y/N):     Date of Patient follow up with Physician:     G-Code (if applicable):      Date G-Code Applied:    PT G-Codes  Functional Assessment Tool Used: FOTO  Score: 45  Functional Limitation: Carrying, moving and handling objects  Carrying, Moving and Handling Objects Current Status (): At least 40 percent but less than 60 percent impaired, limited or restricted  Carrying, Moving and Handling Objects Goal Status (): At least 1 percent but less than 20 percent impaired, limited or restricted    Progress Note: [x]  Yes  []  No  Next due by: Visit #10      Latex Allergy:  [x]NO      []YES  Preferred Language for Healthcare:   [x]English       []other:    Visit # Insurance Allowable   24 20     Pain level:  0-5/10     SUBJECTIVE:  Patient states that he spent a lot of time lifting weights and [place] and shot basketball for about an hour after that. The shoulder feels stiff today and Pt reports discomfort at the lateral side of the shoulder at mid-range with most directions. Pt decided to take the rest of the day off. OBJECTIVE: Pt demonstrated sleeper stretch for HEP. Observation:   Test measurements:  MMT  ER L 40 lb, R 32 lb  IR L 50 lb, R 41 lb  Scaption/Flex L 30 lb, R 22 lb    ROM symmetrical except end range flexion on R is limited. RESTRICTIONS/PRECAUTIONS: RC repair w augment patch 8/31/18    Exercises/Interventions:   Therapeutic Ex x 20 min Sets/sec Reps Notes   UBE 4 min retro  low   SC up-chops sportcord wrapped around base of [employer] leg curl machine   Supine weighted flexion 1 10 Red ball   TRX Rows 1 14 Partial range   TRX partial push-up 2 10    Seated ER/IR c elbow on box 2 10 Red sand ball, slow, HHD   Supine ball plyo's 6-11lb- single arm chest pass, double arm overhead pass   BOSU up/up/down/down 2 10 planks   BOSU plank holds + leg extensions 2 10 challenging   Cable multi-directional pulls 2 10 Chest pass, extension (supinated and pronated ), HAB, flexion for shooting 3.5pl    Seated ER/IR control c ball 2 10 8inch box+pillow on tabletop, green sand ball, focusing on ecc control    Supine D2 ecc band pulls 2 10 Black tube, pain/popping better after first few reps      bodyblade Small black   Cable ER/IR 2 12 2/3 pl   [employer] with 11 lb ball 1/2 kneeling on BOSU, Both hands holding ball   Manual Intervention x  17 min      Shld /GH Mobs 0 min  Inf glide grade IV/scap mob   Post/Inf Cap mobs 6 min     Thoracic/Rib manipualtion      IASTM 6 min  Posterior deltoid   PROM MT 5 min     Elbow mobs            NMR re-education x 12 minutes      T-spine Ext      GH depress/compress            Body blade      Wall ball roll 2 20 Blue ball, blue band @ forearm   Wall Ball bounce      Ball drops 2 20  red sand ball, standing and bent over   Mari Scap Bio      Floor Snow angels-sliders 2 10 Supine- ROM normalizing; still some deficit on R.    rebounder ball toss 2 10 11lb 2 hands OH, 8lb 1 hand OH   Prone scaption holds 5s 10 Orange loop around wrists       Therapeutic Exercise and NMR EXR  [x] (19140) Provided verbal/tactile cueing for activities related to strengthening, flexibility, endurance, ROM  for improvements in scapular, scapulothoracic and UE control with self care, reaching, carrying, lifting, house/yardwork, (typically 45 minutes face-to-face)  [] RE-EVAL     [x] PB(70370) x  1   [] IONTO  [x] NMR (09119) x  1   [] VASO  [x] Manual (85385) x  1    [] Other:  [] TA x       [] Mech Traction (50420)  [] ES(attended) (57203)      [] ES (un) (56007):     GOALS:    Patient stated goal: return to sport     Therapist goals for Patient:   Short Term Goals: To be achieved in: 2 weeks  1. Independent in HEP and progression per patient tolerance, in order to prevent re-injury. 2. Patient will have a decrease in pain to facilitate improvement in movement, function, and ADLs as indicated by Functional Deficits.     Long Term Goals: To be achieved in: 8 weeks  1. Disability index score of 75% or more on FOTOto assist with reaching prior level of function. 2. Patient will demonstrate increased AROM to [facility] to allow for proper joint functioning as indicated by patients Functional Deficits. 95% MET  3. Patient will demonstrate an increase in Strength to good scapular and core control, w/in 5lbs HHD for UE to allow for proper functional mobility as indicated by patients Functional Deficits. 75% MET  4. Patient will return to basketball functional activities without increased symptoms or restriction. 80% MET  5. Sleep without pain(patient specific functional goal) 80% MET    Progression Towards Functional goals:  [] Patient is progressing as expected towards functional goals listed. [] Progression is slowed due to complexities listed. [] Progression has been slowed due to co-morbidities. [x] Plan just implemented, too soon to assess goals progression  [] Other:     ASSESSMENT:  ROM improving, but still tight at end-range with flexion, ER, IR, abduction. Strength of bigger muscle groups doing well; stabilizers need work to normalize ROM and mechanics. Pain better after D2. Limited by fatigue and soreness today.         Return to Play: (if applicable)   []  Stage 1: Intro to Strength   []  Stage 2: Dynamic Strength and Intro to

## 2018-12-20 ENCOUNTER — HOSPITAL ENCOUNTER (OUTPATIENT)
Dept: PHYSICAL THERAPY | Age: 58
Setting detail: THERAPIES SERIES
Discharge: HOME | End: 2018-12-20
Admitting: ORTHOPAEDIC SURGERY
Payer: COMMERCIAL

## 2018-12-20 PROCEDURE — 97110 THERAPEUTIC EXERCISES: CPT

## 2018-12-20 PROCEDURE — 97140 MANUAL THERAPY 1/> REGIONS: CPT

## 2018-12-20 PROCEDURE — 97112 NEUROMUSCULAR REEDUCATION: CPT

## 2018-12-20 NOTE — PROGRESS NOTES
From OR awake, resp.  Easy, neuro intact, having some pain, medicated by CRNA upon arrival Statement Selected

## 2018-12-27 ENCOUNTER — HOSPITAL ENCOUNTER (OUTPATIENT)
Dept: PHYSICAL THERAPY | Age: 58
Setting detail: THERAPIES SERIES
Discharge: HOME | End: 2018-12-27
Admitting: ORTHOPAEDIC SURGERY
Payer: COMMERCIAL

## 2018-12-27 PROCEDURE — 97110 THERAPEUTIC EXERCISES: CPT

## 2018-12-27 PROCEDURE — 97140 MANUAL THERAPY 1/> REGIONS: CPT

## 2018-12-27 PROCEDURE — 97112 NEUROMUSCULAR REEDUCATION: CPT

## 2018-12-27 NOTE — FLOWSHEET NOTE
last bit of motion needed to allow for optimal shoulder mechanics and Pt resumes high level basketball.      Passive Range of Motion R (surgical)   ER @ 90 deg abduction 86 degrees   IR @ 90 deg abduction 74 degrees   Supine Flexion 152 degrees     Measurement R (surgical) L   Flexion @ 90 22lb 32lb   ER @ 0 33lb 39lb   IR @ 0 43lb 46lb   Scaption @ 90 25lb 36lb   ER @ 90 21lb 28lb       RESTRICTIONS/PRECAUTIONS: RC repair w augment patch 8/31/18    Exercises/Interventions:   Therapeutic Ex x 15 min Sets/sec Reps Notes   UBE 4 min retro  low   SC up-chops sportcord wrapped around base of [employer] leg curl machine   Supine weighted flexion 1 10 Red ball   TRX Rows Partial range   TRX partial push-up    Seated ER/IR c elbow on box 2 10 Red sand ball, slow, HHD   Supine ball plyo's 6-11lb- single arm chest pass, double arm overhead pass   BOSU up/up/down/down 2 10 planks   BOSU plank holds + leg extensions 2 10 challenging   Cable multi-directional pulls 2 10 Chest pass, extension (supinated and pronated ), HAB, flexion for shooting 3.5pl    Seated ER/IR control c ball 2 10 8inch box+pillow on tabletop, green sand ball, focusing on ecc control    Supine D2 ecc band pulls 2 10 Black tube, pain/popping better after first few reps      bodyblade Small black   Cable ER/IR 2 12 2/3 pl   [employer] with 11 lb ball 1/2 kneeling on BOSU, Both hands holding ball   Manual Intervention x  17 min      Shld /GH Mobs 0 min  Inf glide grade IV/scap mob   Post/Inf Cap mobs 6 min     Thoracic/Rib manipualtion      IASTM 6 min  Posterior deltoid   PROM MT 5 min     Elbow mobs            NMR re-education x 12 minutes      T-spine Ext      GH depress/compress            Body blade      Wall ball roll 2 20 Blue ball, blue band @ forearm   Wall Ball bounce      Ball drops 2 20  red sand ball, standing and bent over   Mari Scap Bio      Floor Snow angels-sliders 2 10 Supine- ROM normalizing; still some deficit on R.    rebounder ball toss 2

## 2018-12-31 ENCOUNTER — APPOINTMENT (OUTPATIENT)
Dept: PHYSICAL THERAPY | Age: 58
End: 2018-12-31
Payer: COMMERCIAL

## 2019-01-02 ENCOUNTER — OFFICE VISIT (OUTPATIENT)
Dept: ORTHOPEDIC SURGERY | Age: 59
End: 2019-01-02
Payer: COMMERCIAL

## 2019-01-02 VITALS — HEIGHT: 77 IN | WEIGHT: 238.54 LBS | BODY MASS INDEX: 28.17 KG/M2

## 2019-01-02 DIAGNOSIS — M25.562 ACUTE PAIN OF LEFT KNEE: Primary | ICD-10-CM

## 2019-01-02 PROCEDURE — 99214 OFFICE O/P EST MOD 30 MIN: CPT | Performed by: ORTHOPAEDIC SURGERY

## 2019-01-02 PROCEDURE — 20610 DRAIN/INJ JOINT/BURSA W/O US: CPT | Performed by: ORTHOPAEDIC SURGERY

## 2019-02-07 ENCOUNTER — HOSPITAL ENCOUNTER (OUTPATIENT)
Dept: PHYSICAL THERAPY | Age: 59
Setting detail: THERAPIES SERIES
Discharge: HOME | End: 2019-02-07
Admitting: ORTHOPAEDIC SURGERY
Payer: COMMERCIAL

## 2019-02-07 PROCEDURE — 97140 MANUAL THERAPY 1/> REGIONS: CPT

## 2019-02-07 PROCEDURE — 97110 THERAPEUTIC EXERCISES: CPT

## 2019-02-14 ENCOUNTER — HOSPITAL ENCOUNTER (OUTPATIENT)
Dept: PHYSICAL THERAPY | Age: 59
Setting detail: THERAPIES SERIES
Discharge: HOME | End: 2019-02-14
Admitting: ORTHOPAEDIC SURGERY
Payer: COMMERCIAL

## 2019-02-14 PROCEDURE — 97112 NEUROMUSCULAR REEDUCATION: CPT

## 2019-02-14 PROCEDURE — 97140 MANUAL THERAPY 1/> REGIONS: CPT

## 2019-02-14 PROCEDURE — 97110 THERAPEUTIC EXERCISES: CPT

## 2019-02-19 ENCOUNTER — HOSPITAL ENCOUNTER (OUTPATIENT)
Dept: PHYSICAL THERAPY | Age: 59
Setting detail: THERAPIES SERIES
Discharge: HOME | End: 2019-02-19
Admitting: ORTHOPAEDIC SURGERY
Payer: COMMERCIAL

## 2019-02-19 PROCEDURE — 97140 MANUAL THERAPY 1/> REGIONS: CPT

## 2019-02-19 PROCEDURE — 97110 THERAPEUTIC EXERCISES: CPT

## 2019-02-20 ENCOUNTER — OFFICE VISIT (OUTPATIENT)
Dept: ORTHOPEDIC SURGERY | Age: 59
End: 2019-02-20
Payer: COMMERCIAL

## 2019-02-20 VITALS — HEIGHT: 77 IN | BODY MASS INDEX: 28.1 KG/M2 | WEIGHT: 238 LBS

## 2019-02-20 DIAGNOSIS — M25.561 ACUTE PAIN OF RIGHT KNEE: Primary | ICD-10-CM

## 2019-02-20 PROCEDURE — 99214 OFFICE O/P EST MOD 30 MIN: CPT | Performed by: ORTHOPAEDIC SURGERY

## 2019-02-21 ENCOUNTER — HOSPITAL ENCOUNTER (OUTPATIENT)
Dept: PHYSICAL THERAPY | Age: 59
Setting detail: THERAPIES SERIES
Discharge: HOME | End: 2019-02-21
Admitting: ORTHOPAEDIC SURGERY
Payer: COMMERCIAL

## 2019-02-21 PROCEDURE — 97140 MANUAL THERAPY 1/> REGIONS: CPT

## 2019-02-21 PROCEDURE — 97110 THERAPEUTIC EXERCISES: CPT

## 2019-02-21 PROCEDURE — 97112 NEUROMUSCULAR REEDUCATION: CPT

## 2019-02-26 ENCOUNTER — HOSPITAL ENCOUNTER (OUTPATIENT)
Dept: PHYSICAL THERAPY | Age: 59
Setting detail: THERAPIES SERIES
Discharge: HOME | End: 2019-02-26
Admitting: ORTHOPAEDIC SURGERY
Payer: COMMERCIAL

## 2019-02-26 PROCEDURE — 97140 MANUAL THERAPY 1/> REGIONS: CPT

## 2019-02-26 PROCEDURE — 97110 THERAPEUTIC EXERCISES: CPT

## 2019-02-26 PROCEDURE — 97112 NEUROMUSCULAR REEDUCATION: CPT

## 2019-02-28 ENCOUNTER — HOSPITAL ENCOUNTER (OUTPATIENT)
Dept: PHYSICAL THERAPY | Age: 59
Setting detail: THERAPIES SERIES
Discharge: HOME | End: 2019-02-28
Admitting: ORTHOPAEDIC SURGERY
Payer: COMMERCIAL

## 2019-02-28 PROCEDURE — 97112 NEUROMUSCULAR REEDUCATION: CPT

## 2019-02-28 PROCEDURE — 97140 MANUAL THERAPY 1/> REGIONS: CPT

## 2019-02-28 PROCEDURE — 97110 THERAPEUTIC EXERCISES: CPT

## 2019-03-05 ENCOUNTER — HOSPITAL ENCOUNTER (OUTPATIENT)
Dept: PHYSICAL THERAPY | Age: 59
Setting detail: THERAPIES SERIES
Discharge: HOME | End: 2019-03-05
Admitting: ORTHOPAEDIC SURGERY
Payer: COMMERCIAL

## 2019-03-05 PROCEDURE — 97140 MANUAL THERAPY 1/> REGIONS: CPT

## 2019-03-05 PROCEDURE — 97110 THERAPEUTIC EXERCISES: CPT

## 2019-03-12 ENCOUNTER — HOSPITAL ENCOUNTER (OUTPATIENT)
Dept: PHYSICAL THERAPY | Age: 59
Setting detail: THERAPIES SERIES
Discharge: HOME | End: 2019-03-12
Admitting: ORTHOPAEDIC SURGERY
Payer: COMMERCIAL

## 2019-03-12 PROCEDURE — 97140 MANUAL THERAPY 1/> REGIONS: CPT

## 2019-03-12 PROCEDURE — 97110 THERAPEUTIC EXERCISES: CPT

## 2019-03-13 ENCOUNTER — OFFICE VISIT (OUTPATIENT)
Dept: ORTHOPEDIC SURGERY | Age: 59
End: 2019-03-13
Payer: COMMERCIAL

## 2019-03-13 VITALS — HEIGHT: 77 IN | BODY MASS INDEX: 28.1 KG/M2 | WEIGHT: 238 LBS

## 2019-03-13 DIAGNOSIS — M22.2X1 PATELLOFEMORAL SYNDROME OF RIGHT KNEE: Primary | ICD-10-CM

## 2019-03-13 PROCEDURE — 99214 OFFICE O/P EST MOD 30 MIN: CPT | Performed by: ORTHOPAEDIC SURGERY

## 2019-03-14 ENCOUNTER — HOSPITAL ENCOUNTER (OUTPATIENT)
Dept: PHYSICAL THERAPY | Age: 59
Setting detail: THERAPIES SERIES
Discharge: HOME | End: 2019-03-14
Admitting: ORTHOPAEDIC SURGERY
Payer: COMMERCIAL

## 2019-03-14 PROCEDURE — 97110 THERAPEUTIC EXERCISES: CPT

## 2019-03-14 PROCEDURE — 97140 MANUAL THERAPY 1/> REGIONS: CPT

## 2019-03-21 ENCOUNTER — APPOINTMENT (OUTPATIENT)
Dept: PHYSICAL THERAPY | Age: 59
End: 2019-03-21
Payer: COMMERCIAL

## 2019-03-21 ENCOUNTER — HOSPITAL ENCOUNTER (OUTPATIENT)
Dept: PHYSICAL THERAPY | Age: 59
Setting detail: THERAPIES SERIES
Discharge: HOME | End: 2019-03-21
Payer: COMMERCIAL

## 2019-03-21 PROCEDURE — 97110 THERAPEUTIC EXERCISES: CPT

## 2019-03-21 PROCEDURE — 97161 PT EVAL LOW COMPLEX 20 MIN: CPT

## 2019-03-26 ENCOUNTER — HOSPITAL ENCOUNTER (OUTPATIENT)
Dept: PHYSICAL THERAPY | Age: 59
Setting detail: THERAPIES SERIES
Discharge: HOME | End: 2019-03-26
Payer: COMMERCIAL

## 2019-03-26 PROCEDURE — 97140 MANUAL THERAPY 1/> REGIONS: CPT

## 2019-03-26 PROCEDURE — 97110 THERAPEUTIC EXERCISES: CPT

## 2021-08-24 ENCOUNTER — HOSPITAL ENCOUNTER (OUTPATIENT)
Dept: PHYSICAL THERAPY | Age: 61
Setting detail: THERAPIES SERIES
End: 2021-08-24
Payer: COMMERCIAL

## 2021-08-26 ENCOUNTER — HOSPITAL ENCOUNTER (OUTPATIENT)
Dept: PHYSICAL THERAPY | Age: 61
Setting detail: THERAPIES SERIES
Discharge: HOME | End: 2021-08-26
Payer: COMMERCIAL

## 2021-08-26 PROCEDURE — 97140 MANUAL THERAPY 1/> REGIONS: CPT

## 2021-08-26 PROCEDURE — 97161 PT EVAL LOW COMPLEX 20 MIN: CPT

## 2021-08-26 NOTE — PLAN OF CARE
Coral Velasco  Phone: [phone]   Fax:     [phone]                                                       Physical Therapy Certification    Dear Referring Practitioner: Dr Damir Lange,    We had the pleasure of evaluating the following patient for physical therapy services at [address] [place] [address]. A summary of our findings can be found in the initial assessment below. This includes our plan of care. If you have any questions or concerns regarding these findings, please do not hesitate to contact me at the office phone number checked above. Thank you for the referral.       Physician Signature:_______________________________Date:__________________  By signing above (or electronic signature), therapists plan is approved by physician              Patient: Nesha Reynolds   : 1960   MRN: [id number]  Referring Physician: Referring Practitioner: Dr Damir Lange      Evaluation Date: 2021      Medical Diagnosis Information:  Diagnosis: R medial epidondylitis, R elbow pain m25,521   Treatment Diagnosis: R medial epidondylitis, R elbow pain m25,521                                         Insurance information:      Precautions/ Contra-indications: NA  Latex Allergy:   [x]  NO      []YES  Preferred Language for Healthcare:   [x]English       []other:    C-SSRS Triggered by Intake questionnaire (Past 2 wk assessment ):   [x] No, Questionnaire did not trigger screening.   [] Yes, Patient intake triggered C-SSRS Screening      [] C-SSRS Screening completed  [] PCP notified via Epic     SUBJECTIVE: Patient stated complaint:Patient presents today with complaints of R medial elbow pain that has been progressing in nature. He reports pain with throwing basketball/ball for dog and blocking out in basketball. He reports no history of \"pop\", sharp pain with throwing. Pain does not radiate.   Denies any numbness. Relevant Medical History:R RC repair  Functional Scale/Score: DASH    Pain Scale: 0-7/10  Easing factors: not throwing, rest  Provocative factors: throwing, boxing out     Type: []Constant   [x]Intermittent  []Radiating []Localized []other:     Numbness/Tingling: denies    Occupation/School:[employer]     Living Status/Prior Level of Function: Independent with ADLs and IADLs,      OBJECTIVE:     CERV ROM     Cervical Flexion WNL    Cervical Extension WNL    Cervical SB WNL    Cervical rotation WNL         ROM Left Right   Shoulder Flex WNL WNL    Elbow flex WNL Pain with OP   Elbow ext WNL WNL   Pro/Sup WNL WNL   Wrist flex/ext WNL WNL        Strength  Left Right   Shoulder Flex WNL WNL   Shoulder IR WNL WNL   Elbow flex/ext WNL 5/5   Pro/Sup WNL 4 pain/4+   Wrist flex/ext WNL 4 pain/4+      Reflexes Normal Abnormal Comments   [x]ALL NORMAL            S1-2 Seated achilles [] []    S1-2 Prone knee bend [] []    L3-4 Patellar tendon [] []    C5-6 Biceps [] []    C6 Brachioradialis [] []    C7-8 Triceps [] []    Clonus [] []    Babinski [] []    Saleh's [] []      Reflexes/Sensation:    [x]Dermatomes/Myotomes intact    [x]Reflexes equal and normal bilaterally   []Other:    Joint mobility: increased valgus movement, non painful   []Normal    []Hypo   []Hyper    Palpation: TTP over medial epicondyle    Functional Mobility/Transfers: WNL    Posture: WNL    Bandages/Dressings/Incisions: NA     Gait: (include devices/WB status): WNL     Orthopedic Special Tests: Increased valgus stress                       [x] Patient history, allergies, meds reviewed. Medical chart reviewed. See intake form. Review Of Systems (ROS):  [x]Performed Review of systems (Integumentary, CardioPulmonary, Neurological) by intake and observation. Intake form has been scanned into medical record. Patient has been instructed to contact their primary care physician regarding ROS issues if not already being addressed at this time. functional strength   []other:      Functional Activity Limitations (from functional questionnaire and intake)   []Reduced ability to tolerate prolonged functional positions   []Reduced ability or difficulty with changes of positions or transfers between positions   []Reduced ability to maintain good posture and demonstrate good body mechanics with sitting, bending, and lifting   [] Reduced ability or tolerance with driving and/or computer work   []Reduced ability to perform lifting, reaching, carrying tasks   []Reduced ability to reach behind back   []Reduced ability to sleep    []Reduced ability to tolerate any impact through UE or spine   []Reduced ability to  or hold objects   []Reduced ability to throw or toss an object   []other:    Participation Restrictions   []Reduced participation in self care activities   []Reduced participation in home management activities   []Reduced participation in work activities   []Reduced participation in social activities. []Reduced participation in sport/recreational activities. Classification/Subgrouping:   []signs/symptoms consistent with post-surgical status including decreased ROM, strength and function.     []signs/symptoms consistent with joint sprain/strain    []signs/symptoms consistent with shoulder impingement (internal, external, primary or secondary)   []signs/symptoms consistent with shoulder/elbow/wrist tendinopathy   []Signs/symptoms consistent with Rotator cuff tear   []sign/symptoms consistent with labral tear   []signs/symptoms consistent with rib dysfunction   []signs/symptoms consistent with postural dysfunction   []signs/symptoms consistent with Glenohumeral IR Deficit - <45 degrees   []signs/symptoms consistent with facet dysfunction of cervical/thoracic spine   []signs/symptoms consistent with pathology which may benefit from Dry Needling   []signs/symptoms which may limit the use of advanced manual therapy techniques: (Elevated CV risk profile, recent trauma, intolerance to end range positions, prior TIA, visual issues, UE neurological compromise )     Prognosis/Rehab Potential:      []Excellent   []Good    []Fair   []Poor    Tolerance of evaluation/treatment:    []Excellent   []Good    []Fair   []Poor    Physical Therapy Evaluation Complexity Justification  [x] A history of present problem with:  [] no personal factors and/or comorbidities that impact the plan of care;  []1-2 personal factors and/or comorbidities that impact the plan of care  []3 personal factors and/or comorbidities that impact the plan of care  [x] An examination of body systems using standardized tests and measures addressing any of the following: body structures and functions (impairments), activity limitations, and/or participation restrictions;:  [] a total of 1-2 or more elements   [] a total of 3 or more elements   [] a total of 4 or more elements   [x] A clinical presentation with:  [] stable and/or uncomplicated characteristics   [] evolving clinical presentation with changing characteristics  [] unstable and unpredictable characteristics;   [x] Clinical decision making of [] low, [] moderate, [] high complexity using standardized patient assessment instrument and/or measurable assessment of functional outcome. [] EVAL (LOW) 34870 (typically 20 minutes face-to-face)  [] EVAL (MOD) 28380 (typically 30 minutes face-to-face)  [] EVAL (HIGH) 39907 (typically 45 minutes face-to-face)  [] RE-EVAL     PLAN:    Frequency/Duration:  1-2 days per week for 5 Weeks:  Interventions:  [x]  Therapeutic exercise including: strength training, ROM, for scapula, core and Upper extremity, including postural re-education. [x]  NMR activation and proprioception for UE, periscapular and RC muscles and Core, including postural re-education.     [x]  Manual therapy as indicated for shoulder, scapula, spine and associated soft tissue including: Dry Needling/IASTM, STM, PROM, Gr I-IV mobilizations, manipulation. [x] Modalities as needed that may include: thermal agents, E-stim, Biofeedback, US, iontophoresis as indicated  [x] Patient education on joint protection, postural re-education, activity modification, progression of HEP. HEP instruction:  (see scanned forms)    GOALS:  Patient stated goal: no pain  [] Progressing: [] Met: [] Not Met: [] Adjusted    Therapist goals for Patient:   Short Term Goals: To be achieved in: 2 weeks  1. Independent in HEP and progression per patient tolerance, in order to prevent re-injury. [] Progressing: [] Met: [] Not Met: [] Adjusted  2. Patient will have a decrease in pain to facilitate improvement in movement, function, and ADLs as indicated by Functional Deficits. [] Progressing: [] Met: [] Not Met: [] Adjusted    Long Term Goals: To be achieved in: 5 weeks  1. Disability index score of 5% or less for the Quick DASH to assist with reaching prior level of function. [] Progressing: [] Met: [] Not Met: [] Adjusted  2. Patient will demonstrate increased AROM to [facility] to allow for proper joint functioning as indicated by Functional Deficits. [] Progressing: [] Met: [] Not Met: [] Adjusted  3. Patient will demonstrate an increase in NM recruitment/activation and overall GH and scapular strength to within n5lbs HHD or WNL for proper functional mobility as indicated by patients Functional Deficits. [] Progressing: [] Met: [] Not Met: [] Adjusted  4. Patient will return to throwing with no sharp pain  activities without increased symptoms or restriction. [] Progressing: [] Met: [] Not Met: [] Adjusted  5.  No pain with basketball related activity(patient specific functional goal)     [] Progressing: [] Met: [] Not Met: [] Adjusted    Electronically signed by:  Gilberto Feng PT

## 2021-08-26 NOTE — FLOWSHEET NOTE
[facility]  [address] [place]  Phone: [phone] Fax: [phone]    Physical Therapy Treatment Note/ Progress Report:     Date:  2021    Patient Name:  Nesha Reynolds    :  1960  MRN: [id number]  Restrictions/Precautions:    Medical/Treatment Diagnosis Information:  · Diagnosis: R medial epidondylitis, R elbow pain m25,521  · Treatment Diagnosis: R medial epidondylitis, R elbow pain E97,505  Insurance/Certification information:     Physician Information:  Referring Practitioner: Dr Eliseo Gallardo of care signed (Y/N):     Date of Patient follow up with Physician:      Progress Report: []  Yes  []  No     Date Range for reporting period:  Beginnin21  Ending:       Progress report due (10 Rx/or 30 days whichever is less):       Recertification due (POC duration/ or 90 days whichever is less):       Visit # Insurance Allowable Auth Needed   1 med []Yes    []No     Pain level:  0/7/10     SUBJECTIVE:  See eval    OBJECTIVE: See eval   Observation:    Test measurements:      RESTRICTIONS/PRECAUTIONS: increased valgus laxity, pain with throwing, TTP over R medial epi    Exercises/Interventions:   Therapeutic Ex (36355)  Min: Sets/sec Reps CUES/Notes   UBE      Pendulum/Ball rolls      Cane AAROM flex/press      3 way Isomet      T- band Row/pinch      T- band lower pinch      T- band ER activation      Supine SA punch      SL ER/SL punch      Prone Rows/ext      Prone HAB/Prone Flex      Seat Table slides/Wall Slides      Seated HH Depression      No Money      Scap Wall Lat touches/wall walks            Standing flex/scap      Standing Punch      Lawnmower      IR with band 1 15 blue   Wrist flex st at wall 20 sec  3    Ecc wrist flexion 1 15 7 lb         Manual Intervention  (05301)  Min:      Shld /GH Mobs      Post Cap mobs      Thoracic/Rib manipualtion      CT MT/Mobs      PROM MT      Elbow mobs      GISTM 10 (59454) Provided manual therapy to mobilize soft tissue/joints of cervical/CT, scapular GHJ and UE for the purpose of modulating pain, promoting relaxation,  increasing ROM, reducing/eliminating soft tissue swelling/inflammation/restriction, improving soft tissue extensibility and allowing for proper ROM for normal function with self care, reaching, carrying, lifting, house/yardwork, driving/computer work    Modalities:      Charges:  Timed Code Treatment Minutes: 20   Total Treatment Minutes: 45       [x] EVAL (LOW) 94084 (typically 20 minutes face-to-face)  [] EVAL (MOD) 38280 (typically 30 minutes face-to-face)  [] EVAL (HIGH) 54402 (typically 45 minutes face-to-face)  [] RE-EVAL     [] DW(24950) x     [] DRY NEEDLE 1 OR 2 MUSCLES  [] NMR (70799) x     [] DRY NEEDLE 3+ MUSCLES  [x] Manual (22635) x       [] TA (62591) x     [] Mech Traction (62035)  [] ES(attended) (89458)     [] ES (un) (65823):   [] VASO (06568)  [] Other:    If [facility] Please Indicate Time In/Out  CPT Code Time in Time out                                   GOALS:    Patient stated goal: no pain  [] Progressing: [] Met: [] Not Met: [] Adjusted    Therapist goals for Patient:   Short Term Goals: To be achieved in: 2 weeks  1. Independent in HEP and progression per patient tolerance, in order to prevent re-injury. [] Progressing: [] Met: [] Not Met: [] Adjusted  2. Patient will have a decrease in pain to facilitate improvement in movement, function, and ADLs as indicated by Functional Deficits. [] Progressing: [] Met: [] Not Met: [] Adjusted    Long Term Goals: To be achieved in: 5 weeks  1. Disability index score of 5% or less for the Quick DASH to assist with reaching prior level of function. [] Progressing: [] Met: [] Not Met: [] Adjusted  2. Patient will demonstrate increased AROM to [facility] to allow for proper joint functioning as indicated by Functional Deficits. [] Progressing: [] Met: [] Not Met: [] Adjusted  3.  Patient will demonstrate an scheduled/recommended follow up visits, this note will serve as a discharge from care along with the most recent update on progress.

## 2021-08-31 ENCOUNTER — HOSPITAL ENCOUNTER (OUTPATIENT)
Dept: PHYSICAL THERAPY | Age: 61
Setting detail: THERAPIES SERIES
Discharge: HOME | End: 2021-08-31
Payer: COMMERCIAL

## 2021-08-31 PROCEDURE — 97140 MANUAL THERAPY 1/> REGIONS: CPT

## 2021-08-31 PROCEDURE — 20560 NDL INSJ W/O NJX 1 OR 2 MUSC: CPT

## 2021-08-31 PROCEDURE — 97110 THERAPEUTIC EXERCISES: CPT

## 2021-08-31 NOTE — FLOWSHEET NOTE
[facility] [address] [place]  Phone: [phone] Fax: [phone]    Physical Therapy Treatment Note/ Progress Report:     Date:  2021    Patient Name:  Krissy Morgan    :  1960  MRN: [id number]  Restrictions/Precautions:    Medical/Treatment Diagnosis Information:  · Diagnosis: R medial epidondylitis, R elbow pain m25,521  · Treatment Diagnosis: R medial epidondylitis, R elbow pain L07,967  Insurance/Certification information:     Physician Information:  Referring Practitioner: Dr Valere Landau of care signed (Y/N):     Date of Patient follow up with Physician:      Progress Report: []  Yes  []  No     Date Range for reporting period:  Beginnin21  Ending:       Progress report due (10 Rx/or 30 days whichever is less):       Recertification due (POC duration/ or 90 days whichever is less):       Visit # Insurance Allowable Auth Needed   2 med []Yes    []No     Pain level:  0/7/10     SUBJECTIVE:  Patient reports shoulder and elbow are sore, over did HEP. Threw a rock and had sharp pain in medial elbow.   Lateral elbow is sore as well    OBJECTIVE: See eval   Observation:    Test measurements:      RESTRICTIONS/PRECAUTIONS: increased valgus laxity, pain with throwing, TTP over R medial epi    Exercises/Interventions:   Therapeutic Ex (30581)  Min: Sets/sec Reps CUES/Notes   UBE      Pendulum/Ball rolls      Cane AAROM flex/press      3 way Isomet      T- band Row/pinch      T- band lower pinch      T- band ER activation      Supine SA punch      SL ER/SL punch      Prone Rows/ext      Prone HAB/Prone Flex      Seat Table slides/Wall Slides      Seated HH Depression      No Money      Scap Wall Lat touches/wall walks            Standing flex/scap      Wrist flexor/ext st 30 sec 3    Lawnmower      IR/ER with band 1 15 blue   Wrist flex st at wall 20 sec  3    Ecc wrist flexion 1 15 7 lb         Manual Intervention (26148)  Min:      Shld /GH Mobs 15 min  Inf/post/IR to improve flex and abd   Post Cap mobs      Thoracic/Rib manipualtion      CT MT/Mobs      DN 10 min  Wrist flex, med epidcondyle   Elbow mobs 5 min Lateral glide    GISTM 0 min     NMR re-education (07715)  Min:      T-spine Ext      GH depress/compress      Scap/GH NMR      Body blade      Wall ball roll      Wall Ball bounce      Ball drops      Mari Scap Bio      Floor Snow angels-sliders            Therapeutic Activity (48489)  Min:      UE throwing porgression      Dynamic UE stability      Earthquake Bar      Bodyblade                Therapeutic Exercise and NMR EXR  [x] (78772) Provided verbal/tactile cueing for activities related to strengthening, flexibility, endurance, ROM  for improvements in scapular, scapulothoracic and UE control with self care, reaching, carrying, lifting, house/yardwork, driving/computer work. [x] (15367) Provided verbal/tactile cueing for activities related to improving balance, coordination, kinesthetic sense, posture, motor skill, proprioception  to assist with  scapular, scapulothoracic and UE control with self care, reaching, carrying, lifting, house/yardwork, driving/computer work. Therapeutic Activities:    [] (11157 or 46518) Provided verbal/tactile cueing for activities related to improving balance, coordination, kinesthetic sense, posture, motor skill, proprioception and motor activation to allow for proper function of scapular, scapulothoracic and UE control with self care, carrying, lifting, driving/computer work.      Home Exercise Program:    [x] (87180) Reviewed/Progressed HEP activities related to strengthening, flexibility, endurance, ROM of scapular, scapulothoracic and UE control with self care, reaching, carrying, lifting, house/yardwork, driving/computer work  [] (20602) Reviewed/Progressed HEP activities related to improving balance, coordination, kinesthetic sense, posture, motor skill, proprioception of scapular, scapulothoracic and UE control with self care, reaching, carrying, lifting, house/yardwork, driving/computer work      Manual Treatments:  PROM / STM / Oscillations-Mobs:  G-I, II, III, IV (PA's, Inf., Post.)  [x] (20172) Provided manual therapy to mobilize soft tissue/joints of cervical/CT, scapular GHJ and UE for the purpose of modulating pain, promoting relaxation,  increasing ROM, reducing/eliminating soft tissue swelling/inflammation/restriction, improving soft tissue extensibility and allowing for proper ROM for normal function with self care, reaching, carrying, lifting, house/yardwork, driving/computer work      Dry needling manual therapy: consisted on the placement of 3 needles in the following muscles:  wrist flexor,  Wrist extensor mm,  . A 30 mm needle was inserted, piston, rotated, and coned to produce intramuscular mobilization. These techniques were used to restore functional range of motion, reduce muscle spasm and induce healing in the corresponding musculature. (95163)  Clean Technique was utilized today while applying Dry needling treatment. The treatment sites where cleaned with 70% solution of  isopropyl alcohol . The PT washed their hands and utilized treatment gloves along with hand  prior to inserting the needles. All needles where removed and discarded in the appropriate sharps container.      Modalities:      Charges:  Timed Code Treatment Minutes: 45   Total Treatment Minutes: 45       [] EVAL (LOW) 41491 (typically 20 minutes face-to-face)  [] EVAL (MOD) 36672 (typically 30 minutes face-to-face)  [] EVAL (HIGH) 92871 (typically 45 minutes face-to-face)  [] RE-EVAL     [x] FL(05175) x     [x] DRY NEEDLE 1 OR 2 MUSCLES  [] NMR (87027) x     [] DRY NEEDLE 3+ MUSCLES  [x] Manual (95675) x       [] TA (18855) x     [] [facility] Traction (45753)  [] ES(attended) (23157)     [] ES (un) (39596):   [] VASO (51527)  [] Other:    If [facility] Please Indicate Time In/Out  CPT Code Time in Time out                                   GOALS:    Patient stated goal: no pain  [] Progressing: [] Met: [] Not Met: [] Adjusted    Therapist goals for Patient:   Short Term Goals: To be achieved in: 2 weeks  1. Independent in HEP and progression per patient tolerance, in order to prevent re-injury. [] Progressing: [] Met: [] Not Met: [] Adjusted  2. Patient will have a decrease in pain to facilitate improvement in movement, function, and ADLs as indicated by Functional Deficits. [] Progressing: [] Met: [] Not Met: [] Adjusted    Long Term Goals: To be achieved in: 5 weeks  1. Disability index score of 5% or less for the Quick DASH to assist with reaching prior level of function. [] Progressing: [] Met: [] Not Met: [] Adjusted  2. Patient will demonstrate increased AROM to [facility] to allow for proper joint functioning as indicated by Functional Deficits. [] Progressing: [] Met: [] Not Met: [] Adjusted  3. Patient will demonstrate an increase in NM recruitment/activation and overall GH and scapular strength to within n5lbs HHD or WNL for proper functional mobility as indicated by patients Functional Deficits. [] Progressing: [] Met: [] Not Met: [] Adjusted  4. Patient will return to throwing with no sharp pain  activities without increased symptoms or restriction. [] Progressing: [] Met: [] Not Met: [] Adjusted  5. No pain with basketball related activity(patient specific functional goal)     [] Progressing: [] Met: [] Not Met: [] Adjusted      ASSESSMENT:  Romero well. Shoulder stiff and elbow is sore due to compensation from elbow and over doing HEP.   Discussed modifying loading    Return to Play: (if applicable)   []  Stage 1: Intro to Strength   []  Stage 2: Dynamic Strength and Intro to Plyometrics   []  Stage 3: Advanced Plyometrics and Intro to Throwing   []  Stage 4: Sport specific Training/Return to Sport     []  Ready to Return to Play, Meets All Above Stages   []  Not Ready for Return to Sports   Comments:      Treatment/Activity Tolerance:  [x] Patient tolerated treatment well [] Patient limited by fatique  [] Patient limited by pain  [] Patient limited by other medical complications  [] Other:     Overall Progression Towards Functional goals/ Treatment Progress Update:  [] Patient is progressing as expected towards functional goals listed. [] Progression is slowed due to complexities/Impairments listed. [] Progression has been slowed due to co-morbidities. [x] Plan just implemented, too soon to assess goals progression <30days   [] Goals require adjustment due to lack of progress  [] Patient is not progressing as expected and requires additional follow up with physician  [] Other    Prognosis for POC: [x] Good [] Fair  [] Poor    Patient requires continued skilled intervention: [x] Yes  [] No      PLAN: See eval  [] Continue per plan of care [] Alter current plan (see comments)  [x] Plan of care initiated [] Hold pending MD visit [] Discharge    Electronically signed by: Edgard Michelle PT     Note: If patient does not return for scheduled/recommended follow up visits, this note will serve as a discharge from care along with the most recent update on progress.

## 2021-09-02 ENCOUNTER — HOSPITAL ENCOUNTER (OUTPATIENT)
Dept: PHYSICAL THERAPY | Age: 61
Setting detail: THERAPIES SERIES
Discharge: HOME | End: 2021-09-02
Payer: COMMERCIAL

## 2021-09-02 NOTE — FLOWSHEET NOTE
[facility]    Physical Therapy  Cancellation/No-show Note  Patient Name:  Aaron Bashir  :  1960   Date:  2021  Cancelled visits to date: 1  No-shows to date: 0    For today's appointment patient:  [x]  Cancelled  []  Rescheduled appointment  []  No-show     Reason given by patient:  []  Patient ill  []  Conflicting appointment  []  No transportation    []  Conflict with work  []  No reason given  []  Other:   Patient stopped in to discuss he was having surgery on L shoulder on 21. He had dental work this am and just came from Ortho.   Wants to push to next week   Comments:      Electronically signed by:  Yue Palm PT, PT

## 2021-09-07 ENCOUNTER — HOSPITAL ENCOUNTER (OUTPATIENT)
Dept: PHYSICAL THERAPY | Age: 61
Setting detail: THERAPIES SERIES
Discharge: HOME | End: 2021-09-07
Payer: COMMERCIAL

## 2021-09-07 PROCEDURE — 20560 NDL INSJ W/O NJX 1 OR 2 MUSC: CPT

## 2021-09-07 PROCEDURE — 97140 MANUAL THERAPY 1/> REGIONS: CPT

## 2021-09-07 NOTE — FLOWSHEET NOTE
[place]  [address] [place]  Phone: [phone] Fax: [phone]    Physical Therapy Treatment Note/ Progress Report:     Date:  2021    Patient Name:  iVky Zhang    :  1960  MRN: [id number]  Restrictions/Precautions:    Medical/Treatment Diagnosis Information:  · Diagnosis: R medial epidondylitis, R elbow pain [id number],521  · Treatment Diagnosis: R medial epidondylitis, R elbow pain L11,475  Insurance/Certification information:     Physician Information:  Referring Practitioner: Dr Ashley Trotter of care signed (Y/N):     Date of Patient follow up with Physician:      Progress Report: []  Yes  []  No     Date Range for reporting period:  Beginnin21  Ending:       Progress report due (10 Rx/or 30 days whichever is less):       Recertification due (POC duration/ or 90 days whichever is less):       Visit # Insurance Allowable Auth Needed   3 med []Yes    []No     Pain level:  0/7/10     SUBJECTIVE:  Patient reports elbow is doing better, less pain at rest.  L shoulder surgery next tuesday    OBJECTIVE: See eval   Observation:    Test measurements:      RESTRICTIONS/PRECAUTIONS: increased valgus laxity, pain with throwing, TTP over R medial epi    Exercises/Interventions:   Therapeutic Ex (41648)  Min: Sets/sec Reps CUES/Notes   UBE      Pendulum/Ball rolls      Cane AAROM flex/press      3 way Isomet      T- band Row/pinch      T- band lower pinch      T- band ER activation      Supine SA punch      SL ER/SL punch      Prone Rows/ext      Prone HAB/Prone Flex      Seat Table slides/Wall Slides      Seated HH Depression      No Money      Scap Wall Lat touches/wall walks            Standing flex/scap      Wrist flexor/ext st 30 sec 3    Lawnmower      IR/ER with band 1 15 blue   Wrist flex st at wall 20 sec  3    Ecc wrist flexion 1 15 7 lb         Manual Intervention  (27112)  Min:      Shld /GH Mobs 15 min Inf/post/IR to improve flex and abd   Wrist mobs to improve flex/ext 5min     Thoracic/Rib manipualtion      CT MT/Mobs      DN 10 min  Wrist flex, med epidcondyle   Elbow mobs 5 min Lateral glide    GISTM 0 min     NMR re-education (31013)  Min:      T-spine Ext      GH depress/compress      Scap/GH NMR      Body blade      Wall ball roll      Wall Ball bounce      Ball drops      Mari Scap Bio      Floor Snow angels-sliders            Therapeutic Activity (49168)  Min:      UE throwing porgression      Dynamic UE stability      Earthquake Bar      Bodyblade                Therapeutic Exercise and NMR EXR  [x] (39782) Provided verbal/tactile cueing for activities related to strengthening, flexibility, endurance, ROM  for improvements in scapular, scapulothoracic and UE control with self care, reaching, carrying, lifting, house/yardwork, driving/computer work. [x] (07104) Provided verbal/tactile cueing for activities related to improving balance, coordination, kinesthetic sense, posture, motor skill, proprioception  to assist with  scapular, scapulothoracic and UE control with self care, reaching, carrying, lifting, house/yardwork, driving/computer work. Therapeutic Activities:    [] (56402 or 15169) Provided verbal/tactile cueing for activities related to improving balance, coordination, kinesthetic sense, posture, motor skill, proprioception and motor activation to allow for proper function of scapular, scapulothoracic and UE control with self care, carrying, lifting, driving/computer work.      Home Exercise Program:    [x] (74855) Reviewed/Progressed HEP activities related to strengthening, flexibility, endurance, ROM of scapular, scapulothoracic and UE control with self care, reaching, carrying, lifting, house/yardwork, driving/computer work  [] (29381) Reviewed/Progressed HEP activities related to improving balance, coordination, kinesthetic sense, posture, motor skill, proprioception of scapular, scapulothoracic and UE control with self care, reaching, carrying, lifting, house/yardwork, driving/computer work      Manual Treatments:  PROM / STM / Oscillations-Mobs:  G-I, II, III, IV (PA's, Inf., Post.)  [x] (56648) Provided manual therapy to mobilize soft tissue/joints of cervical/CT, scapular GHJ and UE for the purpose of modulating pain, promoting relaxation,  increasing ROM, reducing/eliminating soft tissue swelling/inflammation/restriction, improving soft tissue extensibility and allowing for proper ROM for normal function with self care, reaching, carrying, lifting, house/yardwork, driving/computer work      Dry needling manual therapy: consisted on the placement of 3 needles in the following muscles:  wrist flexor,  Wrist extensor mm,  . A 30 mm needle was inserted, piston, rotated, and coned to produce intramuscular mobilization. These techniques were used to restore functional range of motion, reduce muscle spasm and induce healing in the corresponding musculature. (79544)  Clean Technique was utilized today while applying Dry needling treatment. The treatment sites where cleaned with 70% solution of  isopropyl alcohol . The PT washed their hands and utilized treatment gloves along with hand  prior to inserting the needles. All needles where removed and discarded in the appropriate sharps container.      Modalities:      Charges:  Timed Code Treatment Minutes: 45   Total Treatment Minutes: 45       [] EVAL (LOW) 53054 (typically 20 minutes face-to-face)  [] EVAL (MOD) 09163 (typically 30 minutes face-to-face)  [] EVAL (HIGH) 33316 (typically 45 minutes face-to-face)  [] RE-EVAL     [] RR(07119) x     [x] DRY NEEDLE 1 OR 2 MUSCLES  [] NMR (37233) x     [] DRY NEEDLE 3+ MUSCLES  [x] Manual (22484) x2      [] TA (31135) x     [] Mech Traction (73823)  [] ES(attended) (39069)     [] ES (un) (10243):   [] VASO (58386)  [] Other:    If [facility] Please Indicate Time In/Out  CPT Code Time in Time out GOALS:    Patient stated goal: no pain  [] Progressing: [] Met: [] Not Met: [] Adjusted    Therapist goals for Patient:   Short Term Goals: To be achieved in: 2 weeks  1. Independent in HEP and progression per patient tolerance, in order to prevent re-injury. [] Progressing: [] Met: [] Not Met: [] Adjusted  2. Patient will have a decrease in pain to facilitate improvement in movement, function, and ADLs as indicated by Functional Deficits. [] Progressing: [] Met: [] Not Met: [] Adjusted    Long Term Goals: To be achieved in: 5 weeks  1. Disability index score of 5% or less for the Quick DASH to assist with reaching prior level of function. [] Progressing: [] Met: [] Not Met: [] Adjusted  2. Patient will demonstrate increased AROM to [employer] to allow for proper joint functioning as indicated by Functional Deficits. [] Progressing: [] Met: [] Not Met: [] Adjusted  3. Patient will demonstrate an increase in NM recruitment/activation and overall GH and scapular strength to within n5lbs HHD or WNL for proper functional mobility as indicated by patients Functional Deficits. [] Progressing: [] Met: [] Not Met: [] Adjusted  4. Patient will return to throwing with no sharp pain  activities without increased symptoms or restriction. [] Progressing: [] Met: [] Not Met: [] Adjusted  5. No pain with basketball related activity(patient specific functional goal)     [] Progressing: [] Met: [] Not Met: [] Adjusted      ASSESSMENT:  Mando well. Shoulder IR improved and flexion has improved with decreased end range symptoms. Progress as mando.     Return to Play: (if applicable)   []  Stage 1: Intro to Strength   []  Stage 2: Dynamic Strength and Intro to Plyometrics   []  Stage 3: Advanced Plyometrics and Intro to Throwing   []  Stage 4: Sport specific Training/Return to Sport     []  Ready to Return to Play, Meets All Above Stages   []  Not Ready for Return to Sports   Comments: Treatment/Activity Tolerance:  [x] Patient tolerated treatment well [] Patient limited by fatique  [] Patient limited by pain  [] Patient limited by other medical complications  [] Other:     Overall Progression Towards Functional goals/ Treatment Progress Update:  [x] Patient is progressing as expected towards functional goals listed. [] Progression is slowed due to complexities/Impairments listed. [] Progression has been slowed due to co-morbidities. [] Plan just implemented, too soon to assess goals progression <30days   [] Goals require adjustment due to lack of progress  [] Patient is not progressing as expected and requires additional follow up with physician  [] Other    Prognosis for POC: [x] Good [] Fair  [] Poor    Patient requires continued skilled intervention: [x] Yes  [] No      PLAN: See eval  [] Continue per plan of care [] Alter current plan (see comments)  [x] Plan of care initiated [] Hold pending MD visit [] Discharge    Electronically signed by: Nicki Ramirez, PT     Note: If patient does not return for scheduled/recommended follow up visits, this note will serve as a discharge from care along with the most recent update on progress.

## 2021-09-09 ENCOUNTER — HOSPITAL ENCOUNTER (OUTPATIENT)
Dept: PHYSICAL THERAPY | Age: 61
Setting detail: THERAPIES SERIES
Discharge: HOME | End: 2021-09-09
Payer: COMMERCIAL

## 2021-09-09 PROCEDURE — 97140 MANUAL THERAPY 1/> REGIONS: CPT

## 2021-09-09 PROCEDURE — 20560 NDL INSJ W/O NJX 1 OR 2 MUSC: CPT

## 2021-09-09 NOTE — FLOWSHEET NOTE
[address] [place] [address]  Phone: [phone] Fax: [phone]    Physical Therapy Treatment Note/ Progress Report:     Date:  2021    Patient Name:  Laurie Deutsch    :  1960  MRN: [id number]  Restrictions/Precautions:    Medical/Treatment Diagnosis Information:  · Diagnosis: R medial epidondylitis, R elbow pain m25,521  · Treatment Diagnosis: R medial epidondylitis, R elbow pain E81,716  Insurance/Certification information:     Physician Information:  Referring Practitioner: Dr Nabor Quiros of care signed (Y/N):     Date of Patient follow up with Physician:      Progress Report: []  Yes  []  No     Date Range for reporting period:  Beginnin21  Ending:       Progress report due (10 Rx/or 30 days whichever is less):       Recertification due (POC duration/ or 90 days whichever is less):       Visit # Insurance Allowable Auth Needed   4 med []Yes    []No     Pain level:  0//10     SUBJECTIVE:  Patient reports elbow is doing better, less pain at rest.  L shoulder surgery next Tuesday but might get postponed due to insurance.       OBJECTIVE: See eval   Observation:    Test measurements:      RESTRICTIONS/PRECAUTIONS: increased valgus laxity, pain with throwing, TTP over R medial epi    Exercises/Interventions:   Therapeutic Ex (33258)  Min: Sets/sec Reps CUES/Notes   UBE      Pendulum/Ball rolls      Cane AAROM flex/press      3 way Isomet      T- band Row/pinch      T- band lower pinch      T- band ER activation      Supine SA punch      SL ER/SL punch      Prone Rows/ext      Prone HAB/Prone Flex      Seat Table slides/Wall Slides      Seated HH Depression      No Money      Scap Wall Lat touches/wall walks            Standing flex/scap      Wrist flexor/ext st 30 sec 3    Lawnmower      IR/ER with band 1 15 blue   Wrist flex st at wall 20 sec  3    Ecc wrist flexion 1 15 7 lb         Manual Intervention (86701)  Min:      Shld /GH Mobs 15 min  Inf/post/IR to improve flex and abd   Wrist mobs to improve flex/ext 5min     Thoracic/Rib manipualtion      CT MT/Mobs      DN 10 min  Wrist flex, med epidcondyle   Elbow mobs 5 min Lateral glide    GISTM 0 min     NMR re-education (86990)  Min:      T-spine Ext      GH depress/compress      Scap/GH NMR      Body blade      Wall ball roll      Wall Ball bounce      Ball drops      Mari Scap Bio      Floor Snow angels-sliders            Therapeutic Activity (37780)  Min:      UE throwing porgression      Dynamic UE stability      Earthquake Bar      Bodyblade                Therapeutic Exercise and NMR EXR  [x] (60826) Provided verbal/tactile cueing for activities related to strengthening, flexibility, endurance, ROM  for improvements in scapular, scapulothoracic and UE control with self care, reaching, carrying, lifting, house/yardwork, driving/computer work. [x] (43211) Provided verbal/tactile cueing for activities related to improving balance, coordination, kinesthetic sense, posture, motor skill, proprioception  to assist with  scapular, scapulothoracic and UE control with self care, reaching, carrying, lifting, house/yardwork, driving/computer work. Therapeutic Activities:    [] (73830 or 66248) Provided verbal/tactile cueing for activities related to improving balance, coordination, kinesthetic sense, posture, motor skill, proprioception and motor activation to allow for proper function of scapular, scapulothoracic and UE control with self care, carrying, lifting, driving/computer work.      Home Exercise Program:    [x] (32889) Reviewed/Progressed HEP activities related to strengthening, flexibility, endurance, ROM of scapular, scapulothoracic and UE control with self care, reaching, carrying, lifting, house/yardwork, driving/computer work  [] (67707) Reviewed/Progressed HEP activities related to improving balance, coordination, kinesthetic sense, posture, motor skill, proprioception of scapular, scapulothoracic and UE control with self care, reaching, carrying, lifting, house/yardwork, driving/computer work      Manual Treatments:  PROM / STM / Oscillations-Mobs:  G-I, II, III, IV (PA's, Inf., Post.)  [x] (00592) Provided manual therapy to mobilize soft tissue/joints of cervical/CT, scapular GHJ and UE for the purpose of modulating pain, promoting relaxation,  increasing ROM, reducing/eliminating soft tissue swelling/inflammation/restriction, improving soft tissue extensibility and allowing for proper ROM for normal function with self care, reaching, carrying, lifting, house/yardwork, driving/computer work      Dry needling manual therapy: consisted on the placement of 3 needles in the following muscles:  wrist flexor,  Wrist extensor mm,  . A 30 mm needle was inserted, piston, rotated, and coned to produce intramuscular mobilization. These techniques were used to restore functional range of motion, reduce muscle spasm and induce healing in the corresponding musculature. (63594)  Clean Technique was utilized today while applying Dry needling treatment. The treatment sites where cleaned with 70% solution of  isopropyl alcohol . The PT washed their hands and utilized treatment gloves along with hand  prior to inserting the needles. All needles where removed and discarded in the appropriate sharps container.      Modalities:      Charges:  Timed Code Treatment Minutes: 45   Total Treatment Minutes: 45       [] EVAL (LOW) 96807 (typically 20 minutes face-to-face)  [] EVAL (MOD) 87182 (typically 30 minutes face-to-face)  [] EVAL (HIGH) 07295 (typically 45 minutes face-to-face)  [] RE-EVAL     [] VL(56412) x     [x] DRY NEEDLE 1 OR 2 MUSCLES  [] NMR (41906) x     [] DRY NEEDLE 3+ MUSCLES  [x] Manual (77944) x2      [] TA (84513) x     [] Mech Traction (80768)  [] ES(attended) (29069)     [] ES (un) (70041):   [] VASO (08061)  [] Other:    If [facility] Please Indicate Time In/Out  CPT Code Time in Time out                                   GOALS:    Patient stated goal: no pain  [] Progressing: [] Met: [] Not Met: [] Adjusted    Therapist goals for Patient:   Short Term Goals: To be achieved in: 2 weeks  1. Independent in HEP and progression per patient tolerance, in order to prevent re-injury. [] Progressing: [] Met: [] Not Met: [] Adjusted  2. Patient will have a decrease in pain to facilitate improvement in movement, function, and ADLs as indicated by Functional Deficits. [] Progressing: [] Met: [] Not Met: [] Adjusted    Long Term Goals: To be achieved in: 5 weeks  1. Disability index score of 5% or less for the Quick DASH to assist with reaching prior level of function. [] Progressing: [] Met: [] Not Met: [] Adjusted  2. Patient will demonstrate increased AROM to [facility] to allow for proper joint functioning as indicated by Functional Deficits. [] Progressing: [] Met: [] Not Met: [] Adjusted  3. Patient will demonstrate an increase in NM recruitment/activation and overall GH and scapular strength to within n5lbs HHD or WNL for proper functional mobility as indicated by patients Functional Deficits. [] Progressing: [] Met: [] Not Met: [] Adjusted  4. Patient will return to throwing with no sharp pain  activities without increased symptoms or restriction. [] Progressing: [] Met: [] Not Met: [] Adjusted  5. No pain with basketball related activity(patient specific functional goal)     [] Progressing: [] Met: [] Not Met: [] Adjusted      ASSESSMENT:  Mando well. Shoulder IR improved and flexion has improved with decreased end range symptoms. Progress as mando. Decreased overall tenderness with palpation over medial epicondyle.   Unable to load wrist due to cyst on wrist.     Return to Play: (if applicable)   []  Stage 1: Intro to Strength   []  Stage 2: Dynamic Strength and Intro to Plyometrics   []  Stage 3: Advanced Plyometrics and Intro to Throwing   []  Stage 4: Sport specific Training/Return to Sport     []  Ready to Return to Play, Meets All Above Stages   []  Not Ready for Return to Sports   Comments:      Treatment/Activity Tolerance:  [x] Patient tolerated treatment well [] Patient limited by fatique  [] Patient limited by pain  [] Patient limited by other medical complications  [] Other:     Overall Progression Towards Functional goals/ Treatment Progress Update:  [x] Patient is progressing as expected towards functional goals listed. [] Progression is slowed due to complexities/Impairments listed. [] Progression has been slowed due to co-morbidities. [] Plan just implemented, too soon to assess goals progression <30days   [] Goals require adjustment due to lack of progress  [] Patient is not progressing as expected and requires additional follow up with physician  [] Other    Prognosis for POC: [x] Good [] Fair  [] Poor    Patient requires continued skilled intervention: [x] Yes  [] No      PLAN: See eval  [] Continue per plan of care [] Alter current plan (see comments)  [x] Plan of care initiated [] Hold pending MD visit [] Discharge    Electronically signed by: Rama Dumont PT     Note: If patient does not return for scheduled/recommended follow up visits, this note will serve as a discharge from care along with the most recent update on progress.

## 2021-09-14 ENCOUNTER — APPOINTMENT (OUTPATIENT)
Dept: PHYSICAL THERAPY | Age: 61
End: 2021-09-14
Payer: COMMERCIAL

## 2021-09-16 ENCOUNTER — HOSPITAL ENCOUNTER (OUTPATIENT)
Dept: PHYSICAL THERAPY | Age: 61
Setting detail: THERAPIES SERIES
Discharge: HOME | End: 2021-09-16
Payer: COMMERCIAL

## 2021-09-16 PROCEDURE — 97016 VASOPNEUMATIC DEVICE THERAPY: CPT

## 2021-09-16 PROCEDURE — 97140 MANUAL THERAPY 1/> REGIONS: CPT

## 2021-09-16 NOTE — FLOWSHEET NOTE
[place] [address] [address]  Phone: [phone] Fax: [phone]    Physical Therapy Treatment Note/ Progress Report:     Date:  2021    Patient Name:  Krupa Murphy    :  1960  MRN: [id number]  Restrictions/Precautions:    Medical/Treatment Diagnosis Information:  · Diagnosis: R medial epidondylitis, R elbow pain [id number], L SAD, DCR  · Treatment Diagnosis: R medial epidondylitis, R elbow pain D05,911  Insurance/Certification information:     Physician Information:  Referring Practitioner: Dr Michael Loving of care signed (Y/N):     Date of Patient follow up with Physician:      Progress Report: []  Yes  []  No     Date Range for reporting period:  Beginnin21  Ending:       Progress report due (10 Rx/or 30 days whichever is less):       Recertification due (POC duration/ or 90 days whichever is less):       Visit # Insurance Allowable Auth Needed   4 med []Yes    []No     Pain level:  0/7/10     SUBJECTIVE:  Patient reports elbow is doing better, less pain at rest. Patient presents today s/p L SAD, DCE that was performed on 9/15/21. Patient reports he has been out of the sling today. Denies any numbness. Portals are covered with band aids. Mild bruising anterior shoulder/proximal bicep region. Pain ranges from 2-7/10. He was working with an owl this am and it jolted his arm which he reports caused quite a bit of pain.       OBJECTIVE:    Observation:    Test measurements:  PROM L shoulder flexion 100, ER, 40 deg, IR, 30 deg, Abd 70 deg    RESTRICTIONS/PRECAUTIONS: increased valgus laxity, pain with throwing, TTP over R medial epi    Exercises/Interventions:   Therapeutic Ex (98276)  Min: Sets/sec Reps CUES/Notes   UBE      Pendulum/Ball rolls 1 15    Cane AAROM flex/press      3 way Isomet      T- band Row/pinch      T- band lower pinch      T- band ER activation      Supine SA punch      SL ER/SL punch Prone Rows/ext      Prone HAB/Prone Flex      Seat Table slides/Wall Slides 1 15    Seated HH Depression      No Money      Scap Wall Lat touches/wall walks            Standing flex/scap      Wrist flexor/ext st 30 sec 3    Lawnmower      IR/ER with band 1 15 blue   Wrist flex st at wall 20 sec  3    Ecc wrist flexion 1 15 7 lb         Manual Intervention  (91385)  Min:      Shld /GH Mobs 15 min  PROM to L    Wrist mobs to improve flex/ext 0min     Thoracic/Rib manipualtion      CT MT/Mobs      DN 0 min  Wrist flex, med epidcondyle   Elbow mobs 0 min Lateral glide    GISTM 0 min     NMR re-education (49706)  Min:      T-spine Ext      GH depress/compress      Scap/GH NMR      Body blade      Wall ball roll      Wall Ball bounce      Ball drops      Mari Scap Bio      Floor Snow angels-sliders            Therapeutic Activity (56017)  Min:      UE throwing porgression      Dynamic UE stability      Earthquake Bar      Bodyblade                Therapeutic Exercise and NMR EXR  [x] (82414) Provided verbal/tactile cueing for activities related to strengthening, flexibility, endurance, ROM  for improvements in scapular, scapulothoracic and UE control with self care, reaching, carrying, lifting, house/yardwork, driving/computer work. [x] (57404) Provided verbal/tactile cueing for activities related to improving balance, coordination, kinesthetic sense, posture, motor skill, proprioception  to assist with  scapular, scapulothoracic and UE control with self care, reaching, carrying, lifting, house/yardwork, driving/computer work. Therapeutic Activities:    [] (98976 or 46636) Provided verbal/tactile cueing for activities related to improving balance, coordination, kinesthetic sense, posture, motor skill, proprioception and motor activation to allow for proper function of scapular, scapulothoracic and UE control with self care, carrying, lifting, driving/computer work.      Home Exercise Program:    [x] (05674) Reviewed/Progressed HEP activities related to strengthening, flexibility, endurance, ROM of scapular, scapulothoracic and UE control with self care, reaching, carrying, lifting, house/yardwork, driving/computer work  [] (72598) Reviewed/Progressed HEP activities related to improving balance, coordination, kinesthetic sense, posture, motor skill, proprioception of scapular, scapulothoracic and UE control with self care, reaching, carrying, lifting, house/yardwork, driving/computer work      Manual Treatments:  PROM / STM / Oscillations-Mobs:  G-I, II, III, IV (PA's, Inf., Post.)  [x] (61954) Provided manual therapy to mobilize soft tissue/joints of cervical/CT, scapular GHJ and UE for the purpose of modulating pain, promoting relaxation,  increasing ROM, reducing/eliminating soft tissue swelling/inflammation/restriction, improving soft tissue extensibility and allowing for proper ROM for normal function with self care, reaching, carrying, lifting, house/yardwork, driving/computer work      Dry needling manual therapy: consisted on the placement of 3 needles in the following muscles:  wrist flexor,  Wrist extensor mm,  . A 30 mm needle was inserted, piston, rotated, and coned to produce intramuscular mobilization. These techniques were used to restore functional range of motion, reduce muscle spasm and induce healing in the corresponding musculature. (05169)  Clean Technique was utilized today while applying Dry needling treatment. The treatment sites where cleaned with 70% solution of  isopropyl alcohol . The PT washed their hands and utilized treatment gloves along with hand  prior to inserting the needles. All needles where removed and discarded in the appropriate sharps container.      Modalities: Vaso due to swelling post op     Charges:  Timed Code Treatment Minutes: 30   Total Treatment Minutes: 45       [] EVAL (LOW) 43835 (typically 20 minutes face-to-face)  [] EVAL (MOD) 36463 (typically 30 minutes face-to-face)  [] EVAL (HIGH) 27231 (typically 45 minutes face-to-face)  [] RE-EVAL     [] SX(42026) x     [x] DRY NEEDLE 1 OR 2 MUSCLES  [] NMR (14071) x     [] DRY NEEDLE 3+ MUSCLES  [x] Manual (92454) x2      [] TA (52344) x     [] Mech Traction (65045)  [] ES(attended) (81686)     [] ES (un) (79387):   [x] VASO (29186)  [] Other:    If [facility] Please Indicate Time In/Out  CPT Code Time in Time out                                   GOALS:    Patient stated goal: no pain  [] Progressing: [] Met: [] Not Met: [] Adjusted    Therapist goals for Patient:   Short Term Goals: To be achieved in: 2 weeks  1. Independent in HEP and progression per patient tolerance, in order to prevent re-injury. [] Progressing: [] Met: [] Not Met: [] Adjusted  2. Patient will have a decrease in pain to facilitate improvement in movement, function, and ADLs as indicated by Functional Deficits. [] Progressing: [] Met: [] Not Met: [] Adjusted    Long Term Goals: To be achieved in: 5 weeks  1. Disability index score of 5% or less for the Quick DASH to assist with reaching prior level of function. [] Progressing: [] Met: [] Not Met: [] Adjusted  2. Patient will demonstrate increased AROM to [facility] to allow for proper joint functioning as indicated by Functional Deficits. [] Progressing: [] Met: [] Not Met: [] Adjusted  3. Patient will demonstrate an increase in NM recruitment/activation and overall GH and scapular strength to within n5lbs HHD or WNL for proper functional mobility as indicated by patients Functional Deficits. [] Progressing: [] Met: [] Not Met: [] Adjusted  4. Patient will return to throwing with no sharp pain  activities without increased symptoms or restriction. [] Progressing: [] Met: [] Not Met: [] Adjusted  5. No pain with basketball related activity(patient specific functional goal)     [] Progressing: [] Met: [] Not Met: [] Adjusted      ASSESSMENT:  Romero well. PROM appropriate for post op day 2.   Added tableslides to HEP. Instructed on post op management and general progression. Patient agreeable to plan     Return to Play: (if applicable)   []  Stage 1: Intro to Strength   []  Stage 2: Dynamic Strength and Intro to Plyometrics   []  Stage 3: Advanced Plyometrics and Intro to Throwing   []  Stage 4: Sport specific Training/Return to Sport     []  Ready to Return to Play, [employer]   []  Not Ready for Return to Sports   Comments:      Treatment/Activity Tolerance:  [x] Patient tolerated treatment well [] Patient limited by fatique  [] Patient limited by pain  [] Patient limited by other medical complications  [] Other:     Overall Progression Towards Functional goals/ Treatment Progress Update:  [x] Patient is progressing as expected towards functional goals listed. [] Progression is slowed due to complexities/Impairments listed. [] Progression has been slowed due to co-morbidities. [] Plan just implemented, too soon to assess goals progression <30days   [] Goals require adjustment due to lack of progress  [] Patient is not progressing as expected and requires additional follow up with physician  [] Other    Prognosis for POC: [x] Good [] Fair  [] Poor    Patient requires continued skilled intervention: [x] Yes  [] No      PLAN: See eval  [] Continue per plan of care [] Alter current plan (see comments)  [x] Plan of care initiated [] Hold pending MD visit [] Discharge    Electronically signed by: Shira Richards PT     Note: If patient does not return for scheduled/recommended follow up visits, this note will serve as a discharge from care along with the most recent update on progress.

## 2021-09-21 ENCOUNTER — HOSPITAL ENCOUNTER (OUTPATIENT)
Dept: PHYSICAL THERAPY | Age: 61
Setting detail: THERAPIES SERIES
Discharge: HOME | End: 2021-09-21
Payer: COMMERCIAL

## 2021-09-21 PROCEDURE — 97110 THERAPEUTIC EXERCISES: CPT

## 2021-09-21 PROCEDURE — 97140 MANUAL THERAPY 1/> REGIONS: CPT

## 2021-09-21 NOTE — FLOWSHEET NOTE
[place] [address] [address]  Phone: [phone] Fax: [phone]    Physical Therapy Treatment Note/ Progress Report:     Date:  2021    Patient Name:  Ivis Katz    :  1960  MRN: [id number]  Restrictions/Precautions:    Medical/Treatment Diagnosis Information:  · Diagnosis: R medial epidondylitis, R elbow pain [id number], L SAD, DCR  · Treatment Diagnosis: R medial epidondylitis, R elbow pain E80,397  Insurance/Certification information:     Physician Information:  Referring Practitioner: Dr Dami Black of care signed (Y/N):     Date of Patient follow up with Physician:      Progress Report: []  Yes  []  No     Date Range for reporting period:  Beginnin21  Ending:       Progress report due (10 Rx/or 30 days whichever is less):       Recertification due (POC duration/ or 90 days whichever is less):       Visit # Insurance Allowable Auth Needed   5 med []Yes    []No     Pain level:  0/7/10     SUBJECTIVE:  Patient reports having stiches taken out yesterday.   He reports shoulder is sore but continues to work on ROM as tolerated      OBJECTIVE:    Observation:   Test measurements:    RESTRICTIONS/PRECAUTIONS: increased valgus laxity, pain with throwing, TTP over R medial epi    Exercises/Interventions:   Therapeutic Ex (60593)  Min: Sets/sec Reps CUES/Notes   UBE 4 min     Pendulum/Ball rolls 1 15    Cane AAROM flex/press 1 15 As mando   3 way Isomet 8 sec 10 ER/IR   T- band Row/pinch 2 12 black   T- band lower pinch      T- band ER activation      Supine SA punch      SL ER/SL punch      Prone Rows/ext      Prone HAB/Prone Flex      Seat Table slides/Wall Slides 1 15 elevated   Seated HH Depression      No Money      Scap Wall Lat touches/wall walks            Standing flex/scap      Wrist flexor/ext st 30 sec 3    Lawnmower      IR/ER with band 1 15 blue   Wrist flex st at wall 20 sec  3    Ecc wrist flexion 1 15 7 lb         Manual Intervention  (46518)  Min:      Shld /GH Mobs 18 min  PROM to L    Wrist mobs to improve flex/ext 0min     Thoracic/Rib manipualtion      CT MT/Mobs      DN 0 min  Wrist flex, med epidcondyle   Elbow mobs 0 min Lateral glide    GISTM 0 min     NMR re-education (14043)  Min:      T-spine Ext      GH depress/compress      Scap/GH NMR      Body blade      Wall ball roll      Wall Ball bounce      Ball drops      Mari Scap Bio      Floor Snow angels-sliders            Therapeutic Activity (59297)  Min:      UE throwing porgression      Dynamic UE stability      Earthquake Bar      Bodyblade                Therapeutic Exercise and NMR EXR  [x] (45265) Provided verbal/tactile cueing for activities related to strengthening, flexibility, endurance, ROM  for improvements in scapular, scapulothoracic and UE control with self care, reaching, carrying, lifting, house/yardwork, driving/computer work. [x] (78675) Provided verbal/tactile cueing for activities related to improving balance, coordination, kinesthetic sense, posture, motor skill, proprioception  to assist with  scapular, scapulothoracic and UE control with self care, reaching, carrying, lifting, house/yardwork, driving/computer work. Therapeutic Activities:    [] (37238 or 24710) Provided verbal/tactile cueing for activities related to improving balance, coordination, kinesthetic sense, posture, motor skill, proprioception and motor activation to allow for proper function of scapular, scapulothoracic and UE control with self care, carrying, lifting, driving/computer work.      Home Exercise Program:    [x] (51319) Reviewed/Progressed HEP activities related to strengthening, flexibility, endurance, ROM of scapular, scapulothoracic and UE control with self care, reaching, carrying, lifting, house/yardwork, driving/computer work  [] (69825) Reviewed/Progressed HEP activities related to improving balance, coordination, kinesthetic sense, posture, Other:    If [facility] Please Indicate Time In/Out  CPT Code Time in Time out                                   GOALS:    Patient stated goal: no pain  [] Progressing: [] Met: [] Not Met: [] Adjusted    Therapist goals for Patient:   Short Term Goals: To be achieved in: 2 weeks  1. Independent in HEP and progression per patient tolerance, in order to prevent re-injury. [] Progressing: [] Met: [] Not Met: [] Adjusted  2. Patient will have a decrease in pain to facilitate improvement in movement, function, and ADLs as indicated by Functional Deficits. [] Progressing: [] Met: [] Not Met: [] Adjusted    Long Term Goals: To be achieved in: 5 weeks  1. Disability index score of 5% or less for the Quick DASH to assist with reaching prior level of function. [] Progressing: [] Met: [] Not Met: [] Adjusted  2. Patient will demonstrate increased AROM to [facility] to allow for proper joint functioning as indicated by Functional Deficits. [] Progressing: [] Met: [] Not Met: [] Adjusted  3. Patient will demonstrate an increase in NM recruitment/activation and overall GH and scapular strength to within n5lbs HHD or WNL for proper functional mobility as indicated by patients Functional Deficits. [] Progressing: [] Met: [] Not Met: [] Adjusted  4. Patient will return to throwing with no sharp pain  activities without increased symptoms or restriction. [] Progressing: [] Met: [] Not Met: [] Adjusted  5. No pain with basketball related activity(patient specific functional goal)     [] Progressing: [] Met: [] Not Met: [] Adjusted      ASSESSMENT:  Romero well. PROM progressing well. Bruising is more along biceps.  ER/IR improving, flexion is stiff  Return to Play: (if applicable)   []  Stage 1: Intro to Strength   []  Stage 2: Dynamic Strength and Intro to Plyometrics   []  Stage 3: Advanced Plyometrics and Intro to Throwing   []  Stage 4: Sport specific Training/Return to Sport     []  Ready to Return to Play, [employer] St[employer]   [] Not Ready for Return to Sports   Comments:      Treatment/Activity Tolerance:  [x] Patient tolerated treatment well [] Patient limited by fatique  [] Patient limited by pain  [] Patient limited by other medical complications  [] Other:     Overall Progression Towards Functional goals/ Treatment Progress Update:  [x] Patient is progressing as expected towards functional goals listed. [] Progression is slowed due to complexities/Impairments listed. [] Progression has been slowed due to co-morbidities. [] Plan just implemented, too soon to assess goals progression <30days   [] Goals require adjustment due to lack of progress  [] Patient is not progressing as expected and requires additional follow up with physician  [] Other    Prognosis for POC: [x] Good [] Fair  [] Poor    Patient requires continued skilled intervention: [x] Yes  [] No      PLAN: See eval  [] Continue per plan of care [] Alter current plan (see comments)  [x] Plan of care initiated [] Hold pending MD visit [] Discharge    Electronically signed by: Yue Palm PT     Note: If patient does not return for scheduled/recommended follow up visits, this note will serve as a discharge from care along with the most recent update on progress.

## 2021-09-23 ENCOUNTER — HOSPITAL ENCOUNTER (OUTPATIENT)
Dept: PHYSICAL THERAPY | Age: 61
Setting detail: THERAPIES SERIES
Discharge: HOME | End: 2021-09-23
Payer: COMMERCIAL

## 2021-09-23 PROCEDURE — 97140 MANUAL THERAPY 1/> REGIONS: CPT

## 2021-09-23 PROCEDURE — 97110 THERAPEUTIC EXERCISES: CPT

## 2021-09-23 NOTE — FLOWSHEET NOTE
/GH Mobs 28 min  PROM to L, inf/post glides to L GH jt    Wrist mobs to improve flex/ext 0min     Thoracic/Rib manipualtion      CT MT/Mobs      DN 0 min  Wrist flex, med epidcondyle   Elbow mobs 0 min Lateral glide    GISTM 0 min     NMR re-education (20598)  Min:      T-spine Ext      GH depress/compress      Scap/GH NMR      Body blade      Wall ball roll      Wall Ball bounce      Ball drops      Mari Scap Bio      Floor Snow angels-sliders            Therapeutic Activity (12571)  Min:      UE throwing porgression      Dynamic UE stability      Earthquake Bar      Bodyblade                Therapeutic Exercise and NMR EXR  [x] (67292) Provided verbal/tactile cueing for activities related to strengthening, flexibility, endurance, ROM  for improvements in scapular, scapulothoracic and UE control with self care, reaching, carrying, lifting, house/yardwork, driving/computer work. [x] (33174) Provided verbal/tactile cueing for activities related to improving balance, coordination, kinesthetic sense, posture, motor skill, proprioception  to assist with  scapular, scapulothoracic and UE control with self care, reaching, carrying, lifting, house/yardwork, driving/computer work. Therapeutic Activities:    [] (68882 or 79817) Provided verbal/tactile cueing for activities related to improving balance, coordination, kinesthetic sense, posture, motor skill, proprioception and motor activation to allow for proper function of scapular, scapulothoracic and UE control with self care, carrying, lifting, driving/computer work.      Home Exercise Program:    [x] (88592) Reviewed/Progressed HEP activities related to strengthening, flexibility, endurance, ROM of scapular, scapulothoracic and UE control with self care, reaching, carrying, lifting, house/yardwork, driving/computer work  [] (54807) Reviewed/Progressed HEP activities related to improving balance, coordination, kinesthetic sense, posture, motor skill, proprioception of scapular, scapulothoracic and UE control with self care, reaching, carrying, lifting, house/yardwork, driving/computer work      Manual Treatments:  PROM / STM / Oscillations-Mobs:  G-I, II, III, IV (PA's, Inf., Post.)  [x] (33492) Provided manual therapy to mobilize soft tissue/joints of cervical/CT, scapular GHJ and UE for the purpose of modulating pain, promoting relaxation,  increasing ROM, reducing/eliminating soft tissue swelling/inflammation/restriction, improving soft tissue extensibility and allowing for proper ROM for normal function with self care, reaching, carrying, lifting, house/yardwork, driving/computer work      Dry needling manual therapy: consisted on the placement of 3 needles in the following muscles:  wrist flexor,  Wrist extensor mm,  . A 30 mm needle was inserted, piston, rotated, and coned to produce intramuscular mobilization. These techniques were used to restore functional range of motion, reduce muscle spasm and induce healing in the corresponding musculature. (43973)  Clean Technique was utilized today while applying Dry needling treatment. The treatment sites where cleaned with 70% solution of  isopropyl alcohol . The PT washed their hands and utilized treatment gloves along with hand  prior to inserting the needles. All needles where removed and discarded in the appropriate sharps container.      Modalities: Vaso due to swelling post op     Charges:  Timed Code Treatment Minutes: 40   Total Treatment Minutes: 45       [] EVAL (LOW) 48786 (typically 20 minutes face-to-face)  [] EVAL (MOD) 20625 (typically 30 minutes face-to-face)  [] EVAL (HIGH) 88822 (typically 45 minutes face-to-face)  [] RE-EVAL     [x] OS(85275) x1     [] DRY NEEDLE 1 OR 2 MUSCLES  [] NMR (97019) x     [] DRY NEEDLE 3+ MUSCLES  [x] Manual (84315) x2      [] TA (01740) x     [] Mech Traction (77990)  [] ES(attended) (69376)     [] ES (un) (37492):   [] VASO (08908)  [] Other:    If [facility] Please Indicate Time In/Out  CPT Code Time in Time out                                   GOALS:    Patient stated goal: no pain  [] Progressing: [] Met: [] Not Met: [] Adjusted    Therapist goals for Patient:   Short Term Goals: To be achieved in: 2 weeks  1. Independent in HEP and progression per patient tolerance, in order to prevent re-injury. [] Progressing: [] Met: [] Not Met: [] Adjusted  2. Patient will have a decrease in pain to facilitate improvement in movement, function, and ADLs as indicated by Functional Deficits. [] Progressing: [] Met: [] Not Met: [] Adjusted    Long Term Goals: To be achieved in: 5 weeks  1. Disability index score of 5% or less for the Quick DASH to assist with reaching prior level of function. [] Progressing: [] Met: [] Not Met: [] Adjusted  2. Patient will demonstrate [facility]OM to [facility] to allow for proper joint functioning as indicated by Functional Deficits. [] Progressing: [] Met: [] Not Met: [] Adjusted  3. Patient will demonstrate an increase in NM recruitment/activation and overall GH and scapular strength to within n5lbs HHD or WNL for proper functional mobility as indicated by patients Functional Deficits. [] Progressing: [] Met: [] Not Met: [] Adjusted  4. Patient will return to throwing with no sharp pain  activities without increased symptoms or restriction. [] Progressing: [] Met: [] Not Met: [] Adjusted  5. No pain with basketball related activity(patient specific functional goal)     [] Progressing: [] Met: [] Not Met: [] Adjusted      ASSESSMENT:  Romero well. PROM progressing well. Bruising is more along biceps. ER/IR improving, flexion is stiff but improved after MT.       Return to Play: (if applicable)   []  Stage 1: Intro to Strength   []  Stage 2: Dynamic Strength and Intro to Plyometrics   []  Stage 3: Advanced Plyometrics and Intro to Throwing   []  Stage 4: Sport specific Training/Return to Sport     []  Ready to Return to Play, [place] [employer] Stages   []  Not Ready for Return to Sports   Comments:      Treatment/Activity Tolerance:  [x] Patient tolerated treatment well [] Patient limited by fatique  [] Patient limited by pain  [] Patient limited by other medical complications  [] Other:     Overall Progression Towards Functional goals/ Treatment Progress Update:  [x] Patient is progressing as expected towards functional goals listed. [] Progression is slowed due to complexities/Impairments listed. [] Progression has been slowed due to co-morbidities. [] Plan just implemented, too soon to assess goals progression <30days   [] Goals require adjustment due to lack of progress  [] Patient is not progressing as expected and requires additional follow up with physician  [] Other    Prognosis for POC: [x] Good [] Fair  [] Poor    Patient requires continued skilled intervention: [x] Yes  [] No      PLAN: See eval  [] Continue per plan of care [] Alter current plan (see comments)  [x] Plan of care initiated [] Hold pending MD visit [] Discharge    Electronically signed by: Davida Medrano PT     Note: If patient does not return for scheduled/recommended follow up visits, this note will serve as a discharge from care along with the most recent update on progress.

## 2021-09-30 ENCOUNTER — HOSPITAL ENCOUNTER (OUTPATIENT)
Dept: PHYSICAL THERAPY | Age: 61
Setting detail: THERAPIES SERIES
Discharge: HOME | End: 2021-09-30
Payer: COMMERCIAL

## 2021-09-30 PROCEDURE — 97110 THERAPEUTIC EXERCISES: CPT

## 2021-09-30 PROCEDURE — 97140 MANUAL THERAPY 1/> REGIONS: CPT

## 2021-09-30 NOTE — FLOWSHEET NOTE
[address] [place] [address] [place] [address]  Phone: [phone] Fax: [phone]    Physical Therapy Treatment Note/ Progress Report:     Date:  2021    Patient Name:  Angelika Sullivan    :  1960  MRN: [id number]  Restrictions/Precautions:    Medical/Treatment Diagnosis Information:  · Diagnosis: R medial epidondylitis, R elbow pain m25,521, L SAD, DCR  · Treatment Diagnosis: R medial epidondylitis, R elbow pain K58,325  Insurance/Certification information:     Physician Information:  Referring Practitioner: Dr Jacobo Perez of care signed (Y/N):     Date of Patient follow up with Physician:      Progress Report: []  Yes  [x]  No     Date Range for reporting period:  Beginnin21  Ending:       Progress report due (10 Rx/or 30 days whichever is less):       Recertification due (POC duration/ or 90 days whichever is less):       Visit # Insurance Allowable Auth Needed   7 med []Yes    []No     Pain level:  0/7/10     SUBJECTIVE:  Patient reports that 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 Feeling a lot of soreness through the L shoulder. OBJECTIVE:  Omitted R elbow exr due to increased L shoulder soreness; treatment spent on the L side today.    Observation:     RESTRICTIONS/PRECAUTIONS: increased valgus laxity, pain with throwing, TTP over R medial epi    Exercises/Interventions:   Therapeutic Ex (38835)  Min: Sets/sec Reps CUES/Notes   UBE 4 min     Pendulum/Ball rolls    Cane AAROM flex/press 1 15 As mando   3 way Isomet 8 sec 10 ER/IR, reviewed form to keep posture upright and shoulder blades pinched   T- band Row/pinch 2 12 blue   T- band lower pinch 2 12 Blue, cues for form   T- band ER activation      Supine SA punch      SL ER/SL punch      Prone Rows/ext      Prone HAB/Prone Flex      Wall Slides 1 15    Seated HH Depression      No Money Scap Wall Lat touches/wall walks            Standing flex/scap      Wrist flexor/ext st 30 sec 3    Lawnmower      IR/ER with band blue   Wrist flex st at wall    Ecc wrist flexion 7 lb         Manual Intervention  (87485)  Min:      Shld /GH Mobs 20 min  PROM to L, inf/post glides to L GH jt    Wrist mobs to improve flex/ext 0min     Thoracic/Rib manipualtion      CT MT/Mobs      DN 0 min  Wrist flex, med epidcondyle   Elbow mobs 0 min Lateral glide    GISTM 6 min  Proximal humerus, deltoid   NMR re-education (64182)  Min:      T-spine Ext      GH depress/compress      Scap/GH NMR      Body blade      Wall ball roll      Wall Ball bounce      Ball drops      Mari Scap Bio      Floor Snow angels-sliders            Therapeutic Activity (28362)  Min:      UE throwing porgression      Dynamic UE stability      Earthquake Bar      Bodyblade                Therapeutic Exercise and NMR EXR  [x] (95426) Provided verbal/tactile cueing for activities related to strengthening, flexibility, endurance, ROM  for improvements in scapular, scapulothoracic and UE control with self care, reaching, carrying, lifting, house/yardwork, driving/computer work. [x] (31248) Provided verbal/tactile cueing for activities related to improving balance, coordination, kinesthetic sense, posture, motor skill, proprioception  to assist with  scapular, scapulothoracic and UE control with self care, reaching, carrying, lifting, house/yardwork, driving/computer work. Therapeutic Activities:    [] (97473 or 63632) Provided verbal/tactile cueing for activities related to improving balance, coordination, kinesthetic sense, posture, motor skill, proprioception and motor activation to allow for proper function of scapular, scapulothoracic and UE control with self care, carrying, lifting, driving/computer work.      Home Exercise Program:    [x] (01006) Reviewed/Progressed HEP activities related to strengthening, flexibility, endurance, ROM of scapular, scapulothoracic and UE control with self care, reaching, carrying, lifting, house/yardwork, driving/computer work  [] (78549) Reviewed/Progressed HEP activities related to improving balance, coordination, kinesthetic sense, posture, motor skill, proprioception of scapular, scapulothoracic and UE control with self care, reaching, carrying, lifting, house/yardwork, driving/computer work      Manual Treatments:  PROM / STM / Oscillations-Mobs:  G-I, II, III, IV (PA's, Inf., Post.)  [x] (63121) Provided manual therapy to mobilize soft tissue/joints of cervical/CT, scapular GHJ and UE for the purpose of modulating pain, promoting relaxation,  increasing ROM, reducing/eliminating soft tissue swelling/inflammation/restriction, improving soft tissue extensibility and allowing for proper ROM for normal function with self care, reaching, carrying, lifting, house/yardwork, driving/computer work          Charges:  Timed Code Treatment Minutes: 40   Total Treatment Minutes: 40       [] EVAL (LOW) 07665 (typically 20 minutes face-to-face)  [] EVAL (MOD) 36572 (typically 30 minutes face-to-face)  [] EVAL (HIGH) 26867 (typically 45 minutes face-to-face)  [] RE-EVAL     [x] UT(51375) x1     [] DRY NEEDLE 1 OR 2 MUSCLES  [] NMR (37328) x     [] DRY NEEDLE 3+ MUSCLES  [x] Manual ([id number]) x2      [] TA (97360) x     [] Mech Traction (00620)  [] ES(attended) (77106)     [] ES (un) (44092):   [] VASO (93741)  [] Other:    If BW Please Indicate Time In/Out  CPT Code Time in Time out                                   GOALS:    Patient stated goal: no pain  [] Progressing: [] Met: [] Not Met: [] Adjusted    Therapist goals for Patient:   Short Term Goals: To be achieved in: 2 weeks  1. Independent in HEP and progression per patient tolerance, in order to prevent re-injury. [] Progressing: [] Met: [] Not Met: [] Adjusted  2.  Patient will have a decrease in pain to facilitate improvement in movement, function, and ADLs as indicated by Functional Deficits. [] Progressing: [] Met: [] Not Met: [] Adjusted    Long Term Goals: To be achieved in: 5 weeks  1. Disability index score of 5% or less for the Quick DASH to assist with reaching prior level of function. [] Progressing: [] Met: [] Not Met: [] Adjusted  2. Patient will demonstrate increased AROM to [facility] to allow for proper joint functioning as indicated by Functional Deficits. [] Progressing: [] Met: [] Not Met: [] Adjusted  3. Patient will demonstrate an increase in NM recruitment/activation and overall GH and scapular strength to within n5lbs HHD or WNL for proper functional mobility as indicated by patients Functional Deficits. [] Progressing: [] Met: [] Not Met: [] Adjusted  4. Patient will return to throwing with no sharp pain  activities without increased symptoms or restriction. [] Progressing: [] Met: [] Not Met: [] Adjusted  5. No pain with basketball related activity(patient specific functional goal)     [] Progressing: [] Met: [] Not Met: [] Adjusted      ASSESSMENT:  Romero well. PROM progressing well. Bruising is more along biceps. ER/IR improving, flexion is stiff but improved after MT. Overall more sore today secondary to duration of time spent reaching overhead today. Return to Play: (if applicable)   []  Stage 1: Intro to Strength   []  Stage 2: Dynamic Strength and Intro to Plyometrics   []  Stage 3: Advanced Plyometrics and Intro to Throwing   []  Stage 4: Sport specific Training/Return to Sport     []  Ready to Return to Play, [employer]   []  Not Ready for Return to Sports   Comments:      Treatment/Activity Tolerance:  [x] Patient tolerated treatment well [] Patient limited by fatique  [] Patient limited by pain  [] Patient limited by other medical complications  [] Other:     Overall Progression Towards Functional goals/ Treatment Progress Update:  [x] Patient is progressing as expected towards functional goals listed.     [] Progression is slowed due to complexities/Impairments listed. [] Progression has been slowed due to co-morbidities. [] Plan just implemented, too soon to assess goals progression <30days   [] Goals require adjustment due to lack of progress  [] Patient is not progressing as expected and requires additional follow up with physician  [] Other    Prognosis for POC: [x] Good [] Fair  [] Poor    Patient requires continued skilled intervention: [x] Yes  [] No      PLAN: See eval  [x] Continue per plan of care [] Alter current plan (see comments)  [] Plan of care initiated [] Hold pending MD visit [] Discharge    Electronically signed by: Alok Luevano PTA     Note: If patient does not return for scheduled/recommended follow up visits, this note will serve as a discharge from care along with the most recent update on progress.

## 2021-10-05 ENCOUNTER — HOSPITAL ENCOUNTER (OUTPATIENT)
Dept: PHYSICAL THERAPY | Age: 61
Setting detail: THERAPIES SERIES
Discharge: HOME | End: 2021-10-05
Payer: COMMERCIAL

## 2021-10-05 PROCEDURE — 97140 MANUAL THERAPY 1/> REGIONS: CPT

## 2021-10-05 PROCEDURE — 97110 THERAPEUTIC EXERCISES: CPT

## 2021-10-05 NOTE — FLOWSHEET NOTE
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     Electronically signed by:  Cherylene Canner, PTA, [id number]

## 2021-10-05 NOTE — FLOWSHEET NOTE
[facility] [address] [facility]  Phone: [phone] Fax: [phone]    Physical Therapy Treatment Note/ Progress Report:     Date:  10/5/2021    Patient Name:  Carrington Varela    :  1960  MRN: [id number]  Restrictions/Precautions:    Medical/Treatment Diagnosis Information:  · Diagnosis: R medial epidondylitis, R elbow pain m25,521, L SAD, DCR  · Treatment Diagnosis: R medial epidondylitis, R elbow pain L94,625  Insurance/Certification information:     Physician Information:  Referring Practitioner: Dr Merry Loza of care signed (Y/N):     Date of Patient follow up with Physician:      Progress Report: []  Yes  [x]  No     Date Range for reporting period:  Beginnin21  Ending:       Progress report due (10 Rx/or 30 days whichever is less):       Recertification due (POC duration/ or 90 days whichever is less):       Visit # Insurance Allowable Auth Needed   8 med []Yes    []No     Pain level:  0/7/10     SUBJECTIVE:  Pt arrived at wrong appointment time today, but PTA had an opening. Patient reports compliance c HEP 2x/day. 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 Pt states that he started taking his anti-inflammatory medication again, which seems to be helping with the soreness a little. OBJECTIVE:  Omitted R elbow exr due to increased L shoulder soreness; treatment spent on the L side today. Applied kinesiotape to L upper arm to help with bruising.     Observation:     RESTRICTIONS/PRECAUTIONS: increased valgus laxity, pain with throwing, TTP over R medial epi    Exercises/Interventions:   Therapeutic Ex (19905)  Min: Sets/sec Reps CUES/Notes   UBE 4 min     Pendulum/Ball rolls    Cane AAROM flex/press 1 15 As mando   3 way Isomet 8 sec 10 ER/IR, reviewed form to keep posture upright and shoulder blades pinched   T- band Row/pinch blue   T- band lower pinch 5sec 20 green, cues for form   T- band ER activation      Supine SA punch      SL ER/SL punch      Prone Rows/ext      Prone HAB/Prone Flex      Wall Slides 1 15          Seated HH Depression      No Money 5sec 20 Red tube attached to wall   Scap Wall Lat touches/wall walks            Standing flex/scap      Wrist flexor/ext st    Lawnmower      IR/ER with band blue   Wrist flex st at wall    Ecc wrist flexion 7 lb         Manual Intervention  (28580)  Min:      Shld /GH Mobs 20 min  PROM to L, inf/post glides to L GH jt    Wrist mobs to improve flex/ext 0min     Thoracic/Rib manipualtion      CT MT/Mobs      DN 0 min  Wrist flex, med epidcondyle   Elbow mobs 0 min Lateral glide    GISTM 6 min  Proximal humerus, deltoid   NMR re-education (91862)  Min:      T-spine Ext      GH depress/compress      Scap/GH NMR      Body blade      Wall ball roll      Wall Ball bounce      Ball drops      Mari Scap Bio      Floor Snow angels-sliders            Therapeutic Activity (22184)  Min:      UE throwing porgression      Dynamic UE stability      Earthquake Bar      Bodyblade                Therapeutic Exercise and NMR EXR  [x] (52594) Provided verbal/tactile cueing for activities related to strengthening, flexibility, endurance, ROM  for improvements in scapular, scapulothoracic and UE control with self care, reaching, carrying, lifting, house/yardwork, driving/computer work. [x] (70493) Provided verbal/tactile cueing for activities related to improving balance, coordination, kinesthetic sense, posture, motor skill, proprioception  to assist with  scapular, scapulothoracic and UE control with self care, reaching, carrying, lifting, house/yardwork, driving/computer work.     Therapeutic Activities:    [] (28728 or 22636) Provided verbal/tactile cueing for activities related to improving balance, coordination, kinesthetic sense, posture, motor skill, proprioception and motor activation to allow for proper function of scapular, scapulothoracic and UE control with self care, carrying, lifting, driving/computer work.      Home Exercise Program:    [x] (64804) Reviewed/Progressed HEP activities related to strengthening, flexibility, endurance, ROM of scapular, scapulothoracic and UE control with self care, reaching, carrying, lifting, house/yardwork, driving/computer work  [] (54541) Reviewed/Progressed HEP activities related to improving balance, coordination, kinesthetic sense, posture, motor skill, proprioception of scapular, scapulothoracic and UE control with self care, reaching, carrying, lifting, house/yardwork, driving/computer work      Manual Treatments:  PROM / STM / Oscillations-Mobs:  G-I, II, III, IV (PA's, Inf., Post.)  [x] (26067) Provided manual therapy to mobilize soft tissue/joints of cervical/CT, scapular GHJ and UE for the purpose of modulating pain, promoting relaxation,  increasing ROM, reducing/eliminating soft tissue swelling/inflammation/restriction, improving soft tissue extensibility and allowing for proper ROM for normal function with self care, reaching, carrying, lifting, house/yardwork, driving/computer work          Charges:  Timed Code Treatment Minutes: 40   Total Treatment Minutes: 40       [] EVAL (LOW) 10218 (typically 20 minutes face-to-face)  [] EVAL (MOD) 28766 (typically 30 minutes face-to-face)  [] EVAL (HIGH) 08043 (typically 45 minutes face-to-face)  [] RE-EVAL     [x] RM(13942) x1     [] DRY NEEDLE 1 OR 2 MUSCLES  [] NMR (40192) x     [] DRY NEEDLE 3+ MUSCLES  [x] Manual ([id number]) x2      [] TA (54369) x     [] Mech Traction (78826)  [] ES(attended) (24721)     [] ES (un) (80064):   [] VASO (34228)  [] Other:    If BWC Please Indicate Time In/Out  CPT Code Time in Time out                                   GOALS:    Patient stated goal: no pain  [] Progressing: [] Met: [] Not Met: [] Adjusted    Therapist goals for Patient: Short Term Goals: To be achieved in: 2 weeks  1. Independent in HEP and progression per patient tolerance, in order to prevent re-injury. [] Progressing: [] Met: [] Not Met: [] Adjusted  2. Patient will have a decrease in pain to facilitate improvement in movement, function, and ADLs as indicated by Functional Deficits. [] Progressing: [] Met: [] Not Met: [] Adjusted    Long Term Goals: To be achieved in: 5 weeks  1. Disability index score of 5% or less for the Quick DASH to assist with reaching prior level of function. [] Progressing: [] Met: [] Not Met: [] Adjusted  2. Patient will demonstrate increased AROM to [facility] to allow for proper joint functioning as indicated by Functional Deficits. [] Progressing: [] Met: [] Not Met: [] Adjusted  3. Patient will demonstrate an increase in NM recruitment/activation and overall GH and scapular strength to within n5lbs HHD or WNL for proper functional mobility as indicated by patients Functional Deficits. [] Progressing: [] Met: [] Not Met: [] Adjusted  4. Patient will return to throwing with no sharp pain  activities without increased symptoms or restriction. [] Progressing: [] Met: [] Not Met: [] Adjusted  5. No pain with basketball related activity(patient specific functional goal)     [] Progressing: [] Met: [] Not Met: [] Adjusted      ASSESSMENT:  Romero well, though pain level not significantly changed. PROM progressing gradually. Bruising is more along biceps. ER/IR improving, flexion is stiff but improved after MT. Overall sore today secondary to duration of time spent reaching overhead.       Return to Play: (if applicable)   []  Stage 1: Intro to Strength   []  Stage 2: Dynamic Strength and Intro to Plyometrics   []  Stage 3: Advanced Plyometrics and Intro to Throwing   []  Stage 4: Sport specific Training/Return to Sport     []  Ready to Return to Play, Meets [employer] Stages   []  Not Ready for Return to Sports   Comments:      Treatment/Activity Tolerance:  [x] Patient tolerated treatment well [] Patient limited by fatique  [] Patient limited by pain  [] Patient limited by other medical complications  [] Other:     Overall Progression Towards Functional goals/ Treatment Progress Update:  [x] Patient is progressing as expected towards functional goals listed. [] Progression is slowed due to complexities/Impairments listed. [] Progression has been slowed due to co-morbidities. [] Plan just implemented, too soon to assess goals progression <30days   [] Goals require adjustment due to lack of progress  [] Patient is not progressing as expected and requires additional follow up with physician  [] Other    Prognosis for POC: [x] Good [] Fair  [] Poor    Patient requires continued skilled intervention: [x] Yes  [] No      PLAN: See eval  [x] Continue per plan of care [] Alter current plan (see comments)  [] Plan of care initiated [] Hold pending MD visit [] Discharge    Electronically signed by: Alia Khan PTA     Note: If patient does not return for scheduled/recommended follow up visits, this note will serve as a discharge from care along with the most recent update on progress.

## 2021-10-07 ENCOUNTER — HOSPITAL ENCOUNTER (OUTPATIENT)
Dept: PHYSICAL THERAPY | Age: 61
Setting detail: THERAPIES SERIES
Discharge: HOME | End: 2021-10-07
Payer: COMMERCIAL

## 2021-10-07 PROCEDURE — 97110 THERAPEUTIC EXERCISES: CPT

## 2021-10-07 PROCEDURE — 97140 MANUAL THERAPY 1/> REGIONS: CPT

## 2021-10-12 ENCOUNTER — HOSPITAL ENCOUNTER (OUTPATIENT)
Dept: PHYSICAL THERAPY | Age: 61
Setting detail: THERAPIES SERIES
Discharge: HOME | End: 2021-10-12
Payer: COMMERCIAL

## 2021-10-12 PROCEDURE — 97140 MANUAL THERAPY 1/> REGIONS: CPT

## 2021-10-12 PROCEDURE — 97110 THERAPEUTIC EXERCISES: CPT

## 2021-10-12 NOTE — FLOWSHEET NOTE
[address] [place], [place] [address]  Phone: [phone] Fax: [phone]    Physical Therapy Treatment Note/ Progress Report:     Date:  10/12/2021    Patient Name:  Aki Chavira    :  1960  MRN: [id number]  Restrictions/Precautions:    Medical/Treatment Diagnosis Information:  · Diagnosis: R medial epidondylitis, R elbow pain m25,521, L SAD, DCR  · Treatment Diagnosis: R medial epidondylitis, R elbow pain W46,357  Insurance/Certification information:     Physician Information:  Referring Practitioner: Dr Karie Vance of care signed (Y/N):     Date of Patient follow up with Physician:      Progress Report: []  Yes  [x]  No     Date Range for reporting period:  Beginnin21  Ending:       Progress report due (10 Rx/or 30 days whichever is less):       Recertification due (POC duration/ or 90 days whichever is less):       Visit # Insurance Allowable Auth Needed   10 med []Yes    []No     Pain level:  0/7/10     SUBJECTIVE:  Pt reporting shoulder is sore from banding,  Motion is improving slowly, IR still limited.     OBJECTIVE:      Observation:     RESTRICTIONS/PRECAUTIONS: increased valgus laxity, pain with throwing, TTP over R medial epi    Exercises/Interventions:   Therapeutic Ex (09599)  Min: 10 Sets/sec Reps CUES/Notes   UBE 4 min     Pendulum/Ball rolls    Cane AAROM flex/press 2 10 As mando   T- band ER activation scaption 3 8  red   Standing HAB 2 12 green   SL ER/SL punch      Prone Rows/ext      Prone HAB/Prone Flex      Wall Slides 0           Seated HH Depression      No Money 0  Red tube attached to wall   Scap Wall Lat touches/wall walks      Standing ext st 10 sec 5 On table   Standing flex/scap      Wrist flexor/ext st    Lawnmower      IR/ER with band blue   Wrist flex st at wall    Ecc wrist flexion 7 lb         Manual Intervention  (02522)  Min: 29      Shld /GH Mobs 22 min  PROM to L, inf/post glides to L [address] [facility] jt    Wrist mobs to improve flex/ext 0min     Post capsule stretch 3 min  Positioning without OP   CT MT/Mobs      DN 0 min  Wrist flex, med epidcondyle   Elbow mobs 0 min Lateral glide    GISTM 0 min  Proximal humerus, deltoid   NMR re-education (81876)  Min:      T-spine Ext      GH depress/compress      Scap/GH NMR      Body blade      Wall ball roll      Wall Ball bounce      Ball drops      Mari Scap Bio      Floor Snow angels-sliders            Therapeutic Activity (68128)  Min:      UE throwing porgression      Dynamic UE stability      Earthquake Bar      Bodyblade                Therapeutic Exercise and NMR EXR  [x] (65546) Provided verbal/tactile cueing for activities related to strengthening, flexibility, endurance, ROM  for improvements in scapular, scapulothoracic and UE control with self care, reaching, carrying, lifting, house/yardwork, driving/computer work. [x] (97232) Provided verbal/tactile cueing for activities related to improving balance, coordination, kinesthetic sense, posture, motor skill, proprioception  to assist with  scapular, scapulothoracic and UE control with self care, reaching, carrying, lifting, house/yardwork, driving/computer work. Therapeutic Activities:    [] (00155 or 30851) Provided verbal/tactile cueing for activities related to improving balance, coordination, kinesthetic sense, posture, motor skill, proprioception and motor activation to allow for proper function of scapular, scapulothoracic and UE control with self care, carrying, lifting, driving/computer work.      Home Exercise Program:    [x] (14790) Reviewed/Progressed HEP activities related to strengthening, flexibility, endurance, ROM of scapular, scapulothoracic and UE control with self care, reaching, carrying, lifting, house/yardwork, driving/computer work  [] (03648) Reviewed/Progressed HEP activities related to improving balance, coordination, kinesthetic sense, posture, motor skill, proprioception of scapular, scapulothoracic and UE control with self care, reaching, carrying, lifting, house/yardwork, driving/computer work      Manual Treatments:  PROM / STM / Oscillations-Mobs:  G-I, II, III, IV (PA's, Inf., Post.)  [x] (19378) Provided manual therapy to mobilize soft tissue/joints of cervical/CT, scapular GHJ and UE for the purpose of modulating pain, promoting relaxation,  increasing ROM, reducing/eliminating soft tissue swelling/inflammation/restriction, improving soft tissue extensibility and allowing for proper ROM for normal function with self care, reaching, carrying, lifting, house/yardwork, driving/computer work          Charges:  Timed Code Treatment Minutes: 39   Total Treatment Minutes: 40       [] EVAL (LOW) 46222 (typically 20 minutes face-to-face)  [] EVAL (MOD) 04678 (typically 30 minutes face-to-face)  [] EVAL (HIGH) 21683 (typically 45 minutes face-to-face)  [] RE-EVAL     [x] PQ(73170) x1     [] DRY NEEDLE 1 OR 2 MUSCLES  [] NMR (50552) x     [] DRY NEEDLE 3+ MUSCLES  [x] Manual (69241) x2      [] TA (21047) x     [] Mech Traction (50938)  [] ES(attended) (70899)     [] ES (un) (64876):   [] VASO (14528)  [] Other:    If BWC Please Indicate Time In/Out  CPT Code Time in Time out                                   GOALS:    Patient stated goal: no pain  [] Progressing: [] Met: [] Not Met: [] Adjusted    Therapist goals for Patient:   Short Term Goals: To be achieved in: 2 weeks  1. Independent in HEP and progression per patient tolerance, in order to prevent re-injury. [] Progressing: [] Met: [] Not Met: [] Adjusted  2. Patient will have a decrease in pain to facilitate improvement in movement, function, and ADLs as indicated by Functional Deficits. [] Progressing: [] Met: [] Not Met: [] Adjusted    Long Term Goals: To be achieved in: 5 weeks  1. Disability index score of 5% or less for the Quick DASH to assist with reaching prior level of function.    [] Progressing: [] Met: [] Not Met: [] Adjusted  2. Patient will demonstrate increased AROM to [facility] to allow for proper joint functioning as indicated by Functional Deficits. [] Progressing: [] Met: [] Not Met: [] Adjusted  3. Patient will demonstrate an increase in NM recruitment/activation and overall GH and scapular strength to within n5lbs HHD or WNL for proper functional mobility as indicated by patients Functional Deficits. [] Progressing: [] Met: [] Not Met: [] Adjusted  4. Patient will return to throwing with no sharp pain  activities without increased symptoms or restriction. [] Progressing: [] Met: [] Not Met: [] Adjusted  5. No pain with basketball related activity(patient specific functional goal)     [] Progressing: [] Met: [] Not Met: [] Adjusted      ASSESSMENT:  Patient tolerated mobilization of shoulder joint well with improved OH elevation compared to when he arrived today. IR improved with belt stretch, Motion continues to improve slowly    Return to Play: (if applicable)   []  Stage 1: Intro to Strength   []  Stage 2: Dynamic Strength and Intro to Plyometrics   []  Stage 3: Advanced Plyometrics and Intro to Throwing   []  Stage 4: Sport specific Training/Return to Sport     []  Ready to Return to Play, [employer]   []  Not Ready for Return to Sports   Comments:      Treatment/Activity Tolerance:  [x] Patient tolerated treatment well [] Patient limited by fatique  [] Patient limited by pain  [] Patient limited by other medical complications  [] Other:     Overall Progression Towards Functional goals/ Treatment Progress Update:  [x] Patient is progressing as expected towards functional goals listed. [] Progression is slowed due to complexities/Impairments listed. [] Progression has been slowed due to co-morbidities.   [] Plan just implemented, too soon to assess goals progression <30days   [] Goals require adjustment due to lack of progress  [] Patient is not progressing as expected and requires additional follow up with physician  [] Other    Prognosis for POC: [x] Good [] Fair  [] Poor    Patient requires continued skilled intervention: [x] Yes  [] No      PLAN: See eval  [x] Continue per plan of care [] Alter current plan (see comments)  [] Plan of care initiated [] Hold pending MD visit [] Discharge    Electronically signed by: Colby Hendrix PT     Note: If patient does not return for scheduled/recommended follow up visits, this note will serve as a discharge from care along with the most recent update on progress.

## 2021-10-14 ENCOUNTER — APPOINTMENT (OUTPATIENT)
Dept: PHYSICAL THERAPY | Age: 61
End: 2021-10-14
Payer: COMMERCIAL

## 2021-10-14 ENCOUNTER — HOSPITAL ENCOUNTER (OUTPATIENT)
Dept: PHYSICAL THERAPY | Age: 61
Setting detail: THERAPIES SERIES
Discharge: HOME | End: 2021-10-14
Payer: COMMERCIAL

## 2021-10-14 PROCEDURE — 97140 MANUAL THERAPY 1/> REGIONS: CPT

## 2021-10-14 PROCEDURE — 97110 THERAPEUTIC EXERCISES: CPT

## 2021-10-14 NOTE — FLOWSHEET NOTE
[address] [place], [place] [address] Fuller  Phone: [phone] Fax: [phone]    Physical Therapy Treatment Note/ Progress Report:     Date:  10/14/2021    Patient Name:  Krishna Davis    :  1960  MRN: [id number]  Restrictions/Precautions:    Medical/Treatment Diagnosis Information:  · Diagnosis: R medial epidondylitis, R elbow pain m25,521, L SAD, DCR  · Treatment Diagnosis: R medial epidondylitis, R elbow pain H12,480  Insurance/Certification information:     Physician Information:  Referring Practitioner: Dr Dorothy Hay of care signed (Y/N):     Date of Patient follow up with Physician:      Progress Report: []  Yes  [x]  No     Date Range for reporting period:  Beginnin21  Ending:       Progress report due (10 Rx/or 30 days whichever is less):       Recertification due (POC duration/ or 90 days whichever is less):       Visit # Insurance Allowable Auth Needed   11 med []Yes    []No     Pain level:  0/7/10     SUBJECTIVE:  Pt reporting shoulder is sore from banding,  Sore from the week  OBJECTIVE:      Observation:     RESTRICTIONS/PRECAUTIONS: increased valgus laxity, pain with throwing, TTP over R medial epi    Exercises/Interventions:   Therapeutic Ex (13978)  Min: 10 Sets/sec Reps CUES/Notes   UBE 4 min     Pendulum/Ball rolls    Cane AAROM flex/press 2 10 As mando   T- band ER activation scaption 3 8  red   Standing HAB 2 12 green   SL ER/SL punch      Prone Rows/ext      Prone HAB/Prone Flex      Wall Slides 0           Seated HH Depression      No Money 0  Red tube attached to wall   Scap Wall Lat touches/wall walks      Standing ext st 10 sec 5 On table   Standing flex/scap      Wrist flexor/ext st    Lawnmower      IR/ER with band blue   Wrist flex st at wall    Ecc wrist flexion 7 lb         Manual Intervention  (59984)  Min: 29      Shld /GH Mobs 22 min  PROM to L, inf/post glides to L GH jt    Wrist mobs to improve flex/ext 0min     Post capsule stretch 3 min  Positioning without OP   CT MT/Mobs      DN 0 min  Wrist flex, med epidcondyle   Elbow mobs 0 min Lateral glide    GISTM 8 min  Proximal humerus, deltoid   NMR re-education (21196)  Min:      T-spine Ext      GH depress/compress      Scap/GH NMR      Body blade      Wall ball roll      Wall Ball bounce      Ball drops      Mari Scap Bio      Floor Snow angels-sliders            Therapeutic Activity (67194)  Min:      UE throwing porgression      Dynamic UE stability      Earthquake Bar      Bodyblade                Therapeutic Exercise and NMR EXR  [x] (81896) Provided verbal/tactile cueing for activities related to strengthening, flexibility, endurance, ROM  for improvements in scapular, scapulothoracic and UE control with self care, reaching, carrying, lifting, house/yardwork, driving/computer work. [x] (19912) Provided verbal/tactile cueing for activities related to improving balance, coordination, kinesthetic sense, posture, motor skill, proprioception  to assist with  scapular, scapulothoracic and UE control with self care, reaching, carrying, lifting, house/yardwork, driving/computer work. Therapeutic Activities:    [] (03691 or 76051) Provided verbal/tactile cueing for activities related to improving balance, coordination, kinesthetic sense, posture, motor skill, proprioception and motor activation to allow for proper function of scapular, scapulothoracic and UE control with self care, carrying, lifting, driving/computer work.      Home Exercise Program:    [x] (23449) Reviewed/Progressed HEP activities related to strengthening, flexibility, endurance, ROM of scapular, scapulothoracic and UE control with self care, reaching, carrying, lifting, house/yardwork, driving/computer work  [] (05223) Reviewed/Progressed HEP activities related to improving balance, coordination, kinesthetic sense, posture, motor skill, proprioception of scapular, scapulothoracic and UE control with self care, reaching, carrying, lifting, house/yardwork, driving/computer work      Manual Treatments:  PROM / STM / Oscillations-Mobs:  G-I, II, III, IV (Luh, Inf., Post.)  [x] (11652) Provided manual therapy to mobilize soft tissue/joints of cervical/CT, scapular GHJ and UE for the purpose of modulating pain, promoting relaxation,  increasing ROM, reducing/eliminating soft tissue swelling/inflammation/restriction, improving soft tissue extensibility and allowing for proper ROM for normal function with self care, reaching, carrying, lifting, house/yardwork, driving/computer work          Charges:  Timed Code Treatment Minutes: 39   Total Treatment Minutes: 40       [] EVAL (LOW) 80567 (typically 20 minutes face-to-face)  [] EVAL (MOD) 49415 (typically 30 minutes face-to-face)  [] EVAL (HIGH) 91510 (typically 45 minutes face-to-face)  [] RE-EVAL     [x] KK(71398) x1     [] DRY NEEDLE 1 OR 2 MUSCLES  [] NMR (89833) x     [] DRY NEEDLE 3+ MUSCLES  [x] Manual (03647) x2      [] TA (64641) x     [] Mech Traction (01323)  [] ES(attended) (93258)     [] ES (un) (82429):   [] VASO (36885)  [] Other:    If BWC Please Indicate Time In/Out  CPT Code Time in Time out                                   GOALS:    Patient stated goal: no pain  [] Progressing: [] Met: [] Not Met: [] Adjusted    Therapist goals for Patient:   Short Term Goals: To be achieved in: 2 weeks  1. Independent in HEP and progression per patient tolerance, in order to prevent re-injury. [] Progressing: [] Met: [] Not Met: [] Adjusted  2. Patient will have a decrease in pain to facilitate improvement in movement, function, and ADLs as indicated by Functional Deficits. [] Progressing: [] Met: [] Not Met: [] Adjusted    Long Term Goals: To be achieved in: 5 weeks  1. Disability index score of 5% or less for the Quick DASH to assist with reaching prior level of function. [] Progressing: [] Met: [] Not Met: [] Adjusted  2.  Patient will demonstrate increased AROM to [facility] to allow for proper joint functioning as indicated by Functional Deficits. [] Progressing: [] Met: [] Not Met: [] Adjusted  3. Patient will demonstrate an increase in NM recruitment/activation and overall GH and scapular strength to within n5lbs HHD or WNL for proper functional mobility as indicated by patients Functional Deficits. [] Progressing: [] Met: [] Not Met: [] Adjusted  4. Patient will return to throwing with no sharp pain  activities without increased symptoms or restriction. [] Progressing: [] Met: [] Not Met: [] Adjusted  5. No pain with basketball related activity(patient specific functional goal)     [] Progressing: [] Met: [] Not Met: [] Adjusted      ASSESSMENT:  Patient tolerated mobilization of shoulder joint well with improved OH elevation compared to when he arrived today. GISTM decreased lateral shoulder pain with OH motion  Return to Play: (if applicable)   []  Stage 1: Intro to Strength   []  Stage 2: Dynamic Strength and Intro to Plyometrics   []  Stage 3: Advanced Plyometrics and Intro to Throwing   []  Stage 4: Sport specific Training/Return to Sport     []  Ready to Return to Play, [employer]   []  Not Ready for Return to Sports   Comments:      Treatment/Activity Tolerance:  [x] Patient tolerated treatment well [] Patient limited by fatique  [] Patient limited by pain  [] Patient limited by other medical complications  [] Other:     Overall Progression Towards Functional goals/ Treatment Progress Update:  [x] Patient is progressing as expected towards functional goals listed. [] Progression is slowed due to complexities/Impairments listed. [] Progression has been slowed due to co-morbidities.   [] Plan just implemented, too soon to assess goals progression <30days   [] Goals require adjustment due to lack of progress  [] Patient is not progressing as expected and requires additional follow up with physician  [] Other    Prognosis for POC: [x] Good [] Fair  [] Poor    Patient requires continued skilled intervention: [x] Yes  [] No      PLAN:   [x] Continue per plan of care [] Alter current plan (see comments)  [] Plan of care initiated [] Hold pending MD visit [] Discharge    Electronically signed by: Tamar Storey PT     Note: If patient does not return for scheduled/recommended follow up visits, this note will serve as a discharge from care along with the most recent update on progress.

## 2021-10-19 ENCOUNTER — HOSPITAL ENCOUNTER (OUTPATIENT)
Dept: PHYSICAL THERAPY | Age: 61
Setting detail: THERAPIES SERIES
Discharge: HOME | End: 2021-10-19
Payer: COMMERCIAL

## 2021-10-19 PROCEDURE — 97110 THERAPEUTIC EXERCISES: CPT

## 2021-10-19 PROCEDURE — 97140 MANUAL THERAPY 1/> REGIONS: CPT

## 2021-10-19 NOTE — FLOWSHEET NOTE
[address] [place], [place] [address] [address]  Phone: [phone] Fax: [phone]    Physical Therapy Treatment Note/ Progress Report:     Date:  10/19/2021    Patient Name:  Carine Parada    :  1960  MRN: [id number]  Restrictions/Precautions:    Medical/Treatment Diagnosis Information:  · Diagnosis: R medial epidondylitis, R elbow pain m25,521, L SAD, DCR  · Treatment Diagnosis: R medial epidondylitis, R elbow pain W18,302  Insurance/Certification information:     Physician Information:  Referring Practitioner: Dr Vlad Rodriguez of care signed (Y/N):     Date of Patient follow up with Physician:      Progress Report: []  Yes  [x]  No     Date Range for reporting period:  Beginnin21  Ending:       Progress report due (10 Rx/or 30 days whichever is less):       Recertification due (POC duration/ or 90 days whichever is less):       Visit # Insurance Allowable Auth Needed   12 med []Yes    []No     Pain level:  0/10     SUBJECTIVE:  Pt reporting shoulder is sore from banding,  Sore from doing mini push ups at home.   Reaching behind back is still the most limiting factor  OBJECTIVE:      Observation:     RESTRICTIONS/PRECAUTIONS: increased valgus laxity, pain with throwing, TTP over R medial epi    Exercises/Interventions:   Therapeutic Ex (25906)  Min: 10 Sets/sec Reps CUES/Notes   UBE 4 min     Pendulum/Ball rolls    Cane AAROM flex/press 2 10 As mando   T- band ER activation scaption 3 8  red   Standing HAB 2 12 green   SL ER/SL punch      Prone Rows/ext      Prone HAB/Prone Flex      Wall Slides 0           Seated HH Depression      No Money 0  Red tube attached to wall   Scap Wall Lat touches/wall walks      Standing ext st 10 sec 5 On table   Standing flex/scap      Wrist flexor/ext st    Lawnmower      IR/ER with band blue   Wrist flex st at wall    Ecc wrist flexion 7 lb         Manual Intervention  (13605)  Min: 27      Shld /GH Mobs 22 min  PROM to L, inf/post glides to L GH jt    Wrist mobs to improve flex/ext 0min     Post capsule stretch 5 min  Positioning without OP   CT MT/Mobs      DN 0 min  Wrist flex, med epidcondyle   Elbow mobs 0 min Lateral glide    GISTM 0min  Proximal humerus, deltoid   NMR re-education (66102)  Min:      T-spine Ext      GH depress/compress      Scap/GH NMR      Body blade      Wall ball roll      Wall Ball bounce      Ball drops      Mari Scap Bio      Floor Snow angels-sliders            Therapeutic Activity (56694)  Min:      UE throwing porgression      Dynamic UE stability      Earthquake Bar      Bodyblade                Therapeutic Exercise and NMR EXR  [x] (04350) Provided verbal/tactile cueing for activities related to strengthening, flexibility, endurance, ROM  for improvements in scapular, scapulothoracic and UE control with self care, reaching, carrying, lifting, house/yardwork, driving/computer work. [x] (65486) Provided verbal/tactile cueing for activities related to improving balance, coordination, kinesthetic sense, posture, motor skill, proprioception  to assist with  scapular, scapulothoracic and UE control with self care, reaching, carrying, lifting, house/yardwork, driving/computer work. Therapeutic Activities:    [] (04271 or 12847) Provided verbal/tactile cueing for activities related to improving balance, coordination, kinesthetic sense, posture, motor skill, proprioception and motor activation to allow for proper function of scapular, scapulothoracic and UE control with self care, carrying, lifting, driving/computer work.      Home Exercise Program:    [x] (48518) Reviewed/Progressed HEP activities related to strengthening, flexibility, endurance, ROM of scapular, scapulothoracic and UE control with self care, reaching, carrying, lifting, house/yardwork, driving/computer work  [] (24080) Reviewed/Progressed HEP activities related to improving balance, coordination, kinesthetic sense, posture, motor skill, proprioception of scapular, scapulothoracic and UE control with self care, reaching, carrying, lifting, house/yardwork, driving/computer work      Manual Treatments:  PROM / STM / Oscillations-Mobs:  G-I, II, III, IV (PA's, Inf., Post.)  [x] (96723) Provided manual therapy to mobilize soft tissue/joints of cervical/CT, scapular GHJ and UE for the purpose of modulating pain, promoting relaxation,  increasing ROM, reducing/eliminating soft tissue swelling/inflammation/restriction, improving soft tissue extensibility and allowing for proper ROM for normal function with self care, reaching, carrying, lifting, house/yardwork, driving/computer work          Charges:  Timed Code Treatment Minutes: 45   Total Treatment Minutes: 45       [] EVAL (LOW) 87990 (typically 20 minutes face-to-face)  [] EVAL (MOD) 58859 (typically 30 minutes face-to-face)  [] EVAL (HIGH) 51126 (typically 45 minutes face-to-face)  [] RE-EVAL     [x] TE(67690) x1     [] DRY NEEDLE 1 OR 2 MUSCLES  [] NMR (48859) x     [] DRY NEEDLE 3+ MUSCLES  [x] Manual (76903) x2      [] TA (45023) x     [] Mech Traction (73385)  [] ES(attended) (17180)     [] ES (un) (71277):   [] VASO (40511)  [] Other:    If [facility] Please Indicate Time In/Out  CPT Code Time in Time out                                   GOALS:    Patient stated goal: no pain  [] Progressing: [] Met: [] Not Met: [] Adjusted    Therapist goals for Patient:   Short Term Goals: To be achieved in: 2 weeks  1. Independent in HEP and progression per patient tolerance, in order to prevent re-injury. [] Progressing: [] Met: [] Not Met: [] Adjusted  2. Patient will have a decrease in pain to facilitate improvement in movement, function, and ADLs as indicated by Functional Deficits. [] Progressing: [] Met: [] Not Met: [] Adjusted    Long Term Goals: To be achieved in: 5 weeks  1.  Disability index score of 5% or less for the Quick DASH to assist with reaching prior level of function. [] Progressing: [] Met: [] Not Met: [] Adjusted  2. Patient will demonstrate increased AROM to [facility] to allow for proper joint functioning as indicated by Functional Deficits. [] Progressing: [] Met: [] Not Met: [] Adjusted  3. Patient will demonstrate an increase in NM recruitment/activation and overall GH and scapular strength to within n5lbs HHD or WNL for proper functional mobility as indicated by patients Functional Deficits. [] Progressing: [] Met: [] Not Met: [] Adjusted  4. Patient will return to throwing with no sharp pain  activities without increased symptoms or restriction. [] Progressing: [] Met: [] Not Met: [] Adjusted  5. No pain with basketball related activity(patient specific functional goal)     [] Progressing: [] Met: [] Not Met: [] Adjusted      ASSESSMENT:  Patient tolerated mobilization of shoulder joint well with improved OH elevation compared to when he arrived today. Shoulder ext st had an immediate impact on HBB. Instructed to perform at home. ROM slowly increasing. Return to Play: (if applicable)   []  Stage 1: Intro to Strength   []  Stage 2: Dynamic Strength and Intro to Plyometrics   []  Stage 3: Advanced Plyometrics and Intro to Throwing   []  Stage 4: Sport specific Training/Return to Sport     []  Ready to Return to Play, [employer]   []  Not Ready for Return to Sports   Comments:      Treatment/Activity Tolerance:  [x] Patient tolerated treatment well [] Patient limited by fatique  [] Patient limited by pain  [] Patient limited by other medical complications  [] Other:     Overall Progression Towards Functional goals/ Treatment Progress Update:  [x] Patient is progressing as expected towards functional goals listed. [] Progression is slowed due to complexities/Impairments listed. [] Progression has been slowed due to co-morbidities.   [] Plan just implemented, too soon to assess goals progression <30days   [] Goals require adjustment due to lack of progress  [] Patient is not progressing as expected and requires additional follow up with physician  [] Other    Prognosis for POC: [x] Good [] Fair  [] Poor    Patient requires continued skilled intervention: [x] Yes  [] No      PLAN:   [x] Continue per plan of care [] Alter current plan (see comments)  [] Plan of care initiated [] Hold pending MD visit [] Discharge    Electronically signed by: Refugoi Barnes, PT     Note: If patient does not return for scheduled/recommended follow up visits, this note will serve as a discharge from care along with the most recent update on progress.

## 2021-10-21 ENCOUNTER — HOSPITAL ENCOUNTER (OUTPATIENT)
Dept: PHYSICAL THERAPY | Age: 61
Setting detail: THERAPIES SERIES
Discharge: HOME | End: 2021-10-21
Payer: COMMERCIAL

## 2021-10-21 PROCEDURE — 97110 THERAPEUTIC EXERCISES: CPT

## 2021-10-21 PROCEDURE — 97140 MANUAL THERAPY 1/> REGIONS: CPT

## 2021-10-21 NOTE — FLOWSHEET NOTE
[place] [address] [facility] [address]  Phone: [phone] Fax: [phone]    Physical Therapy Treatment Note/ Progress Report:     Date:  10/21/2021    Patient Name:  Raul Sky    :  1960  MRN: [id number]  Restrictions/Precautions:    Medical/Treatment Diagnosis Information:  · Diagnosis: R medial epidondylitis, R elbow pain m25,521, L SAD, DCR  · Treatment Diagnosis: R medial epidondylitis, R elbow pain T33,402  Insurance/Certification information:     Physician Information:  Referring Practitioner: Dr Mary Jo Hernández of care signed (Y/N):     Date of Patient follow up with Physician:      Progress Report: []  Yes  [x]  No     Date Range for reporting period:  Beginnin21  Ending:       Progress report due (10 Rx/or 30 days whichever is less):       Recertification due (POC duration/ or 90 days whichever is less):       Visit # Insurance Allowable Auth Needed   13 med []Yes    []No     Pain level:  0//10     SUBJECTIVE:  Pt reporting is sore by this time of the week.   Bandingis almost over and will be able to rest his shoulder    OBJECTIVE:      Observation:     RESTRICTIONS/PRECAUTIONS: increased valgus laxity, pain with throwing, TTP over R medial epi    Exercises/Interventions:   Therapeutic Ex (10448)  Min: 10 Sets/sec Reps CUES/Notes   UBE 4 min     Pendulum/Ball rolls    Cane AAROM flex/press 2 10 As mando   T- band ER activation scaption 3 8  red   Standing HAB 2 12 green   SL ER/SL punch      Prone Rows/ext      Prone HAB/Prone Flex      Wall Slides 0           Seated HH Depression      No Money 0  Red tube attached to wall   Scap Wall Lat touches/wall walks      Standing ext st 10 sec 5 On table   Standing flex/scap      Wrist flexor/ext st    Lawnmower      IR/ER with band blue   Wrist flex st at wall    Ecc wrist flexion 7 lb         Manual Intervention  (00692)  Min: 35      Shld /GH Mobs 22 min  PROM to L, inf/post glides to L GH jt    Wrist mobs to improve flex/ext 0min     Post capsule stretch 5 min  Positioning without OP   CT MT/Mobs      DN 0 min  Wrist flex, med epidcondyle   Elbow mobs 0 min Lateral glide    GISTM 8min  Proximal humerus, deltoid   NMR re-education (87594)  Min:      T-spine Ext      GH depress/compress      Scap/GH NMR      Body blade      Wall ball roll      Wall Ball bounce      Ball drops      Mari Scap Bio      Floor Snow angels-sliders            Therapeutic Activity (17875)  Min:      UE throwing porgression      Dynamic UE stability      Earthquake Bar      Bodyblade                Therapeutic Exercise and NMR EXR  [x] (73469) Provided verbal/tactile cueing for activities related to strengthening, flexibility, endurance, ROM  for improvements in scapular, scapulothoracic and UE control with self care, reaching, carrying, lifting, house/yardwork, driving/computer work. [x] (21116) Provided verbal/tactile cueing for activities related to improving balance, coordination, kinesthetic sense, posture, motor skill, proprioception  to assist with  scapular, scapulothoracic and UE control with self care, reaching, carrying, lifting, house/yardwork, driving/computer work. Therapeutic Activities:    [] (66634 or 51534) Provided verbal/tactile cueing for activities related to improving balance, coordination, kinesthetic sense, posture, motor skill, proprioception and motor activation to allow for proper function of scapular, scapulothoracic and UE control with self care, carrying, lifting, driving/computer work.      Home Exercise Program:    [x] (28922) Reviewed/Progressed HEP activities related to strengthening, flexibility, endurance, ROM of scapular, scapulothoracic and UE control with self care, reaching, carrying, lifting, house/yardwork, driving/computer work  [] (56322) Reviewed/Progressed HEP activities related to improving balance, coordination, kinesthetic sense, posture, motor [] Met: [] Not Met: [] Adjusted  2. Patient will demonstrate increased AROM to [facility] to allow for proper joint functioning as indicated by Functional Deficits. [] Progressing: [] Met: [] Not Met: [] Adjusted  3. Patient will demonstrate an increase in NM recruitment/activation and overall GH and scapular strength to within n5lbs HHD or WNL for proper functional mobility as indicated by patients Functional Deficits. [] Progressing: [] Met: [] Not Met: [] Adjusted  4. Patient will return to throwing with no sharp pain  activities without increased symptoms or restriction. [] Progressing: [] Met: [] Not Met: [] Adjusted  5. No pain with basketball related activity(patient specific functional goal)     [] Progressing: [] Met: [] Not Met: [] Adjusted      ASSESSMENT:  Patient tolerated well. Focused more on ROM and STM due to generalized soreness. Hor add improving  Cont as mando     Return to Play: (if applicable)   []  Stage 1: Intro to Strength   []  Stage 2: Dynamic Strength and Intro to Plyometrics   []  Stage 3: Advanced Plyometrics and Intro to Throwing   []  Stage 4: Sport specific Training/Return to Sport     []  Ready to Return to Play, [employer]   []  Not Ready for Return to Sports   Comments:      Treatment/Activity Tolerance:  [x] Patient tolerated treatment well [] Patient limited by fatique  [] Patient limited by pain  [] Patient limited by other medical complications  [] Other:     Overall Progression Towards Functional goals/ Treatment Progress Update:  [x] Patient is progressing as expected towards functional goals listed. [] Progression is slowed due to complexities/Impairments listed. [] Progression has been slowed due to co-morbidities.   [] Plan just implemented, too soon to assess goals progression <30days   [] Goals require adjustment due to lack of progress  [] Patient is not progressing as expected and requires additional follow up with physician  [] Other    Prognosis for POC: [x] Good [] Fair  [] Poor    Patient requires continued skilled intervention: [x] Yes  [] No      PLAN:   [x] Continue per plan of care [] Alter current plan (see comments)  [] Plan of care initiated [] Hold pending MD visit [] Discharge    Electronically signed by: Nicho Green PT     Note: If patient does not return for scheduled/recommended follow up visits, this note will serve as a discharge from care along with the most recent update on progress.

## 2021-10-26 ENCOUNTER — HOSPITAL ENCOUNTER (OUTPATIENT)
Dept: PHYSICAL THERAPY | Age: 61
Setting detail: THERAPIES SERIES
Discharge: HOME | End: 2021-10-26
Payer: COMMERCIAL

## 2021-10-26 PROCEDURE — 97110 THERAPEUTIC EXERCISES: CPT

## 2021-10-26 PROCEDURE — 97140 MANUAL THERAPY 1/> REGIONS: CPT

## 2021-10-26 NOTE — FLOWSHEET NOTE
[place] [address] [address] [place]  Phone: [phone] Fax: [phone]    Physical Therapy Treatment Note/ Progress Report:     Date:  10/26/2021    Patient Name:  Francisca Kelly    :  1960  MRN: [id number]  Restrictions/Precautions:    Medical/Treatment Diagnosis Information:  · Diagnosis: R medial epidondylitis, R elbow pain m25,521, L SAD, DCR  · Treatment Diagnosis: R medial epidondylitis, R elbow pain Y53,317  Insurance/Certification information:     Physician Information:  Referring Practitioner: Dr Delilah Ibarra of care signed (Y/N):     Date of Patient follow up with Physician:      Progress Report: []  Yes  [x]  No     Date Range for reporting period:  Beginnin21  Ending:       Progress report due (10 Rx/or 30 days whichever is less):       Recertification due (POC duration/ or 90 days whichever is less):       Visit # Insurance Allowable Auth Needed   14 med []Yes    []No     Pain level:  0/7/10     SUBJECTIVE:  Pt reports he played basketball for approx 45 min this weekend. Sore but sore all over.       OBJECTIVE:      Observation:     RESTRICTIONS/PRECAUTIONS: increased valgus laxity, pain with throwing, TTP over R medial epi    Exercises/Interventions:   Therapeutic Ex (33185)  Min: 10 Sets/sec Reps CUES/Notes   UBE 4 min     Pendulum/Ball rolls    Cane AAROM flex/press 2 10 As mando   T- band ER activation scaption 3 8  red   Standing HAB 2 12 green   SL ER/SL punch      Prone Rows/ext      Prone HAB/Prone Flex      Wall Slides 0           Seated HH Depression      No Money 0  Red tube attached to wall   Scap Wall Lat touches/wall walks      Standing ext st 10 sec 5 On table   Standing flex/scap      Wrist flexor/ext st    Lawnmower      IR/ER with band blue   Wrist flex st at wall    Ecc wrist flexion 7 lb         Manual Intervention  (37783)  Min: 35      Shld /GH Mobs 22 min  PROM to L, inf/post glides to L [facility] jt    Wrist mobs to improve flex/ext 0min     Post capsule stretch 5 min  Positioning without OP   CT MT/Mobs      DN 0 min  Wrist flex, med epidcondyle   Elbow mobs 0 min Lateral glide    GISTM 8min  Proximal humerus, deltoid   NMR re-education (78049)  Min:      T-spine Ext      GH depress/compress      Scap/GH NMR      Body blade      Wall ball roll      Wall Ball bounce      Ball drops      Mari Scap Bio      Floor Snow angels-sliders            Therapeutic Activity (98976)  Min:      UE throwing porgression      Dynamic UE stability      Earthquake Bar      Bodyblade                Therapeutic Exercise and NMR EXR  [x] (86371) Provided verbal/tactile cueing for activities related to strengthening, flexibility, endurance, ROM  for improvements in scapular, scapulothoracic and UE control with self care, reaching, carrying, lifting, house/yardwork, driving/computer work. [x] (11254) Provided verbal/tactile cueing for activities related to improving balance, coordination, kinesthetic sense, posture, motor skill, proprioception  to assist with  scapular, scapulothoracic and UE control with self care, reaching, carrying, lifting, house/yardwork, driving/computer work. Therapeutic Activities:    [] (59600 or 52105) Provided verbal/tactile cueing for activities related to improving balance, coordination, kinesthetic sense, posture, motor skill, proprioception and motor activation to allow for proper function of scapular, scapulothoracic and UE control with self care, carrying, lifting, driving/computer work.      Home Exercise Program:    [x] (06491) Reviewed/Progressed HEP activities related to strengthening, flexibility, endurance, ROM of scapular, scapulothoracic and UE control with self care, reaching, carrying, lifting, house/yardwork, driving/computer work  [] (62495) Reviewed/Progressed HEP activities related to improving balance, coordination, kinesthetic sense, posture, motor skill, proprioception of scapular, scapulothoracic and UE control with self care, reaching, carrying, lifting, house/yardwork, driving/computer work      Manual Treatments:  PROM / STM / Oscillations-Mobs:  G-I, II, III, IV (PA's, Inf., Post.)  [x] (78785) Provided manual therapy to mobilize soft tissue/joints of cervical/CT, scapular GHJ and UE for the purpose of modulating pain, promoting relaxation,  increasing ROM, reducing/eliminating soft tissue swelling/inflammation/restriction, improving soft tissue extensibility and allowing for proper ROM for normal function with self care, reaching, carrying, lifting, house/yardwork, driving/computer work          Charges:  Timed Code Treatment Minutes: 40   Total Treatment Minutes: 40       [] EVAL (LOW) 67907 (typically 20 minutes face-to-face)  [] EVAL (MOD) 12678 (typically 30 minutes face-to-face)  [] EVAL (HIGH) 74421 (typically 45 minutes face-to-face)  [] RE-EVAL     [x] TC(60234) x1     [] DRY NEEDLE 1 OR 2 MUSCLES  [] NMR (77614) x     [] DRY NEEDLE 3+ MUSCLES  [x] Manual (11814) x2      [] TA (95491) x     [] Mech Traction (32276)  [] ES(attended) (29901)     [] ES (un) (10466):   [] VASO (71770)  [] Other:    If [facility] Please Indicate Time In/Out  CPT Code Time in Time out                                   GOALS:    Patient stated goal: no pain  [] Progressing: [] Met: [] Not Met: [] Adjusted    Therapist goals for Patient:   Short Term Goals: To be achieved in: 2 weeks  1. Independent in HEP and progression per patient tolerance, in order to prevent re-injury. [] Progressing: [] Met: [] Not Met: [] Adjusted  2. Patient will have a decrease in pain to facilitate improvement in movement, function, and ADLs as indicated by Functional Deficits. [] Progressing: [] Met: [] Not Met: [] Adjusted    Long Term Goals: To be achieved in: 5 weeks  1. Disability index score of 5% or less for the Quick DASH to assist with reaching prior level of function.    [] Progressing: [] Met: [] Not Met: [] Adjusted  2. Patient will demonstrate increased AROM to [facility] to allow for proper joint functioning as indicated by Functional Deficits. [] Progressing: [] Met: [] Not Met: [] Adjusted  3. Patient will demonstrate an increase in NM recruitment/activation and overall GH and scapular strength to within n5lbs HHD or WNL for proper functional mobility as indicated by patients Functional Deficits. [] Progressing: [] Met: [] Not Met: [] Adjusted  4. Patient will return to throwing with no sharp pain  activities without increased symptoms or restriction. [] Progressing: [] Met: [] Not Met: [] Adjusted  5. No pain with basketball related activity(patient specific functional goal)     [] Progressing: [] Met: [] Not Met: [] Adjusted      ASSESSMENT:  Patient tolerated well. Focused on ROM specifically flexion,  Improved with mild end range symptoms but doing well. Progress to strengthening NV    Return to Play: (if applicable)   []  Stage 1: Intro to Strength   []  Stage 2: Dynamic Strength and Intro to Plyometrics   []  Stage 3: Advanced Plyometrics and Intro to Throwing   []  Stage 4: Sport specific Training/Return to Sport     []  Ready to Return to Play, [employer]   []  Not Ready for Return to Sports   Comments:      Treatment/Activity Tolerance:  [x] Patient tolerated treatment well [] Patient limited by fatique  [] Patient limited by pain  [] Patient limited by other medical complications  [] Other:     Overall Progression Towards Functional goals/ Treatment Progress Update:  [x] Patient is progressing as expected towards functional goals listed. [] Progression is slowed due to complexities/Impairments listed. [] Progression has been slowed due to co-morbidities.   [] Plan just implemented, too soon to assess goals progression <30days   [] Goals require adjustment due to lack of progress  [] Patient is not progressing as expected and requires additional follow up with physician  [] Other    Prognosis for POC: [x] Good [] Fair  [] Poor    Patient requires continued skilled intervention: [x] Yes  [] No      PLAN:   [x] Continue per plan of care [] Alter current plan (see comments)  [] Plan of care initiated [] Hold pending MD visit [] Discharge    Electronically signed by: Cortney Suh PT     Note: If patient does not return for scheduled/recommended follow up visits, this note will serve as a discharge from care along with the most recent update on progress.

## 2021-10-28 ENCOUNTER — HOSPITAL ENCOUNTER (OUTPATIENT)
Dept: PHYSICAL THERAPY | Age: 61
Setting detail: THERAPIES SERIES
Discharge: HOME | End: 2021-10-28
Payer: COMMERCIAL

## 2021-10-28 PROCEDURE — 97110 THERAPEUTIC EXERCISES: CPT

## 2021-10-28 PROCEDURE — 97140 MANUAL THERAPY 1/> REGIONS: CPT

## 2021-10-28 NOTE — FLOWSHEET NOTE
[facility] [address] [place] [address]  Phone: [phone] Fax: [phone]    Physical Therapy Treatment Note/ Progress Report:     Date:  10/28/2021    Patient Name:  Jackie Dye    :  1960  MRN: [id number]  Restrictions/Precautions:    Medical/Treatment Diagnosis Information:  · Diagnosis: R medial epidondylitis, R elbow pain m25,521, L SAD, DCR  · Treatment Diagnosis: R medial epidondylitis, R elbow pain P55,270  Insurance/Certification information:     Physician Information:  Referring Practitioner: Dr Olman Simental of care signed (Y/N):     Date of Patient follow up with Physician:      Progress Report: []  Yes  [x]  No     Date Range for reporting period:  Beginnin21  Ending:       Progress report due (10 Rx/or 30 days whichever is less):       Recertification due (POC duration/ or 90 days whichever is less):       Visit # Insurance Allowable Auth Needed   15 med []Yes    []No     Pain level:  0/7/10     SUBJECTIVE:  Pt reports his shoulder is sore due to it being at the end of the week.   ROM is slowly improving but still sore at end range flexion,   HBB still tight      OBJECTIVE:      Observation:     RESTRICTIONS/PRECAUTIONS: increased valgus laxity, pain with throwing, TTP over R medial epi    Exercises/Interventions:   Therapeutic Ex (97361)  Min: 10 Sets/sec Reps CUES/Notes   UBE 4 min     Pendulum/Ball rolls    Cane AAROM flex/press 2 10 As mando   T- band ER activation scaption 3 8  red   Standing HAB 2 12 green   SL ER/SL punch      Prone Rows/ext      Prone HAB/Prone Flex      Wall Slides 0           Seated HH Depression      No Money 0  Red tube attached to wall   Scap Wall Lat touches/wall walks      Standing ext st 10 sec 5 On table   Standing flex/scap      Wrist flexor/ext st    Lawnmower      IR/ER with band blue   Wrist flex st at wall    Ecc wrist flexion 7 lb   Sleeper st 15 sec 5    Manual Intervention  ([id number])  Min: 35      Shld /GH Mobs 23 min  PROM to L, inf/post glides to L GH jt    Wrist mobs to improve flex/ext 0min     Post capsule stretch 5 min  Positioning without OP   CT MT/Mobs      DN 0 min  Wrist flex, med epidcondyle   Elbow mobs 0 min Lateral glide    GISTM 8min  Proximal humerus, deltoid   NMR re-education (51743)  Min:      T-spine Ext      GH depress/compress      Scap/GH NMR      Body blade      Wall ball roll      Wall Ball bounce      Ball drops      Mari Scap Bio      Floor Snow angels-sliders            Therapeutic Activity (37874)  Min:      UE throwing porgression      Dynamic UE stability      Earthquake Bar      Bodyblade                Therapeutic Exercise and NMR EXR  [x] (53402) Provided verbal/tactile cueing for activities related to strengthening, flexibility, endurance, ROM  for improvements in scapular, scapulothoracic and UE control with self care, reaching, carrying, lifting, house/yardwork, driving/computer work. [x] (40957) Provided verbal/tactile cueing for activities related to improving balance, coordination, kinesthetic sense, posture, motor skill, proprioception  to assist with  scapular, scapulothoracic and UE control with self care, reaching, carrying, lifting, house/yardwork, driving/computer work. Therapeutic Activities:    [] (06127 or 24231) Provided verbal/tactile cueing for activities related to improving balance, coordination, kinesthetic sense, posture, motor skill, proprioception and motor activation to allow for proper function of scapular, scapulothoracic and UE control with self care, carrying, lifting, driving/computer work.      Home Exercise Program:    [x] (26304) Reviewed/Progressed HEP activities related to strengthening, flexibility, endurance, ROM of scapular, scapulothoracic and UE control with self care, reaching, carrying, lifting, house/yardwork, driving/computer work  [] (49401) Reviewed/Progressed HEP activities related to improving balance, coordination, kinesthetic sense, posture, motor skill, proprioception of scapular, scapulothoracic and UE control with self care, reaching, carrying, lifting, house/yardwork, driving/computer work      Manual Treatments:  PROM / STM / Oscillations-Mobs:  G-I, II, III, IV (PA's, Inf., Post.)  [x] (48205) Provided manual therapy to mobilize soft tissue/joints of cervical/CT, scapular GHJ and UE for the purpose of modulating pain, promoting relaxation,  increasing ROM, reducing/eliminating soft tissue swelling/inflammation/restriction, improving soft tissue extensibility and allowing for proper ROM for normal function with self care, reaching, carrying, lifting, house/yardwork, driving/computer work          Charges:  Timed Code Treatment Minutes: 40   Total Treatment Minutes: 40       [] EVAL (LOW) 01287 (typically 20 minutes face-to-face)  [] EVAL (MOD) 12989 (typically 30 minutes face-to-face)  [] EVAL (HIGH) 88239 (typically 45 minutes face-to-face)  [] RE-EVAL     [x] UD(62628) x1     [] DRY NEEDLE 1 OR 2 MUSCLES  [] NMR (28525) x     [] DRY NEEDLE 3+ MUSCLES  [x] Manual ([id number]) x2      [] TA (46037) x     [] Mech Traction (65498)  [] ES(attended) (95881)     [] ES (un) (92692):   [] VASO (32084)  [] Other:    If [facility] Please Indicate Time In/Out  CPT Code Time in Time out                                   GOALS:    Patient stated goal: no pain  [] Progressing: [] Met: [] Not Met: [] Adjusted    Therapist goals for Patient:   Short Term Goals: To be achieved in: 2 weeks  1. Independent in HEP and progression per patient tolerance, in order to prevent re-injury. [] Progressing: [] Met: [] Not Met: [] Adjusted  2. Patient will have a decrease in pain to facilitate improvement in movement, function, and ADLs as indicated by Functional Deficits. [] Progressing: [] Met: [] Not Met: [] Adjusted    Long Term Goals: To be achieved in: 5 weeks  1.  Disability index score of 5% or less for the Quick DASH to assist with reaching prior level of function. [] Progressing: [] Met: [] Not Met: [] Adjusted  2. Patient will demonstrate increased [facility] to allow for proper joint functioning as indicated by Functional Deficits. [] Progressing: [] Met: [] Not Met: [] Adjusted  3. Patient will demonstrate an increase in NM recruitment/activation and overall GH and scapular strength to within n5lbs HHD or WNL for proper functional mobility as indicated by patients Functional Deficits. [] Progressing: [] Met: [] Not Met: [] Adjusted  4. Patient will return to throwing with no sharp pain  activities without increased symptoms or restriction. [] Progressing: [] Met: [] Not Met: [] Adjusted  5. No pain with basketball related activity(patient specific functional goal)     [] Progressing: [] Met: [] Not Met: [] Adjusted      ASSESSMENT:  Patient tolerated well. Has just worked out for an hour so focused on mobility and IR ROM. Good within session change of HBB after sleeper st.      Return to Play: (if applicable)   []  Stage 1: Intro to Strength   []  Stage 2: Dynamic Strength and Intro to Plyometrics   []  Stage 3: Advanced Plyometrics and Intro to Throwing   []  Stage 4: Sport specific Training/Return to Sport     []  Ready to Return to Play, [employer]   []  Not Ready for Return to Sports   Comments:      Treatment/Activity Tolerance:  [x] Patient tolerated treatment well [] Patient limited by fatique  [] Patient limited by pain  [] Patient limited by other medical complications  [] Other:     Overall Progression Towards Functional goals/ Treatment Progress Update:  [x] Patient is progressing as expected towards functional goals listed. [] Progression is slowed due to complexities/Impairments listed. [] Progression has been slowed due to co-morbidities.   [] Plan just implemented, too soon to assess goals progression <30days   [] Goals require adjustment due to lack of progress  [] Patient is not progressing as expected and requires additional follow up with physician  [] Other    Prognosis for POC: [x] Good [] Fair  [] Poor    Patient requires continued skilled intervention: [x] Yes  [] No      PLAN:   [x] Continue per plan of care [] Alter current plan (see comments)  [] Plan of care initiated [] Hold pending MD visit [] Discharge    Electronically signed by: Cortney Suh PT     Note: If patient does not return for scheduled/recommended follow up visits, this note will serve as a discharge from care along with the most recent update on progress.

## 2021-11-02 ENCOUNTER — HOSPITAL ENCOUNTER (OUTPATIENT)
Dept: PHYSICAL THERAPY | Age: 61
Setting detail: THERAPIES SERIES
Discharge: HOME | End: 2021-11-02
Payer: COMMERCIAL

## 2021-11-02 PROCEDURE — 97110 THERAPEUTIC EXERCISES: CPT

## 2021-11-02 PROCEDURE — 97140 MANUAL THERAPY 1/> REGIONS: CPT

## 2021-11-02 NOTE — FLOWSHEET NOTE
[address] [place], [place] [address]  Phone: [phone] Fax: [phone]    Physical Therapy Treatment Note/ Progress Report:     Date:  2021    Patient Name:  Barry Gaviria    :  1960  MRN: [id number]  Restrictions/Precautions:    Medical/Treatment Diagnosis Information:  · Diagnosis: R medial epidondylitis, R elbow pain m25,521, L SAD, DCR  · Treatment Diagnosis: R medial epidondylitis, R elbow pain V35,777  Insurance/Certification information:     Physician Information:  Referring Practitioner: Dr Rufina Soto of care signed (Y/N):     Date of Patient follow up with Physician:      Progress Report: []  Yes  [x]  No     Date Range for reporting period:  Beginnin21  Ending:       Progress report due (10 Rx/or 30 days whichever is less):       Recertification due (POC duration/ or 90 days whichever is less):       Visit # Insurance Allowable Auth Needed   16 med []Yes    []No     Pain level:  0/10     SUBJECTIVE:  Pt reports he worked out for an hour prior to entering PT today. Played basketball for 1-2 hours this weekend.   Able to throw basketball with L UE.        OBJECTIVE:      Observation:     RESTRICTIONS/PRECAUTIONS: increased valgus laxity, pain with throwing, TTP over R medial epi    Exercises/Interventions:   Therapeutic Ex (78783)  Min: 10 Sets/sec Reps CUES/Notes   UBE 4 min     Pendulum/Ball rolls    Cane AAROM flex/press 2 10 As mando   T- band ER activation scaption 3 8  red   Standing HAB 2 12 green   SL ER/SL punch      Prone Rows/ext      Prone HAB/Prone Flex      Wall Slides 0           Seated HH Depression      No Money 0  Red tube attached to wall   Scap Wall Lat touches/wall walks      Standing ext st 10 sec 5 On table   Standing flex/scap      Wrist flexor/ext st    Lawnmower      IR/ER with band blue   Wrist flex st at wall    Ecc wrist flexion 7 lb   Sleeper st 15 sec 5    Manual Intervention (90788)  Min: 35      Shld /GH Mobs 23 min  PROM to L, inf/post glides to L GH jt    Wrist mobs to improve flex/ext 0min     Post capsule stretch 5 min  Positioning without OP   STM 10 min  Infraspinatus/Teres/Pec   DN 0 min  Wrist flex, med epidcondyle   Elbow mobs 0 min Lateral glide    GISTM 8min  Proximal humerus, deltoid   NMR re-education (30204)  Min:      T-spine Ext      GH depress/compress      Scap/GH NMR      Body blade      Wall ball roll      Wall Ball bounce      Ball drops      Mari Scap Bio      Floor Snow angels-sliders            Therapeutic Activity (62827)  Min:      UE throwing porgression      Dynamic UE stability      Earthquake Bar      Bodyblade                Therapeutic Exercise and NMR EXR  [x] (96317) Provided verbal/tactile cueing for activities related to strengthening, flexibility, endurance, ROM  for improvements in scapular, scapulothoracic and UE control with self care, reaching, carrying, lifting, house/yardwork, driving/computer work. [x] (84214) Provided verbal/tactile cueing for activities related to improving balance, coordination, kinesthetic sense, posture, motor skill, proprioception  to assist with  scapular, scapulothoracic and UE control with self care, reaching, carrying, lifting, house/yardwork, driving/computer work. Therapeutic Activities:    [] (16019 or 99248) Provided verbal/tactile cueing for activities related to improving balance, coordination, kinesthetic sense, posture, motor skill, proprioception and motor activation to allow for proper function of scapular, scapulothoracic and UE control with self care, carrying, lifting, driving/computer work.      Home Exercise Program:    [x] (14500) Reviewed/Progressed HEP activities related to strengthening, flexibility, endurance, ROM of scapular, scapulothoracic and UE control with self care, reaching, carrying, lifting, house/yardwork, driving/computer work  [] (55871) Reviewed/Progressed HEP activities related to improving balance, coordination, kinesthetic sense, posture, motor skill, proprioception of scapular, scapulothoracic and UE control with self care, reaching, carrying, lifting, house/yardwork, driving/computer work      Manual Treatments:  PROM / STM / Oscillations-Mobs:  G-I, II, III, IV (PA's, Inf., Post.)  [x] (98161) Provided manual therapy to mobilize soft tissue/joints of cervical/CT, scapular GHJ and UE for the purpose of modulating pain, promoting relaxation,  increasing ROM, reducing/eliminating soft tissue swelling/inflammation/restriction, improving soft tissue extensibility and allowing for proper ROM for normal function with self care, reaching, carrying, lifting, house/yardwork, driving/computer work          Charges:  Timed Code Treatment Minutes: 40   Total Treatment Minutes: 40       [] EVAL (LOW) 76147 (typically 20 minutes face-to-face)  [] EVAL (MOD) 56270 (typically 30 minutes face-to-face)  [] EVAL (HIGH) 50782 (typically 45 minutes face-to-face)  [] RE-EVAL     [x] NI(81874) x1     [] DRY NEEDLE 1 OR 2 MUSCLES  [] NMR (77348) x     [] DRY NEEDLE 3+ MUSCLES  [x] Manual ([id number]) x2      [] TA (05540) x     [] Mech Traction (05070)  [] ES(attended) (65871)     [] ES (un) (16013):   [] VASO (98181)  [] Other:    If [facility] Please Indicate Time In/Out  CPT Code Time in Time out                                   GOALS:    Patient stated goal: no pain  [] Progressing: [] Met: [] Not Met: [] Adjusted    Therapist goals for Patient:   Short Term Goals: To be achieved in: 2 weeks  1. Independent in HEP and progression per patient tolerance, in order to prevent re-injury. [] Progressing: [] Met: [] Not Met: [] Adjusted  2. Patient will have a decrease in pain to facilitate improvement in movement, function, and ADLs as indicated by Functional Deficits. [] Progressing: [] Met: [] Not Met: [] Adjusted    Long Term Goals: To be achieved in: 5 weeks  1.  Disability index score of 5% or less for the Quick DASH to assist with reaching prior level of function. [] Progressing: [] Met: [] Not Met: [] Adjusted  2. Patient will demonstrate increased [facility] to allow for proper joint functioning as indicated by Functional Deficits. [] Progressing: [] Met: [] Not Met: [] Adjusted  3. Patient will demonstrate an increase in NM recruitment/activation and overall GH and scapular strength to within n5lbs HHD or WNL for proper functional mobility as indicated by patients Functional Deficits. [] Progressing: [] Met: [] Not Met: [] Adjusted  4. Patient will return to throwing with no sharp pain  activities without increased symptoms or restriction. [] Progressing: [] Met: [] Not Met: [] Adjusted  5. No pain with basketball related activity(patient specific functional goal)     [] Progressing: [] Met: [] Not Met: [] Adjusted      ASSESSMENT:  Patient tolerated well. Improved ROM with moderately less pinch with active flexion. Overall good mobility and strength, trying to improve end range flexion. Return to Play: (if applicable)   []  Stage 1: Intro to Strength   []  Stage 2: Dynamic Strength and Intro to Plyometrics   []  Stage 3: Advanced Plyometrics and Intro to Throwing   []  Stage 4: Sport specific Training/Return to Sport     []  Ready to Return to Play, [employer]   []  Not Ready for Return to Sports   Comments:      Treatment/Activity Tolerance:  [x] Patient tolerated treatment well [] Patient limited by fatique  [] Patient limited by pain  [] Patient limited by other medical complications  [] Other:     Overall Progression Towards Functional goals/ Treatment Progress Update:  [x] Patient is progressing as expected towards functional goals listed. [] Progression is slowed due to complexities/Impairments listed. [] Progression has been slowed due to co-morbidities.   [] Plan just implemented, too soon to assess goals progression <30days   [] Goals require adjustment due to lack of

## 2021-11-04 ENCOUNTER — HOSPITAL ENCOUNTER (OUTPATIENT)
Dept: PHYSICAL THERAPY | Age: 61
Setting detail: THERAPIES SERIES
Discharge: HOME | End: 2021-11-04
Payer: COMMERCIAL

## 2021-11-04 PROCEDURE — 97140 MANUAL THERAPY 1/> REGIONS: CPT

## 2021-11-04 PROCEDURE — 97110 THERAPEUTIC EXERCISES: CPT

## 2021-11-04 NOTE — FLOWSHEET NOTE
[facility] [address] [facility]  Phone: [phone] Fax: [phone]    Physical Therapy Treatment Note/ Progress Report:     Date:  2021    Patient Name:  Ariel Shields    :  1960  MRN: [id number]  Restrictions/Precautions:    Medical/Treatment Diagnosis Information:  · Diagnosis: R medial epidondylitis, R elbow pain m25,521, L SAD, DCR  · Treatment Diagnosis: R medial epidondylitis, R elbow pain E95,204  Insurance/Certification information:     Physician Information:  Referring Practitioner: Dr Mariano Jansen of care signed (Y/N):     Date of Patient follow up with Physician:      Progress Report: []  Yes  [x]  No     Date Range for reporting period:  Beginnin21  Ending:       Progress report due (10 Rx/or 30 days whichever is less):       Recertification due (POC duration/ or 90 days whichever is less):       Visit # Insurance Allowable Auth Needed   17 med []Yes    []No     Pain level:  0/7/10     SUBJECTIVE:  Pt reports he worked out for an hour prior to entering PT today. Played basketball for 1-2 hours this weekend. He reports he is sore from the week and working out, taking tomorrow off and maybe Saturday.         OBJECTIVE:      Observation:     RESTRICTIONS/PRECAUTIONS: increased valgus laxity, pain with throwing, TTP over R medial epi    Exercises/Interventions:   Therapeutic Ex (31358)  Min: 10 Sets/sec Reps CUES/Notes   UBE 4 min     Pendulum/Ball rolls    Cane AAROM flex/press 2 10 As mando   T- band ER activation scaption 3 8  red   Standing HAB 2 12 green   SL ER/SL punch      Prone Rows/ext      Prone HAB/Prone Flex      Wall Slides 0           Seated HH Depression      No Money 0  Red tube attached to wall   Scap Wall Lat touches/wall walks      Standing ext st 10 sec 5 On table   Standing flex/scap      Wrist flexor/ext st    Lawnmower      IR/ER with band blue   Wrist flex st at wall    Ecc wrist flexion 7 lb   Sleeper st 15 sec 5    Manual Intervention  (11941)  Min: 35      Shld /GH Mobs 24 min  PROM to L, inf/post glides to L GH jt    Wrist mobs to improve flex/ext 0min     Post capsule stretch 0 min  Positioning without OP   STM 5 min  Infraspinatus/Teres/Pec   DN 0 min  Wrist flex, med epidcondyle   Elbow mobs 0 min Lateral glide    GISTM 0min  Proximal humerus, deltoid   NMR re-education (24454)  Min:      T-spine Ext      GH depress/compress      Scap/GH NMR      Body blade      Wall ball roll      Wall Ball bounce      Ball drops      Mari Scap Bio      Floor Snow angels-sliders            Therapeutic Activity (12851)  Min:      UE throwing porgression      Dynamic UE stability      Earthquake Bar      Bodyblade                Therapeutic Exercise and NMR EXR  [x] (16124) Provided verbal/tactile cueing for activities related to strengthening, flexibility, endurance, ROM  for improvements in scapular, scapulothoracic and UE control with self care, reaching, carrying, lifting, house/yardwork, driving/computer work. [x] (12830) Provided verbal/tactile cueing for activities related to improving balance, coordination, kinesthetic sense, posture, motor skill, proprioception  to assist with  scapular, scapulothoracic and UE control with self care, reaching, carrying, lifting, house/yardwork, driving/computer work. Therapeutic Activities:    [] (76155 or 72723) Provided verbal/tactile cueing for activities related to improving balance, coordination, kinesthetic sense, posture, motor skill, proprioception and motor activation to allow for proper function of scapular, scapulothoracic and UE control with self care, carrying, lifting, driving/computer work.      Home Exercise Program:    [x] (03183) Reviewed/Progressed HEP activities related to strengthening, flexibility, endurance, ROM of scapular, scapulothoracic and UE control with self care, reaching, carrying, lifting, house/yardwork, driving/computer work  [] (61699) Reviewed/Progressed HEP activities related to improving balance, coordination, kinesthetic sense, posture, motor skill, proprioception of scapular, scapulothoracic and UE control with self care, reaching, carrying, lifting, house/yardwork, driving/computer work      Manual Treatments:  PROM / STM / Oscillations-Mobs:  G-I, II, III, IV (PA's, Inf., Post.)  [x] (07203) Provided manual therapy to mobilize soft tissue/joints of cervical/CT, scapular GHJ and UE for the purpose of modulating pain, promoting relaxation,  increasing ROM, reducing/eliminating soft tissue swelling/inflammation/restriction, improving soft tissue extensibility and allowing for proper ROM for normal function with self care, reaching, carrying, lifting, house/yardwork, driving/computer work          Charges:  Timed Code Treatment Minutes: 40   Total Treatment Minutes: 40       [] EVAL (LOW) 83437 (typically 20 minutes face-to-face)  [] EVAL (MOD) 78105 (typically 30 minutes face-to-face)  [] EVAL (HIGH) 89374 (typically 45 minutes face-to-face)  [] RE-EVAL     [x] QZ(21988) x1     [] DRY NEEDLE 1 OR 2 MUSCLES  [] NMR (13016) x     [] DRY NEEDLE 3+ MUSCLES  [x] Manual (07817) x2      [] TA (12173) x     [] Mech Traction (06067)  [] ES(attended) (47164)     [] ES (un) (27795):   [] VASO (35711)  [] Other:    If [facility] Please Indicate Time In/Out  CPT Code Time in Time out                                   GOALS:    Patient stated goal: no pain  [] Progressing: [] Met: [] Not Met: [] Adjusted    Therapist goals for Patient:   Short Term Goals: To be achieved in: 2 weeks  1. Independent in HEP and progression per patient tolerance, in order to prevent re-injury. [] Progressing: [] Met: [] Not Met: [] Adjusted  2. Patient will have a decrease in pain to facilitate improvement in movement, function, and ADLs as indicated by Functional Deficits. [] Progressing: [] Met: [] Not Met: [] Adjusted    Long Term Goals:  To be achieved in: 5 weeks  1. Disability index score of 5% or less for the Quick DASH to assist with reaching prior level of function. [] Progressing: [] Met: [] Not Met: [] Adjusted  2. Patient will demonstrate increased AROM to [facility] to allow for proper joint functioning as indicated by Functional Deficits. [] Progressing: [] Met: [] Not Met: [] Adjusted  3. Patient will demonstrate an increase in NM recruitment/activation and overall GH and scapular strength to within n5lbs HHD or WNL for proper functional mobility as indicated by patients Functional Deficits. [] Progressing: [] Met: [] Not Met: [] Adjusted  4. Patient will return to throwing with no sharp pain  activities without increased symptoms or restriction. [] Progressing: [] Met: [] Not Met: [] Adjusted  5. No pain with basketball related activity(patient specific functional goal)     [] Progressing: [] Met: [] Not Met: [] Adjusted      ASSESSMENT:  Patient tolerated well. Improved ROM with moderately less pinch with active flexion, near symmetrical.   Overall good mobility and strength, trying to improve end range flexion. Return to Play: (if applicable)   []  Stage 1: Intro to Strength   []  Stage 2: Dynamic Strength and Intro to Plyometrics   []  Stage 3: Advanced Plyometrics and Intro to Throwing   []  Stage 4: Sport specific Training/Return to Sport     []  Ready to Return to Play, [employer]   []  Not Ready for Return to Sports   Comments:      Treatment/Activity Tolerance:  [x] Patient tolerated treatment well [] Patient limited by fatique  [] Patient limited by pain  [] Patient limited by other medical complications  [] Other:     Overall Progression Towards Functional goals/ Treatment Progress Update:  [x] Patient is progressing as expected towards functional goals listed. [] Progression is slowed due to complexities/Impairments listed. [] Progression has been slowed due to co-morbidities.   [] Plan just implemented, too soon to assess goals progression <30days   [] Goals require adjustment due to lack of progress  [] Patient is not progressing as expected and requires additional follow up with physician  [] Other    Prognosis for POC: [x] Good [] Fair  [] Poor    Patient requires continued skilled intervention: [x] Yes  [] No      PLAN:   [x] Continue per plan of care [] Alter current plan (see comments)  [] Plan of care initiated [] Hold pending MD visit [] Discharge    Electronically signed by: Prachi Fairchild PT     Note: If patient does not return for scheduled/recommended follow up visits, this note will serve as a discharge from care along with the most recent update on progress.

## 2021-11-09 ENCOUNTER — HOSPITAL ENCOUNTER (OUTPATIENT)
Dept: PHYSICAL THERAPY | Age: 61
Setting detail: THERAPIES SERIES
Discharge: HOME | End: 2021-11-09
Payer: COMMERCIAL

## 2021-11-09 PROCEDURE — 97110 THERAPEUTIC EXERCISES: CPT

## 2021-11-09 PROCEDURE — 97140 MANUAL THERAPY 1/> REGIONS: CPT

## 2021-11-09 NOTE — PLAN OF CARE
[address] [place] [address] Coral Simpson  Phone: [phone] Fax: [phone]      Physical Therapy Re-Certification Plan of Cesar Renae      Dear  Dr Earl Arias,    We had the pleasure of treating the following patient for physical therapy services at [address] [place] [address]. A summary of our findings can be found in the updated assessment below. This includes our plan of care. If you have any questions or concerns regarding these findings, please do not hesitate to contact me at the office phone number checked above. Thank you for the referral.     Physician Signature:________________________________Date:__________________  By signing above (or electronic signature), therapists plan is approved by physician    Date Range Of Visits: 21-11/10/21  Total Visits to Date: 25 total, 12 post op  Overall Response to Treatment:   [x]Patient is responding well to treatment and improvement is noted with regards  to goals   []Patient should continue to improve in reasonable time if they continue HEP   []Patient has plateaued and is no longer responding to skilled PT intervention    []Patient is getting worse and would benefit from return to referring MD   []Patient unable to adhere to initial POC   [x]Other: Patients ROM and strength continue to improve, he is limited with end range flexion and IR but it is still improving. He has played basketball 3x over the past 2 weeks and reports shoulder is doing well.   Will continue to progress until he feels like he can tolerated a competitive game of basketball      Physical Therapy Treatment Note/ Progress Report:     Date:  2021    Patient Name:  Erlinda Candelario    :  1960  MRN: [id number]  Restrictions/Precautions:    Medical/Treatment Diagnosis Information:  · Diagnosis: R medial epidondylitis, R elbow pain m25,521, L SAD, DCR  · Treatment Diagnosis: R medial epidondylitis, R elbow pain [id number]  Insurance/Certification information:     Physician Information:  Referring Practitioner: Dr Gaurav Webb of care signed (Y/N):     Date of Patient follow up with Physician:      Progress Report: []  Yes  [x]  No     Date Range for reporting period:  Beginnin21  Ending:       Progress report due (10 Rx/or 30 days whichever is less):       Recertification due (POC duration/ or 90 days whichever is less):       Visit # Insurance Allowable Auth Needed   18 med []Yes    []No     Pain level:  0/7/10     SUBJECTIVE:  Pt reports he worked out for an hour prior to entering PT today. Played basketball for 1-2 hours this weekend. He reports he is sore from the week and working out, taking tomorrow off and maybe Saturday. OBJECTIVE:      Observation:   Test measurements:  Flexion L 155,R 160, abd L 158, R 150, ER at 90 R 95, L 90, IR at 90 R 45, L 30  MMT L ER 4+, R 5.  IR L 5, R 5, scaption L 4, R 5  RESTRICTIONS/PRECAUTIONS: increased valgus laxity, pain with throwing, TTP over R medial epi    Exercises/Interventions:   Therapeutic Ex (09872)  Min: 10 Sets/sec Reps CUES/Notes   UBE 4 min     Pendulum/Ball rolls    Cane AAROM flex/press 2 10 As mando   T- band ER activation scaption 3 8  red   Standing HAB 2 12 green   SL ER/SL punch      Prone Rows/ext      Prone HAB/Prone Flex      Wall Slides/wand 2 12    TrP to infraspinatus 4 min     Seated HH Depression      No Money 0  Red tube attached to wall   Scap Wall Lat touches/wall walks      Standing ext st 10 sec 5 On table   Standing flex/scap      Wrist flexor/ext st    Lawnmower      IR/ER with band blue   Wrist flex st at wall    Ecc wrist flexion 7 lb   Sleeper st 15 sec 5    Manual Intervention  (31021)  Min: 35      Shld /GH Mobs 25 min  PROM to L, inf/post glides to L GH jt    Wrist mobs to improve flex/ext 0min     Post capsule stretch 0 min  Positioning without OP   STM 10 min  Infraspinatus/Teres/Pec   DN 0 min  Wrist flex, med epidcondyle   Elbow mobs 0 min Lateral glide    GISTM 0min      NMR re-education (62447)  Min:      T-spine Ext      GH depress/compress      Scap/GH NMR      Body blade      Wall ball roll      Wall Ball bounce      Ball drops      Mari Scap Bio      Floor Snow angels-sliders            Therapeutic Activity (19281)  Min:      UE throwing porgression      Dynamic UE stability      Earthquake Bar      Bodyblade                Therapeutic Exercise and NMR EXR  [x] (61365) Provided verbal/tactile cueing for activities related to strengthening, flexibility, endurance, ROM  for improvements in scapular, scapulothoracic and UE control with self care, reaching, carrying, lifting, house/yardwork, driving/computer work. [x] (29999) Provided verbal/tactile cueing for activities related to improving balance, coordination, kinesthetic sense, posture, motor skill, proprioception  to assist with  scapular, scapulothoracic and UE control with self care, reaching, carrying, lifting, house/yardwork, driving/computer work. Therapeutic Activities:    [] (13860 or 54368) Provided verbal/tactile cueing for activities related to improving balance, coordination, kinesthetic sense, posture, motor skill, proprioception and motor activation to allow for proper function of scapular, scapulothoracic and UE control with self care, carrying, lifting, driving/computer work.      Home Exercise Program:    [x] (74380) Reviewed/Progressed HEP activities related to strengthening, flexibility, endurance, ROM of scapular, scapulothoracic and UE control with self care, reaching, carrying, lifting, house/yardwork, driving/computer work  [] (03960) Reviewed/Progressed HEP activities related to improving balance, coordination, kinesthetic sense, posture, motor skill, proprioception of scapular, scapulothoracic and UE control with self care, reaching, carrying, lifting, house/yardwork, driving/computer work      Manual Treatments:  PROM / STM / Oscillations-Mobs:  G-I, II, III, IV (PA's, Inf., Post.)  [x] (09685) Provided manual therapy to mobilize soft tissue/joints of cervical/CT, scapular GHJ and UE for the purpose of modulating pain, promoting relaxation,  increasing ROM, reducing/eliminating soft tissue swelling/inflammation/restriction, improving soft tissue extensibility and allowing for proper ROM for normal function with self care, reaching, carrying, lifting, house/yardwork, driving/computer work          Charges:  Timed Code Treatment Minutes: 45   Total Treatment Minutes: 45       [] EVAL (LOW) 08900 (typically 20 minutes face-to-face)  [] EVAL (MOD) 22354 (typically 30 minutes face-to-face)  [] EVAL (HIGH) 72620 (typically 45 minutes face-to-face)  [] RE-EVAL     [x] RV(17120) x1     [] DRY NEEDLE 1 OR 2 MUSCLES  [] NMR (71419) x     [] DRY NEEDLE 3+ MUSCLES  [x] Manual (99549) x2      [] TA (21730) x     [] Mech Traction (13424)  [] ES(attended) (70702)     [] ES (un) (30202):   [] VASO (39898)  [] Other:    If [facility] Please Indicate Time In/Out  CPT Code Time in Time out                                   GOALS:    Patient stated goal: no pain  [] Progressing: [] Met: [] Not Met: [] Adjusted    Therapist goals for Patient:   Short Term Goals: To be achieved in: 2 weeks  1. Independent in HEP and progression per patient tolerance, in order to prevent re-injury. [] Progressing: [x] Met: [] Not Met: [] Adjusted  2. Patient will have a decrease in pain to facilitate improvement in movement, function, and ADLs as indicated by Functional Deficits. [] Progressing: [x] Met: [] Not Met: [] Adjusted    Long Term Goals: To be achieved in: 5 weeks  1. Disability index score of 5% or less for the Quick DASH to assist with reaching prior level of function. [x] Progressing: [] Met: [] Not Met: [] Adjusted  2. Patient will demonstrate increased AROM to [facility] to allow for proper joint functioning as indicated by Functional Deficits.    [x] Progressing: [] Met: up with physician  [] Other    Prognosis for POC: [x] Good [] Fair  [] Poor    Patient requires continued skilled intervention: [x] Yes  [] No      PLAN: Cont 1-2 x per week for 5 more weeks  [x] Continue per plan of care [] Alter current plan (see comments)  [] Plan of care initiated [] Hold pending MD visit [] Discharge    Electronically signed by: Cortney Suh PT     Note: If patient does not return for scheduled/recommended follow up visits, this note will serve as a discharge from care along with the most recent update on progress.

## 2021-11-11 ENCOUNTER — HOSPITAL ENCOUNTER (OUTPATIENT)
Dept: PHYSICAL THERAPY | Age: 61
Setting detail: THERAPIES SERIES
Discharge: HOME | End: 2021-11-11
Payer: COMMERCIAL

## 2021-11-11 PROCEDURE — 97140 MANUAL THERAPY 1/> REGIONS: CPT

## 2021-11-11 PROCEDURE — 97110 THERAPEUTIC EXERCISES: CPT

## 2021-11-11 NOTE — FLOWSHEET NOTE
[address] [place] [address]  Phone: [phone] Fax: [phone]            Physical Therapy Treatment Note/ Progress Report:     Date:  2021    Patient Name:  Eloisa Bermeo    :  1960  MRN: [id number]  Restrictions/Precautions:    Medical/Treatment Diagnosis Information:  · Diagnosis: R medial epidondylitis, R elbow pain m25,521, L SAD, DCR  · Treatment Diagnosis: R medial epidondylitis, R elbow pain Q42,307  Insurance/Certification information:     Physician Information:  Referring Practitioner: Dr Ruthy Segovia of care signed (Y/N):     Date of Patient follow up with Physician:      Progress Report: []  Yes  [x]  No     Date Range for reporting period:  Beginnin21  Ending:       Progress report due (10 Rx/or 30 days whichever is less):       Recertification due (POC duration/ or 90 days whichever is less):       Visit # Insurance Allowable Auth Needed   19 med []Yes    []No     Pain level:  0-5/10     SUBJECTIVE:  Pt did not lift over the last couple of days due to letting the arm rest and time constraints in his schedule. Feeling stiff and sore through the top and side of the shoulder today. OBJECTIVE:   Pain reproduced with lateral raise   Observation:   Flexion L 155,R 160, abd L 158, R 150, ER at 90 R 95, L 90, IR at 90 R 45, L 30  MMT L ER 4+, R 5.  IR L 5, R 5, scaption L 4, R 5  RESTRICTIONS/PRECAUTIONS: increased valgus laxity, pain with throwing, TTP over R medial epi    Exercises/Interventions:   Therapeutic Ex (33106)  Min: 18 Sets/sec Reps CUES/Notes   UBE 4 min     Slider snow angels 1 10 Range limited bilat, worse on L   Cane AAROM flex/press 2 10 As mando   T- band ER activation scaption 3 8  red   Standing HAB 2 12 green   SL ER/SL punch      Prone scap retraction holds 10 10 No wt   Prone Rows/ext      Prone HAB/Prone Flex      Wall Slides/wand 2 12    TrP to infraspinatus 4 min     Band serratus punch in standing 2 10 Double green   No Money 0  Red tube attached to wall   Scap Wall Lat touches/wall walks      Standing ext st 10 sec 5 On table   Standing flex/scap      Wrist flexor/ext st    Lawnmower      IR/ER with band blue   Wrist flex st at wall    Ecc wrist flexion 7 lb   Sleeper st 15 sec 5    Manual Intervention  (63165)  Min: 25      Shld /GH Mobs 15 min  PROM to L, inf/post glides to L GH jt    Wrist mobs to improve flex/ext 0min     Post capsule stretch 0 min  Positioning without OP   STM 10 min  Infraspinatus/Teres/Pec   DN 0 min  Wrist flex, med epidcondyle   Elbow mobs 0 min Lateral glide    GISTM min      NMR re-education (12746)  Min:      T-spine Ext      GH depress/compress      Scap/GH NMR      Body blade      Wall ball roll      Wall Ball bounce      Ball drops      Mari Scap Bio      Floor Snow angels-sliders            Therapeutic Activity (74884)  Min:      UE throwing porgression      Dynamic UE stability      Earthquake Bar      Bodyblade                Therapeutic Exercise and NMR EXR  [x] (31578) Provided verbal/tactile cueing for activities related to strengthening, flexibility, endurance, ROM  for improvements in scapular, scapulothoracic and UE control with self care, reaching, carrying, lifting, house/yardwork, driving/computer work. [x] (34156) Provided verbal/tactile cueing for activities related to improving balance, coordination, kinesthetic sense, posture, motor skill, proprioception  to assist with  scapular, scapulothoracic and UE control with self care, reaching, carrying, lifting, house/yardwork, driving/computer work.     Therapeutic Activities:    [] (61491 or 98235) Provided verbal/tactile cueing for activities related to improving balance, coordination, kinesthetic sense, posture, motor skill, proprioception and motor activation to allow for proper function of scapular, scapulothoracic and UE control with self care, carrying, lifting, driving/computer work.     Home Exercise Program:    [x] (20110) Reviewed/Progressed HEP activities related to strengthening, flexibility, endurance, ROM of scapular, scapulothoracic and UE control with self care, reaching, carrying, lifting, house/yardwork, driving/computer work  [] (66959) Reviewed/Progressed HEP activities related to improving balance, coordination, kinesthetic sense, posture, motor skill, proprioception of scapular, scapulothoracic and UE control with self care, reaching, carrying, lifting, house/yardwork, driving/computer work      Manual Treatments:  PROM / STM / Oscillations-Mobs:  G-I, II, III, IV (PA's, Inf., Post.)  [x] (00066) Provided manual therapy to mobilize soft tissue/joints of cervical/CT, scapular GHJ and UE for the purpose of modulating pain, promoting relaxation,  increasing ROM, reducing/eliminating soft tissue swelling/inflammation/restriction, improving soft tissue extensibility and allowing for proper ROM for normal function with self care, reaching, carrying, lifting, house/yardwork, driving/computer work          Charges:  Timed Code Treatment Minutes: 43   Total Treatment Minutes: 45       [] EVAL (LOW) 56741 (typically 20 minutes face-to-face)  [] EVAL (MOD) 08169 (typically 30 minutes face-to-face)  [] EVAL (HIGH) 82946 (typically 45 minutes face-to-face)  [] RE-EVAL     [x] PP(15690) x1     [] DRY NEEDLE 1 OR 2 MUSCLES  [] NMR (87997) x     [] DRY NEEDLE 3+ MUSCLES  [x] Manual (90830) x2      [] TA (25714) x     [] Mech Traction (78485)  [] ES(attended) (37240)     [] ES (un) (58070):   [] VASO (84375)  [] Other:    If [facility] Please Indicate Time In/Out  CPT Code Time in Time out                                   GOALS:    Patient stated goal: no pain  [] Progressing: [] Met: [] Not Met: [] Adjusted    Therapist goals for Patient:   Short Term Goals: To be achieved in: 2 weeks  1. Independent in HEP and progression per patient tolerance, in order to prevent re-injury.    [] Progressing: by other medical complications  [] Other:     Overall Progression Towards Functional goals/ Treatment Progress Update:  [x] Patient is progressing as expected towards functional goals listed. [] Progression is slowed due to complexities/Impairments listed. [] Progression has been slowed due to co-morbidities. [] Plan just implemented, too soon to assess goals progression <30days   [] Goals require adjustment due to lack of progress  [] Patient is not progressing as expected and requires additional follow up with physician  [] Other    Prognosis for POC: [x] Good [] Fair  [] Poor    Patient requires continued skilled intervention: [x] Yes  [] No      PLAN: Cont 1-2 x per week for 5 more weeks  [x] Continue per plan of care [] Alter current plan (see comments)  [] Plan of care initiated [] Hold pending MD visit [] Discharge    Electronically signed by: Halina Gillespie PTA     Note: If patient does not return for scheduled/recommended follow up visits, this note will serve as a discharge from care along with the most recent update on progress.

## 2021-11-16 ENCOUNTER — APPOINTMENT (OUTPATIENT)
Dept: PHYSICAL THERAPY | Age: 61
End: 2021-11-16
Payer: COMMERCIAL

## 2021-11-18 ENCOUNTER — APPOINTMENT (OUTPATIENT)
Dept: PHYSICAL THERAPY | Age: 61
End: 2021-11-18
Payer: COMMERCIAL

## 2021-11-23 ENCOUNTER — HOSPITAL ENCOUNTER (OUTPATIENT)
Dept: PHYSICAL THERAPY | Age: 61
Setting detail: THERAPIES SERIES
Discharge: HOME | End: 2021-11-23
Payer: COMMERCIAL

## 2021-11-23 PROCEDURE — 97140 MANUAL THERAPY 1/> REGIONS: CPT

## 2021-11-23 PROCEDURE — 97110 THERAPEUTIC EXERCISES: CPT

## 2021-11-23 NOTE — PLAN OF CARE
[address] [place], [place] [address]Coral delgado  Phone: [phone] Fax: [phone]        Physical Therapy Re-Certification Plan of Missael Rowley      Dear  Dr Josué Vincent,    We had the pleasure of treating the following patient for physical therapy services at [address] [place] [address]. A summary of our findings can be found in the updated assessment below. This includes our plan of care. If you have any questions or concerns regarding these findings, please do not hesitate to contact me at the office phone number checked above. Thank you for the referral.     Physician Signature:________________________________Date:__________________  By signing above (or electronic signature), therapists plan is approved by physician    Date Range Of Visits: 21-21  Total Visits to Date: 21  Overall Response to Treatment:   [x]Patient is responding well to treatment and improvement is noted with regards  to goals   []Patient should continue to improve in reasonable time if they continue HEP   []Patient has plateaued and is no longer responding to skilled PT intervention    []Patient is getting worse and would benefit from return to referring MD   []Patient unable to adhere to initial POC   [x]Other: Patient is making good progress. His ROM is near symmetrical, he does have discomfort with end range flexion. Strength is improving nicely. He tolerated functional daily activity well. Will need more functional mobility and strength/endurance to return to competitive basketball.           Physical Therapy Treatment Note/ Progress Report:     Date:  2021    Patient Name:  Ching Shaikh    :  1960  MRN: [id number]  Restrictions/Precautions:    Medical/Treatment Diagnosis Information:  · Diagnosis: R medial epidondylitis, R elbow pain m25,521, L SAD, DCR  · Treatment Diagnosis: R medial epidondylitis, R elbow pain M02,064  Insurance/Certification information:     Physician Information:  Referring Practitioner: Dr Yuri Qureshi of care signed (Y/N):     Date of Patient follow up with Physician:      Progress Report: []  Yes  [x]  No     Date Range for reporting period:  Beginnin21  Ending:       Progress report due (10 Rx/or 30 days whichever is less):       Recertification due (POC duration/ or 90 days whichever is less):       Visit # Insurance Allowable Auth Needed   20 med []Yes    []No     Pain level:  0-5/10     SUBJECTIVE:  Pt was ill last week and did not do much activity which helped rest shoulder  Played basketball  and shoulder was moved in a couple of directions it didn't like. Hurt for 30 sec and then continued to play. OBJECTIVE:   Pain reproduced with lateral raise   Observation:   Flexion L 155,R 160, abd L 158, R 150, ER at 90 R 95, L 90, IR at 90 R 45, L 30  MMT L ER 5, R 5.  IR L 5, R 5, scaption L 4+, R 5    Stiff with end range flexion  RESTRICTIONS/PRECAUTIONS: increased valgus laxity, pain with throwing, TTP over R medial epi    Exercises/Interventions:   Therapeutic Ex (72919)  Min: 18 Sets/sec Reps CUES/Notes   UBE 4 min     Slider snow angels 1 10 Range limited bilat, worse on L   Cane AAROM flex/press 2 10 As mando   T- band ER activation scaption 3 8  red   Standing HAB 2 12 green   SL ER/SL punch      Prone scap retraction holds 10 10 No wt   Prone Rows/ext      Prone HAB/Prone Flex      Wall Slides/wand 2 12    TrP to infraspinatus 4 min     Band serratus punch in standing 2 10 Double green   No Money 0  Red tube attached to wall   Scap Wall Lat touches/wall walks      Standing ext st 10 sec 5 On table   Standing flex/scap      Wrist flexor/ext st    Lawnmower      IR/ER with band blue   Wrist flex st at wall    Ecc wrist flexion 7 lb   Sleeper st 15 sec 5    Manual Intervention  (25737)  Min: 25      Shld /GH Mobs 15 min  PROM to L, inf/post glides to L GH jt    Wrist mobs to improve flex/ext 0min     Post capsule stretch 5 min  Positioning without OP   STM 8 min  Infraspinatus/Teres/Pec   DN 0 min  Wrist flex, med epidcondyle   Elbow mobs 0 min Lateral glide    GISTM min      NMR re-education (85746)  Min:      T-spine Ext      GH depress/compress      Scap/GH NMR      Body blade      Wall ball roll      Wall Ball bounce      Ball drops      Mari Scap Bio      Floor Snow angels-sliders            Therapeutic Activity (89834)  Min:      UE throwing porgression      Dynamic UE stability      Earthquake Bar      Bodyblade                Therapeutic Exercise and NMR EXR  [x] (80982) Provided verbal/tactile cueing for activities related to strengthening, flexibility, endurance, ROM  for improvements in scapular, scapulothoracic and UE control with self care, reaching, carrying, lifting, house/yardwork, driving/computer work. [x] (91869) Provided verbal/tactile cueing for activities related to improving balance, coordination, kinesthetic sense, posture, motor skill, proprioception  to assist with  scapular, scapulothoracic and UE control with self care, reaching, carrying, lifting, house/yardwork, driving/computer work. Therapeutic Activities:    [] (54023 or 56218) Provided verbal/tactile cueing for activities related to improving balance, coordination, kinesthetic sense, posture, motor skill, proprioception and motor activation to allow for proper function of scapular, scapulothoracic and UE control with self care, carrying, lifting, driving/computer work.      Home Exercise Program:    [x] (35539) Reviewed/Progressed HEP activities related to strengthening, flexibility, endurance, ROM of scapular, scapulothoracic and UE control with self care, reaching, carrying, lifting, house/yardwork, driving/computer work  [] (38741) Reviewed/Progressed HEP activities related to improving balance, coordination, kinesthetic sense, posture, motor skill, proprioception of scapular, scapulothoracic demonstrate increased AROM to [facility] to allow for proper joint functioning as indicated by Functional Deficits. [x] Progressing: [] Met: [] Not Met: [] Adjusted  3. Patient will demonstrate an increase in NM recruitment/activation and overall GH and scapular strength to within n5lbs HHD or WNL for proper functional mobility as indicated by patients Functional Deficits. [x] Progressing: [] Met: [] Not Met: [] Adjusted  4. Patient will return to throwing with no sharp pain  activities without increased symptoms or restriction. [x] Progressing: [] Met: [] Not Met: [] Adjusted  5. No pain with basketball related activity(patient specific functional goal)     [x] Progressing: [] Met: [] Not Met: [] Adjusted      ASSESSMENT:  See above    Return to Play: (if applicable)   []  Stage 1: Intro to Strength   []  Stage 2: Dynamic Strength and Intro to Plyometrics   []  Stage 3: Advanced Plyometrics and Intro to Throwing   []  Stage 4: Sport specific Training/Return to Sport     []  Ready to Return to Play, [employer]   []  Not Ready for Return to Sports   Comments:      Treatment/Activity Tolerance:  [x] Patient tolerated treatment well [] Patient limited by fatique  [] Patient limited by pain  [] Patient limited by other medical complications  [] Other:     Overall Progression Towards Functional goals/ Treatment Progress Update:  [x] Patient is progressing as expected towards functional goals listed. [] Progression is slowed due to complexities/Impairments listed. [] Progression has been slowed due to co-morbidities.   [] Plan just implemented, too soon to assess goals progression <30days   [] Goals require adjustment due to lack of progress  [] Patient is not progressing as expected and requires additional follow up with physician  [] Other    Prognosis for POC: [x] Good [] Fair  [] Poor    Patient requires continued skilled intervention: [x] Yes  [] No      PLAN: Cont 1-2 x per week for 5 more weeks  [x] Continue per plan of care [] Alter current plan (see comments)  [] Plan of care initiated [] Hold pending MD visit [] Discharge    Electronically signed by: Jayshree Parker PT     Note: If patient does not return for scheduled/recommended follow up visits, this note will serve as a discharge from care along with the most recent update on progress.

## 2021-11-30 ENCOUNTER — HOSPITAL ENCOUNTER (OUTPATIENT)
Dept: PHYSICAL THERAPY | Age: 61
Setting detail: THERAPIES SERIES
Discharge: HOME | End: 2021-11-30
Payer: COMMERCIAL

## 2021-11-30 PROCEDURE — 97110 THERAPEUTIC EXERCISES: CPT

## 2021-11-30 PROCEDURE — 97140 MANUAL THERAPY 1/> REGIONS: CPT

## 2021-11-30 NOTE — FLOWSHEET NOTE
[facility] [address] [place]  Phone: [phone] Fax: [phone]            Physical Therapy Treatment Note/ Progress Report:     Date:  2021    Patient Name:  Brando Parkinson    :  1960  MRN: [id number]  Restrictions/Precautions:    Medical/Treatment Diagnosis Information:  · Diagnosis: R medial epidondylitis, R elbow pain m25,521, L SAD, DCR  · Treatment Diagnosis: R medial epidondylitis, R elbow pain G26,789  Insurance/Certification information:     Physician Information:  Referring Practitioner: Dr Jesus [facility] of care signed (Y/N):     Date of Patient follow up with Physician:      Progress Report: []  Yes  [x]  No     Date Range for reporting period:  Beginnin21  Ending:       Progress report due (10 Rx/or 30 days whichever is less):       Recertification due (POC duration/ or 90 days whichever is less):       Visit # Insurance Allowable Auth Needed   21 med []Yes    []No     Pain level:  0-5/10     SUBJECTIVE:  Pt reports MD was happy with progress. He reports working out sat and playing basketball  and is sore today. Still having stiffness with pure abd. OBJECTIVE:   Pain reproduced with lateral raise   Observation:   Flexion L 155,R 160, abd L 158, R 150, ER at 90 R 95, L 90, IR at 90 R 45, L 30  MMT L ER 5, R 5.  IR L 5, R 5, scaption L 4+, R 5    Stiff with end range flexion  RESTRICTIONS/PRECAUTIONS: increased valgus laxity, pain with throwing, TTP over R medial epi    Exercises/Interventions:   Therapeutic Ex (25436)  Min: 18 Sets/sec Reps CUES/Notes   UBE 4 min     Slider snow angels 1 10 Range limited bilat, worse on L   Cane AAROM flex/press 2 10 As mando   T- band ER activation scaption 3 8  red   Standing HAB 2 12 green   SL ER/SL punch      Prone scap retraction holds 10 10 No wt   Prone Rows/ext      Prone HAB/Prone Flex      Wall Slides/wand 2 12    Lat st on wall 10 sec 5 TrP to infraspinatus 4 min     Band serratus punch in standing 2 10 Double green   No Money 0  Red tube attached to wall   Scap Wall Lat touches/wall walks      Standing ext st 10 sec 5 On table   Standing flex/scap      Wrist flexor/ext st    Lawnmower      IR/ER with band blue   Wrist flex st at wall    Ecc wrist flexion 7 lb   Sleeper st 15 sec 5    Manual Intervention  (97784)  Min: 35      Shld /GH Mobs 18 min  PROM to L, inf/post glides to L GH jt    Wrist mobs to improve flex/ext 0min     Post capsule stretch 5 min  Positioning without OP   STM 8 min  Infraspinatus/Teres/Pec   DN 0 min  Wrist flex, med epidcondyle   Elbow mobs 0 min Lateral glide    GISTM min      NMR re-education (42966)  Min:      T-spine Ext      GH depress/compress      Scap/GH NMR      Body blade      Wall ball roll      Wall Ball bounce      Ball drops      Mari Scap Bio      Floor Snow angels-sliders            Therapeutic Activity (03514)  Min:      UE throwing porgression      Dynamic UE stability      Earthquake Bar      Bodyblade                Therapeutic Exercise and NMR EXR  [x] (59186) Provided verbal/tactile cueing for activities related to strengthening, flexibility, endurance, ROM  for improvements in scapular, scapulothoracic and UE control with self care, reaching, carrying, lifting, house/yardwork, driving/computer work. [x] (73081) Provided verbal/tactile cueing for activities related to improving balance, coordination, kinesthetic sense, posture, motor skill, proprioception  to assist with  scapular, scapulothoracic and UE control with self care, reaching, carrying, lifting, house/yardwork, driving/computer work.     Therapeutic Activities:    [] (04423 or 01483) Provided verbal/tactile cueing for activities related to improving balance, coordination, kinesthetic sense, posture, motor skill, proprioception and motor activation to allow for proper function of scapular, scapulothoracic and UE control with self care, carrying, lifting, driving/computer work. Home Exercise Program:    [x] (06015) Reviewed/Progressed HEP activities related to strengthening, flexibility, endurance, ROM of scapular, scapulothoracic and UE control with self care, reaching, carrying, lifting, house/yardwork, driving/computer work  [] (51153) Reviewed/Progressed HEP activities related to improving balance, coordination, kinesthetic sense, posture, motor skill, proprioception of scapular, scapulothoracic and UE control with self care, reaching, carrying, lifting, house/yardwork, driving/computer work      Manual Treatments:  PROM / STM / Oscillations-Mobs:  G-I, II, III, IV (PA's, Inf., Post.)  [x] (47601) Provided manual therapy to mobilize soft tissue/joints of cervical/CT, scapular GHJ and UE for the purpose of modulating pain, promoting relaxation,  increasing ROM, reducing/eliminating soft tissue swelling/inflammation/restriction, improving soft tissue extensibility and allowing for proper ROM for normal function with self care, reaching, carrying, lifting, house/yardwork, driving/computer work          Charges:  Timed Code Treatment Minutes: 43   Total Treatment Minutes: 45       [] EVAL (LOW) 18568 (typically 20 minutes face-to-face)  [] EVAL (MOD) 96403 (typically 30 minutes face-to-face)  [] EVAL (HIGH) 65966 (typically 45 minutes face-to-face)  [] RE-EVAL     [x] LF(64720) x1     [] DRY NEEDLE 1 OR 2 MUSCLES  [] NMR (04574) x     [] DRY NEEDLE 3+ MUSCLES  [x] Manual ([id number]) x2      [] TA (10649) x     [] Mech Traction (73245)  [] ES(attended) (79278)     [] ES (un) (90861):   [] VASO (84344)  [] Other:    If BWC Please Indicate Time In/Out  CPT Code Time in Time out                                   GOALS:    Patient stated goal: no pain  [] Progressing: [] Met: [] Not Met: [] Adjusted    Therapist goals for Patient:   Short Term Goals: To be achieved in: 2 weeks  1.  Independent in HEP and progression per patient tolerance, in order to prevent re-injury. [] Progressing: [x] Met: [] Not Met: [] Adjusted  2. Patient will have a decrease in pain to facilitate improvement in movement, function, and ADLs as indicated by Functional Deficits. [] Progressing: [x] Met: [] Not Met: [] Adjusted    Long Term Goals: To be achieved in: 5 weeks  1. Disability index score of 5% or less for the Quick DASH to assist with reaching prior level of function. [x] Progressing: [] Met: [] Not Met: [] Adjusted  2. Patient will demonstrate increased AROM to [facility] to allow for proper joint functioning as indicated by Functional Deficits. [x] Progressing: [] Met: [] Not Met: [] Adjusted  3. Patient will demonstrate an increase in NM recruitment/activation and overall GH and scapular strength to within n5lbs HHD or WNL for proper functional mobility as indicated by patients Functional Deficits. [x] Progressing: [] Met: [] Not Met: [] Adjusted  4. Patient will return to throwing with no sharp pain  activities without increased symptoms or restriction. [x] Progressing: [] Met: [] Not Met: [] Adjusted  5. No pain with basketball related activity(patient specific functional goal)     [x] Progressing: [] Met: [] Not Met: [] Adjusted      ASSESSMENT: Focused on abd mobility. Mild improvement in ROM in pure abduction and wall gaby. Continue to focus on mobility as patient is going out of town after next week.     Return to Play: (if applicable)   []  Stage 1: Intro to Strength   []  Stage 2: Dynamic Strength and Intro to Plyometrics   []  Stage 3: Advanced Plyometrics and Intro to Throwing   []  Stage 4: Sport specific Training/Return to Sport     []  Ready to Return to Play, [employer]   []  Not Ready for Return to Sports   Comments:      Treatment/Activity Tolerance:  [x] Patient tolerated treatment well [] Patient limited by fatique  [] Patient limited by pain  [] Patient limited by other medical complications  [] Other:     Overall Progression Towards Functional goals/ Treatment Progress Update:  [x] Patient is progressing as expected towards functional goals listed. [] Progression is slowed due to complexities/Impairments listed. [] Progression has been slowed due to co-morbidities. [] Plan just implemented, too soon to assess goals progression <30days   [] Goals require adjustment due to lack of progress  [] Patient is not progressing as expected and requires additional follow up with physician  [] Other    Prognosis for POC: [x] Good [] Fair  [] Poor    Patient requires continued skilled intervention: [x] Yes  [] No      PLAN: Cont 1-2 x per week for 5 more weeks  [x] Continue per plan of care [] Alter current plan (see comments)  [] Plan of care initiated [] Hold pending MD visit [] Discharge    Electronically signed by: Cynthia Sandhu PT     Note: If patient does not return for scheduled/recommended follow up visits, this note will serve as a discharge from care along with the most recent update on progress.

## 2021-12-02 ENCOUNTER — HOSPITAL ENCOUNTER (OUTPATIENT)
Dept: PHYSICAL THERAPY | Age: 61
Setting detail: THERAPIES SERIES
Discharge: HOME | End: 2021-12-02
Payer: COMMERCIAL

## 2021-12-02 ENCOUNTER — APPOINTMENT (OUTPATIENT)
Dept: PHYSICAL THERAPY | Age: 61
End: 2021-12-02
Payer: COMMERCIAL

## 2021-12-02 PROCEDURE — 97140 MANUAL THERAPY 1/> REGIONS: CPT

## 2021-12-02 PROCEDURE — 97110 THERAPEUTIC EXERCISES: CPT

## 2021-12-02 NOTE — FLOWSHEET NOTE
[place] [address] [address] [place]  Phone: [phone] Fax: [phone]            Physical Therapy Treatment Note/ Progress Report:     Date:  2021    Patient Name:  Mayirconstanza Dior    :  1960  MRN: [id number]  Restrictions/Precautions:    Medical/Treatment Diagnosis Information:  · Diagnosis: R medial epidondylitis, R elbow pain m25,521, L SAD, DCR  · Treatment Diagnosis: R medial epidondylitis, R elbow pain Q53,573  Insurance/Certification information:     Physician Information:  Referring Practitioner: Dr Olive Prieto of care signed (Y/N):     Date of Patient follow up with Physician:      Progress Report: []  Yes  [x]  No     Date Range for reporting period:  Beginnin21  Ending:       Progress report due (10 Rx/or 30 days whichever is less):       Recertification due (POC duration/ or 90 days whichever is less):       Visit # Insurance Allowable Auth Needed   22 med []Yes    []No     Pain level:  0-5/10     SUBJECTIVE:  Pt reports that he is just coming from lifting weights today. Pt feeling tired and sore through both shoulders. Pt states that he isn't able to lift nearly the amount that he was lifting before surgery yet. Pt has one more PT visit scheduled for next week before he heads out of town. The L shoulder feels pretty good with normally daily activities; mostly just hurts with exercise at this point. Pt is frustrated that he cannot stand and pull overhead with the band into shoulder flexion. OBJECTIVE:   Pain reproduced with lateral raise at times. Discussed adding supine shoulder flexion with band for HEP to gradually build strength in this range before working as much overhead in standing position.  Observation:   Flexion L 155,R 160, abd L 158, R 150, ER at 90 R 95, L 90, IR at 90 R 45, L 30  MMT L ER 5, R 5.  IR L 5, R 5, scaption L 4+, R 5    Stiff with end range flexion  RESTRICTIONS/PRECAUTIONS: increased valgus laxity, pain with throwing, TTP over R medial epi    Exercises/Interventions:   Therapeutic Ex (07970)  Min: 10 Sets/sec Reps CUES/Notes   UBE 4 min     Slider snow angels 1 10 Range limited bilat, worse on L   Cane AAROM flex/press 2 10 As mando   T- band ER activation scaption 3 8  red   Standing HAB 2 12 green   SL ER/SL punch      Prone scap retraction holds 10 10 No wt   Wall thoracic rotation/shoulder stretch 2 10 1 set per side, standing with staggered stance   Supine band flexion 1 15 Green tube   Prone Rows/ext      Prone HAB/Prone Flex      Wall Slides/wand 2 12    Lat st on wall 10 sec 5    TrP to infraspinatus 4 min     Band serratus punch in standing 2 10 Double green   No Money 0  Red tube attached to wall   Scap Wall Lat touches/wall walks      Standing ext st 10 sec 5 On table   Standing flex/scap      Wrist flexor/ext st    Lawnmower      IR/ER with band blue   Wrist flex st at wall    Ecc wrist flexion 7 lb   Sleeper st 15 sec 5    Manual Intervention  (98694)  Min: 35      Shld /GH Mobs 18 min  PROM to L, inf/post glides to L GH jt    Prone thoracic PA's and UT STM 4     Wrist mobs to improve flex/ext 0min     Post capsule stretch 5 min  Positioning without OP   STM 8 min  Infraspinatus/Teres/Pec   DN 0 min  Wrist flex, med epidcondyle   Elbow mobs 0 min Lateral glide    GISTM min      NMR re-education (41404)  Min:      T-spine Ext      GH depress/compress      Scap/GH NMR      Body blade      Wall ball roll      Wall Ball bounce      Ball drops      Mari Scap Bio      Floor Snow angels-sliders            Therapeutic Activity (48602)  Min:      UE throwing porgression      Dynamic UE stability      Earthquake Bar      Bodyblade                Therapeutic Exercise and NMR EXR  [x] (60848) Provided verbal/tactile cueing for activities related to strengthening, flexibility, endurance, ROM  for improvements in scapular, scapulothoracic and UE control with self care, reaching, carrying, lifting, house/yardwork, driving/computer work. [x] (43738) Provided verbal/tactile cueing for activities related to improving balance, coordination, kinesthetic sense, posture, motor skill, proprioception  to assist with  scapular, scapulothoracic and UE control with self care, reaching, carrying, lifting, house/yardwork, driving/computer work. Therapeutic Activities:    [] (48167 or 86162) Provided verbal/tactile cueing for activities related to improving balance, coordination, kinesthetic sense, posture, motor skill, proprioception and motor activation to allow for proper function of scapular, scapulothoracic and UE control with self care, carrying, lifting, driving/computer work.      Home Exercise Program:    [x] (08669) Reviewed/Progressed HEP activities related to strengthening, flexibility, endurance, ROM of scapular, scapulothoracic and UE control with self care, reaching, carrying, lifting, house/yardwork, driving/computer work  [] (80526) Reviewed/Progressed HEP activities related to improving balance, coordination, kinesthetic sense, posture, motor skill, proprioception of scapular, scapulothoracic and UE control with self care, reaching, carrying, lifting, house/yardwork, driving/computer work      Manual Treatments:  PROM / STM / Oscillations-Mobs:  G-I, II, III, IV (PA's, Inf., Post.)  [x] (13633) Provided manual therapy to mobilize soft tissue/joints of cervical/CT, scapular GHJ and UE for the purpose of modulating pain, promoting relaxation,  increasing ROM, reducing/eliminating soft tissue swelling/inflammation/restriction, improving soft tissue extensibility and allowing for proper ROM for normal function with self care, reaching, carrying, lifting, house/yardwork, driving/computer work          Charges:  Timed Code Treatment Minutes: 45   Total Treatment Minutes: 50       [] EVAL (LOW) 96131 (typically 20 minutes face-to-face)  [] EVAL (MOD) 16115 (typically 30 minutes face-to-face)  [] EVAL (HIGH) 93864 (typically 45 minutes face-to-face)  [] RE-EVAL     [x] XM(53557) x1     [] DRY NEEDLE 1 OR 2 MUSCLES  [] NMR (75015) x     [] DRY NEEDLE 3+ MUSCLES  [x] Manual (79217) x 2      [] TA (94717) x     [] Mech Traction (83499)  [] ES(attended) (09635)     [] ES (un) (99409):   [] VASO (21756)  [] Other:    If BWC Please Indicate Time In/Out  CPT Code Time in Time out                                   GOALS:    Patient stated goal: no pain  [] Progressing: [] Met: [] Not Met: [] Adjusted    Therapist goals for Patient:   Short Term Goals: To be achieved in: 2 weeks  1. Independent in HEP and progression per patient tolerance, in order to prevent re-injury. [] Progressing: [x] Met: [] Not Met: [] Adjusted  2. Patient will have a decrease in pain to facilitate improvement in movement, function, and ADLs as indicated by Functional Deficits. [] Progressing: [x] Met: [] Not Met: [] Adjusted    Long Term Goals: To be achieved in: 5 weeks  1. Disability index score of 5% or less for the Quick DASH to assist with reaching prior level of function. [x] Progressing: [] Met: [] Not Met: [] Adjusted  2. Patient will demonstrate increased AROM to [facility] to allow for proper joint functioning as indicated by Functional Deficits. [x] Progressing: [] Met: [] Not Met: [] Adjusted  3. Patient will demonstrate an increase in NM recruitment/activation and overall GH and scapular strength to within n5lbs HHD or WNL for proper functional mobility as indicated by patients Functional Deficits. [x] Progressing: [] Met: [] Not Met: [] Adjusted  4. Patient will return to throwing with no sharp pain  activities without increased symptoms or restriction. [x] Progressing: [] Met: [] Not Met: [] Adjusted  5. No pain with basketball related activity(patient specific functional goal)     [x] Progressing: [] Met: [] Not Met: [] Adjusted      ASSESSMENT: Focused on abd mobility.   Mild improvement in ROM in pure abduction and wall gaby. Minimal discomfort with resisted supine shoulder flexion. Continue to focus on mobility as patient is going out of town after next week. Return to Play: (if applicable)   []  Stage 1: Intro to Strength   []  Stage 2: Dynamic Strength and Intro to Plyometrics   []  Stage 3: Advanced Plyometrics and Intro to Throwing   []  Stage 4: Sport specific Training/Return to Sport     []  Ready to Return to Play, [employer]   []  Not Ready for Return to Sports   Comments:      Treatment/Activity Tolerance:  [x] Patient tolerated treatment well [] Patient limited by fatique  [] Patient limited by pain  [] Patient limited by other medical complications  [] Other:     Overall Progression Towards Functional goals/ Treatment Progress Update:  [x] Patient is progressing as expected towards functional goals listed. [] Progression is slowed due to complexities/Impairments listed. [] Progression has been slowed due to co-morbidities. [] Plan just implemented, too soon to assess goals progression <30days   [] Goals require adjustment due to lack of progress  [] Patient is not progressing as expected and requires additional follow up with physician  [] Other    Prognosis for POC: [x] Good [] Fair  [] Poor    Patient requires continued skilled intervention: [x] Yes  [] No      PLAN: Cont 1-2 x per week for 5 more weeks  [x] Continue per plan of care [] Alter current plan (see comments)  [] Plan of care initiated [] Hold pending MD visit [] Discharge    Electronically signed by: Chanelle Pollard PTA     Note: If patient does not return for scheduled/recommended follow up visits, this note will serve as a discharge from care along with the most recent update on progress.

## 2021-12-07 ENCOUNTER — HOSPITAL ENCOUNTER (OUTPATIENT)
Dept: PHYSICAL THERAPY | Age: 61
Setting detail: THERAPIES SERIES
Discharge: HOME | End: 2021-12-07
Payer: COMMERCIAL

## 2021-12-07 PROCEDURE — 97110 THERAPEUTIC EXERCISES: CPT

## 2021-12-07 PROCEDURE — 97140 MANUAL THERAPY 1/> REGIONS: CPT

## 2021-12-07 NOTE — FLOWSHEET NOTE
[address] [place] [address]  Phone: [phone] Fax: [phone]            Physical Therapy Treatment Note/ Progress Report:     Date:  2021    Patient Name:  Sandro Meneses    :  1960  MRN: [id number]  Restrictions/Precautions:    Medical/Treatment Diagnosis Information:  · Diagnosis: R medial epidondylitis, R elbow pain m25,521, L SAD, DCR  · Treatment Diagnosis: R medial epidondylitis, R elbow pain J87,989  Insurance/Certification information:     Physician Information:  Referring Practitioner: Dr Efra Castillo of care signed (Y/N):     Date of Patient follow up with Physician:      Progress Report: []  Yes  [x]  No     Date Range for reporting period:  Beginnin21  Ending:       Progress report due (10 Rx/or 30 days whichever is less):       Recertification due (POC duration/ or 90 days whichever is less):       Visit # Insurance Allowable Auth Needed   23 med []Yes    []No     Pain level:  0-5/10     SUBJECTIVE:  Pt reports that he is just coming from lifting weights today. Pt feeling tired and sore through the top of the L shoulder. 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 Pt states that he will be doing a good amount of \"banding\" for birds where he will be reaching up repeatedly. Pt plans to return to PT in 3 weeks when he returns to follow-up. Pt voices understanding of HEP and what stretches to work on while he is out of town. Pt states that his R elbow is doing well. Pt was playing basketball on  and injured his right thumb, which has been dislocated before and had other aggravated injuries since then. OBJECTIVE:   Pain reproduced with lateral raise at times. Discussed adding supine shoulder flexion with band for HEP to gradually build strength in this range before working as much overhead in standing position. Earthquake Bar      Bodyblade                Therapeutic Exercise and NMR EXR  [x] (81601) Provided verbal/tactile cueing for activities related to strengthening, flexibility, endurance, ROM  for improvements in scapular, scapulothoracic and UE control with self care, reaching, carrying, lifting, house/yardwork, driving/computer work. [x] (69206) Provided verbal/tactile cueing for activities related to improving balance, coordination, kinesthetic sense, posture, motor skill, proprioception  to assist with  scapular, scapulothoracic and UE control with self care, reaching, carrying, lifting, house/yardwork, driving/computer work. Therapeutic Activities:    [] (64739 or 30131) Provided verbal/tactile cueing for activities related to improving balance, coordination, kinesthetic sense, posture, motor skill, proprioception and motor activation to allow for proper function of scapular, scapulothoracic and UE control with self care, carrying, lifting, driving/computer work.      Home Exercise Program:    [x] (83250) Reviewed/Progressed HEP activities related to strengthening, flexibility, endurance, ROM of scapular, scapulothoracic and UE control with self care, reaching, carrying, lifting, house/yardwork, driving/computer work  [] (48193) Reviewed/Progressed HEP activities related to improving balance, coordination, kinesthetic sense, posture, motor skill, proprioception of scapular, scapulothoracic and UE control with self care, reaching, carrying, lifting, house/yardwork, driving/computer work      Manual Treatments:  PROM / STM / Oscillations-Mobs:  G-I, II, III, IV (PA's, Inf., Post.)  [x] (59232) Provided manual therapy to mobilize soft tissue/joints of cervical/CT, scapular GHJ and UE for the purpose of modulating pain, promoting relaxation,  increasing ROM, reducing/eliminating soft tissue swelling/inflammation/restriction, improving soft tissue extensibility and allowing for proper ROM for normal function with self care, reaching, carrying, lifting, house/yardwork, driving/computer work          Charges:  Timed Code Treatment Minutes: 38   Total Treatment Minutes: 40       [] EVAL (LOW) 84362 (typically 20 minutes face-to-face)  [] EVAL (MOD) 10550 (typically 30 minutes face-to-face)  [] EVAL (HIGH) 53933 (typically 45 minutes face-to-face)  [] RE-EVAL     [x] CP(36053) x 1     [] DRY NEEDLE 1 OR 2 MUSCLES  [] NMR (94517) x     [] DRY NEEDLE 3+ MUSCLES  [x] Manual (37656) x 2      [] TA (93782) x     [] Mech Traction (37214)  [] ES(attended) (31167)     [] ES (un) (58019):   [] VASO (51118)  [] Other:    If BWC Please Indicate Time In/Out  CPT Code Time in Time out                                   GOALS:    Patient stated goal: no pain  [] Progressing: [] Met: [] Not Met: [] Adjusted    Therapist goals for Patient:   Short Term Goals: To be achieved in: 2 weeks  1. Independent in HEP and progression per patient tolerance, in order to prevent re-injury. [] Progressing: [x] Met: [] Not Met: [] Adjusted  2. Patient will have a decrease in pain to facilitate improvement in movement, function, and ADLs as indicated by Functional Deficits. [] Progressing: [x] Met: [] Not Met: [] Adjusted    Long Term Goals: To be achieved in: 5 weeks  1. Disability index score of 5% or less for the Quick DASH to assist with reaching prior level of function. [x] Progressing: [] Met: [] Not Met: [] Adjusted  2. Patient will demonstrate increased AROM to [facility] to allow for proper joint functioning as indicated by Functional Deficits. [x] Progressing: [] Met: [] Not Met: [] Adjusted  3. Patient will demonstrate an increase in NM recruitment/activation and overall GH and scapular strength to within n5lbs HHD or WNL for proper functional mobility as indicated by patients Functional Deficits. [x] Progressing: [] Met: [] Not Met: [] Adjusted  4.  Patient will return to throwing with no sharp pain  activities without increased symptoms or restriction. [x] Progressing: [] Met: [] Not Met: [] Adjusted  5. No pain with basketball related activity(patient specific functional goal)     [x] Progressing: [] Met: [] Not Met: [] Adjusted      ASSESSMENT: Focused on multi-angle mobility. Mild improvement in ROM in abduction and supine flexion. Less pain associated with PROM today. Pt lacks compressive strength with standing exercises. Return to Play: (if applicable)   []  Stage 1: Intro to Strength   []  Stage 2: Dynamic Strength and Intro to Plyometrics   []  Stage 3: Advanced Plyometrics and Intro to Throwing   []  Stage 4: Sport specific Training/Return to Sport     []  Ready to Return to Play, [employer]   []  Not Ready for Return to Sports   Comments:      Treatment/Activity Tolerance:  [x] Patient tolerated treatment well [] Patient limited by fatique  [] Patient limited by pain  [] Patient limited by other medical complications  [] Other:     Overall Progression Towards Functional goals/ Treatment Progress Update:  [x] Patient is progressing as expected towards functional goals listed. [] Progression is slowed due to complexities/Impairments listed. [] Progression has been slowed due to co-morbidities. [] Plan just implemented, too soon to assess goals progression <30days   [] Goals require adjustment due to lack of progress  [] Patient is not progressing as expected and requires additional follow up with physician  [] Other    Prognosis for POC: [x] Good [] Fair  [] Poor    Patient requires continued skilled intervention: [x] Yes  [] No      PLAN: Pt going OOT and will return in a few weeks when he gets back in town.     [x] Continue per plan of care [] Alter current plan (see comments)  [] Plan of care initiated [] Hold pending MD visit [] Discharge    Electronically signed by: Jorge Persaud PTA     Note: If patient does not return for scheduled/recommended follow up visits, this note will serve as a discharge from care along with the most recent update on progress.

## 2021-12-09 ENCOUNTER — APPOINTMENT (OUTPATIENT)
Dept: PHYSICAL THERAPY | Age: 61
End: 2021-12-09
Payer: COMMERCIAL

## 2021-12-21 ENCOUNTER — HOSPITAL ENCOUNTER (OUTPATIENT)
Dept: PHYSICAL THERAPY | Age: 61
Setting detail: THERAPIES SERIES
Discharge: HOME | End: 2021-12-21
Payer: COMMERCIAL

## 2021-12-21 PROCEDURE — 97140 MANUAL THERAPY 1/> REGIONS: CPT

## 2021-12-21 PROCEDURE — 97110 THERAPEUTIC EXERCISES: CPT

## 2021-12-21 PROCEDURE — 20560 NDL INSJ W/O NJX 1 OR 2 MUSC: CPT

## 2021-12-21 NOTE — FLOWSHEET NOTE
[address]Maykel[facility]  Phone: [phone] Fax: [phone]            Physical Therapy Treatment Note/ Progress Report:     Date:  2021    Patient Name:  Tressa Sow    :  1960  MRN: [id number]  Restrictions/Precautions:    Medical/Treatment Diagnosis Information:  · Diagnosis: R medial epidondylitis, R elbow pain m25,521, L SAD, DCR  · Treatment Diagnosis: R medial epidondylitis, R elbow pain Q96,105  Insurance/Certification information:     Physician Information:  Referring Practitioner: Dr Steven Beltrán of care signed (Y/N):     Date of Patient follow up with Physician:      Progress Report: []  Yes  [x]  No     Date Range for reporting period:  Beginnin21  Ending:       Progress report due (10 Rx/or 30 days whichever is less):       Recertification due (POC duration/ or 90 days whichever is less):       Visit # Insurance Allowable Auth Needed   24 med []Yes    []No     Pain level:  0-5/10     SUBJECTIVE:  Pt reports he was in [place] for a few weeks, did not do anything and is still sore in ac joint and also post infraspinatus. Sore sleeping on left side     OBJECTIVE:   Pain reproduced with lateral raise at times. Discussed adding supine shoulder flexion with band for HEP to gradually build strength in this range before working as much overhead in standing position.  Observation:   Test measurements:  Flexion L 155,R 160, abd L 158, R 150, ER at 90 R 95, L 90, IR at 90 R 45, L 30  MMT L ER 5, R 5.  IR L 5, R 5, scaption L 4+, R 5    Stiff with end range flexion  RESTRICTIONS/PRECAUTIONS: increased valgus laxity, pain with throwing, TTP over R medial epi    Exercises/Interventions:   Therapeutic Ex (74298)  Min: 15 Sets/sec Reps CUES/Notes   UBE 4 min     Slider snow angels 1 10 Range limited bilat, worse on L   Cane AAROM flex/press 2 10 As mando   T- band ER activation scaption 3 8  red Standing HAB 2 12 green   SL ER/SL punch      Prone scap retraction holds 10 10 No wt   Wall thoracic rotation/shoulder stretch 2 10 1 set per side, standing with staggered stance   Supine band series 2 10 Green tube, flexion, scaption, HAB   Plank on EOB c serratus focus 1 10 Initiated and Pt tried for upcoming trip   Prone HAB/Prone Flex      Wall Slides/wand 2 12    Lat st on wall 10 sec 5    TrP to infraspinatus 4 min     Band serratus punch in standing 2 10 Double green   No Money 0  Red tube attached to wall   Scap Wall Lat touches/wall walks      Standing ext st 10 sec 5 On table   Standing flex/scap      Wrist flexor/ext st    Lawnmower      IR/ER with band blue   Wrist flex st at wall    Ecc wrist flexion 7 lb   Sleeper st 15 sec 5    Manual Intervention  (72464)  Min: 23      Shld /GH Mobs 15 min  PROM to L, inf/post glides to L GH jt    Prone thoracic PA's and UT STM 4     Wrist mobs to improve flex/ext 0min     Post capsule stretch 5 min  Positioning without OP   STM 4 min  Infraspinatus/Teres/Pec   DN 10 min  Infraspinatus   Elbow mobs 0 min Lateral glide    GISTM min      NMR re-education (39234)  Min:      T-spine Ext      GH depress/compress      Scap/GH NMR      Body blade      Wall ball roll      Wall Ball bounce      Ball drops      Mari Scap Bio      Floor Snow angels-sliders            Therapeutic Activity (24063)  Min:      UE throwing porgression      Dynamic UE stability      Earthquake Bar      Bodyblade                Therapeutic Exercise and NMR EXR  [x] (82471) Provided verbal/tactile cueing for activities related to strengthening, flexibility, endurance, ROM  for improvements in scapular, scapulothoracic and UE control with self care, reaching, carrying, lifting, house/yardwork, driving/computer work.     [x] (10631) Provided verbal/tactile cueing for activities related to improving balance, coordination, kinesthetic sense, posture, motor skill, proprioception  to assist with  scapular, scapulothoracic and UE control with self care, reaching, carrying, lifting, house/yardwork, driving/computer work. Therapeutic Activities:    [] (25335 or 89782) Provided verbal/tactile cueing for activities related to improving balance, coordination, kinesthetic sense, posture, motor skill, proprioception and motor activation to allow for proper function of scapular, scapulothoracic and UE control with self care, carrying, lifting, driving/computer work. Home Exercise Program:    [x] (38267) Reviewed/Progressed HEP activities related to strengthening, flexibility, endurance, ROM of scapular, scapulothoracic and UE control with self care, reaching, carrying, lifting, house/yardwork, driving/computer work  [] (94773) Reviewed/Progressed HEP activities related to improving balance, coordination, kinesthetic sense, posture, motor skill, proprioception of scapular, scapulothoracic and UE control with self care, reaching, carrying, lifting, house/yardwork, driving/computer work      Manual Treatments:  PROM / STM / Oscillations-Mobs:  G-I, II, III, IV (PA's, Inf., Post.)  [x] (61910) Provided manual therapy to mobilize soft tissue/joints of cervical/CT, scapular GHJ and UE for the purpose of modulating pain, promoting relaxation,  increasing ROM, reducing/eliminating soft tissue swelling/inflammation/restriction, improving soft tissue extensibility and allowing for proper ROM for normal function with self care, reaching, carrying, lifting, house/yardwork, driving/computer work      Dry needling manual therapy: consisted on the placement of 4 needles in the following muscles:  wrist flexor,  Infraspinatus (bony backdrop) mm,  . A 40 mm needle was inserted, piston, rotated, and coned to produce intramuscular mobilization. These techniques were used to restore functional range of motion, reduce muscle spasm and induce healing in the corresponding musculature.  (01289)  Clean Technique was utilized today while applying Dry needling treatment. The treatment sites where cleaned with 70% solution of  isopropyl alcohol . The PT washed their hands and utilized treatment gloves along with hand  prior to inserting the needles. All needles where removed and discarded in the appropriate sharps container. Modalities: Vaso due to swelling post op     Charges:  Timed Code Treatment Minutes: 38   Total Treatment Minutes: 40       [] EVAL (LOW) 08809 (typically 20 minutes face-to-face)  [] EVAL (MOD) 32620 (typically 30 minutes face-to-face)  [] EVAL (HIGH) 19896 (typically 45 minutes face-to-face)  [] RE-EVAL     [x] IB(21455) x 1     [x] DRY NEEDLE 1 OR 2 MUSCLES  [] NMR (80184) x     [] DRY NEEDLE 3+ MUSCLES  [x] Manual (20042) x 1      [] TA (20992) x     [] Mech Traction (92281)  [] ES(attended) (53510)     [] ES (un) (31605):   [] VASO (23543)  [] Other:    If [facility] Please Indicate Time In/Out  CPT Code Time in Time out                                   GOALS:    Patient stated goal: no pain  [] Progressing: [] Met: [] Not Met: [] Adjusted    Therapist goals for Patient:   Short Term Goals: To be achieved in: 2 weeks  1. Independent in HEP and progression per patient tolerance, in order to prevent re-injury. [] Progressing: [x] Met: [] Not Met: [] Adjusted  2. Patient will have a decrease in pain to facilitate improvement in movement, function, and ADLs as indicated by Functional Deficits. [] Progressing: [x] Met: [] Not Met: [] Adjusted    Long Term Goals: To be achieved in: 5 weeks  1. Disability index score of 5% or less for the Quick DASH to assist with reaching prior level of function. [x] Progressing: [] Met: [] Not Met: [] Adjusted  2. Patient will demonstrate increased AROM to [facility] to allow for proper joint functioning as indicated by Functional Deficits. [x] Progressing: [] Met: [] Not Met: [] Adjusted  3.  Patient will demonstrate an increase in NM recruitment/activation and overall GH and scapular strength to within n5lbs HHD or WNL for proper functional mobility as indicated by patients Functional Deficits. [x] Progressing: [] Met: [] Not Met: [] Adjusted  4. Patient will return to throwing with no sharp pain  activities without increased symptoms or restriction. [x] Progressing: [] Met: [] Not Met: [] Adjusted  5. No pain with basketball related activity(patient specific functional goal)     [x] Progressing: [] Met: [] Not Met: [] Adjusted      ASSESSMENT: Focused on multi-angle mobility and DN due to TrP in infraspinatus with lateral shoulder radiation. TTP over [facility] joint. Follows up with MD next week     Return to Play: (if applicable)   []  Stage 1: Intro to Strength   []  Stage 2: Dynamic Strength and Intro to Plyometrics   []  Stage 3: Advanced Plyometrics and Intro to Throwing   []  Stage 4: Sport specific Training/Return to Sport     []  Ready to Return to Play, [employer]   []  Not Ready for Return to Sports   Comments:      Treatment/Activity Tolerance:  [x] Patient tolerated treatment well [] Patient limited by fatique  [] Patient limited by pain  [] Patient limited by other medical complications  [] Other:     Overall Progression Towards Functional goals/ Treatment Progress Update:  [x] Patient is progressing as expected towards functional goals listed. [] Progression is slowed due to complexities/Impairments listed. [] Progression has been slowed due to co-morbidities. [] Plan just implemented, too soon to assess goals progression <30days   [] Goals require adjustment due to lack of progress  [] Patient is not progressing as expected and requires additional follow up with physician  [] Other    Prognosis for POC: [x] Good [] Fair  [] Poor    Patient requires continued skilled intervention: [x] Yes  [] No      PLAN: Pt going OOT and will return in a few weeks when he gets back in town.     [x] Continue per plan of care [] Alter current plan (see comments)  [] Plan of care initiated [] Hold pending MD visit [] Discharge    Electronically signed by: Prachi Fairchild PT     Note: If patient does not return for scheduled/recommended follow up visits, this note will serve as a discharge from care along with the most recent update on progress.

## 2022-11-22 ENCOUNTER — HOSPITAL ENCOUNTER (OUTPATIENT)
Dept: PHYSICAL THERAPY | Age: 62
Setting detail: THERAPIES SERIES
Discharge: HOME | End: 2022-11-22
Payer: COMMERCIAL

## 2022-11-22 PROCEDURE — 97124 MASSAGE THERAPY: CPT

## 2022-11-22 PROCEDURE — 97161 PT EVAL LOW COMPLEX 20 MIN: CPT

## 2022-11-22 PROCEDURE — 97110 THERAPEUTIC EXERCISES: CPT

## 2022-11-22 PROCEDURE — 97140 MANUAL THERAPY 1/> REGIONS: CPT

## 2022-11-22 NOTE — PLAN OF CARE
Coral Velasco  Phone: [phone]   Fax:     [phone]                                                       Physical Therapy Certification    Dear Alton Perkins DO,    We had the pleasure of evaluating the following patient for physical therapy services at [address] [place] [address]. A summary of our findings can be found in the initial assessment below. This includes our plan of care. If you have any questions or concerns regarding these findings, please do not hesitate to contact me at the office phone number checked above. Thank you for the referral.       Physician Signature:_______________________________Date:__________________  By signing above (or electronic signature), therapists plan is approved by physician              Patient: Abril Cuevas   : 1960   MRN: [id number]  Referring Physician: Alton Perkins DO      Evaluation Date: 2022    Medical Diagnosis Information:   M25.521 Pain in right elbow/epicondylar mass injury/mid back pain    M25.521 Pain in right elbow/epicondylar mass injury/mid back pain                                         Insurance information:      Precautions/ Contra-indications: NA  Latex Allergy:   [x]  NO      []YES  Preferred Language for Healthcare:   [x]English       []other:    C-SSRS Triggered by Intake questionnaire (Past 2 wk assessment ):   [x] No, Questionnaire did not trigger screening.   [] Yes, Patient intake triggered C-SSRS Screening      [] C-SSRS Screening completed  [] PCP notified via Epic     SUBJECTIVE: Patient stated complaint:Patient presents today with complaints of R elbow pain and thoracic /rib pain. He reports approx 1 month ago he was moving a washer and felt a \"pop\" in R elbow and had significant increase in weakness. He reports increased swelling and significant limitation in function of R elbow.   He reports difficulty flexing R elbow, difficulty using R arm to eat and also inability to lift coffee cup due to pain. He reports his symptoms have somewhat improved and can bring his fork to his mouth but this motion is guarded. He denies any symptoms proximal to R elbow, he does report intermittent tingling in distal forearm. He reports swelling has subsided the past week. Xray at [facility] was negative. He also has lower rib/thoracic pain after playing basketball 6 weeks ago. He had COVID and then developed double pneumonia. This subsided and he returned to playing basketball and developed bilateral lower rib pain and sensitivity. He reports pain currently with reaching OH. He did have pain when laying on side while sleeping but this has improved. He does report weight loss but this is due to COVID. His goal is to return to basketball and full use of R elbow.     Relevant Medical History:COVID x 2  Functional Scale/Score: FOTO    Pain Scale: 0-7/10  Easing factors: not moving elbow  Provocative factors: bending elbow, eating,  lifting coffee cup    Type: [x]Constant   []Intermittent  []Radiating []Localized []other:     Numbness/Tingling: intermittent R forearm    Occupation[place] [place] [employer]     Living Status/Prior Level of Function: Independent with ADLs and IADLs,      OBJECTIVE:     CERV ROM     Cervical Flexion     Cervical Extension     Cervical SB     Cervical rotation          ROM Left Right   Shoulder Flex 140 deg pain in lower thoracic rib 140 deg pain in lower thoracic rib   Elbow flex supinated WNL WNL   Elbow flex neutral Guarded but full WNL   Elbow flex pronated 30 deg with pain WNL   Wrist flex/ext WNL WNL   Pro/Sup WNl WNL   Strength  Left Right   Elbow flex sup 5 5   Elbow flex pro 2+ pain  5   Sup/Pro 3 pain/5 5/5   Wrist est elbow flex 5 5   Wrist ext with elbow ext 2+ pain  5    40 lb pain  90 lb      Reflexes Normal Abnormal Comments   [x]ALL NORMAL            S1-2 Seated achilles [] []    S1-2 Prone knee bend [] []    L3-4 Patellar tendon [] []    C5-6 Biceps [] []    C6 Brachioradialis [] []    C7-8 Triceps [] []    Clonus [] []    Babinski [] []    Saleh's [] []      Reflexes/Sensation:    [x]Dermatomes/Myotomes intact    [x]Reflexes equal and normal bilaterally   []Other:    Joint mobility: hypo radial head   []Normal    []Hypo   []Hyper    Palpation: TTP over extensor mass  R arm and tender to palpation over lateral rib 7-10    Functional Mobility/Transfers: unable to use R UE to perform trasnfer    Posture: mild kyphosis    Bandages/Dressings/Incisions: NA     Gait: (include devices/WB status): WNL     Orthopedic Special Tests: Neg maudsley, Weakness with ECRL, no pain with ECRB                       [x] Patient history, allergies, meds reviewed. Medical chart reviewed. See intake form. Review Of Systems (ROS):  [x]Performed Review of systems (Integumentary, CardioPulmonary, Neurological) by intake and observation. Intake form has been scanned into medical record. Patient has been instructed to contact their primary care physician regarding ROS issues if not already being addressed at this time.       Co-morbidities/Complexities (which will affect course of rehabilitation):    []None           Arthritic conditions   []Rheumatoid arthritis (M05.9)  []Osteoarthritis (M19.91)   Cardiovascular conditions   [x]Hypertension (I10)  []Hyperlipidemia (E78.5)  []Angina pectoris (I20)  []Atherosclerosis (I70)  []CVA Musculoskeletal conditions   []Disc pathology   []Congenital spine pathologies   [x]Prior surgical intervention  []Osteoporosis (M81.8)  []Osteopenia (M85.8)   Endocrine conditions   []Hypothyroid (E03.9)  []Hyperthyroid Gastrointestinal conditions   []Constipation (L68.12)   Metabolic conditions   []Morbid obesity (E66.01)  []Diabetes type 1(E10.65) or 2 (E11.65)   []Neuropathy (G60.9)     Pulmonary conditions   []Asthma (J45)  []Coughing   []COPD (J44.9)   Psychological Disorders  []Anxiety (F41.9)  []Depression (F32.9)   []Other:   []Other:          Barriers to/and or personal factors that will affect rehab potential:              []Age  []Sex   []Smoker              []Motivation/Lack of Motivation                        []Co-Morbidities              []Cognitive Function, education/learning barriers              []Environmental, home barriers              []profession/work barriers  []past PT/medical experience  []other:  Justification:     Falls Risk Assessment (30 days):   [x] Falls Risk assessed and no intervention required. [] Falls Risk assessed and Patient requires intervention due to being higher risk   TUG score (>12s at risk):     [] Falls education provided, including         ASSESSMENT: Patient presents today with s/s consitent with dx, extensor carpi radialis longus appears to be the most limited, ECRB was strong and pain free. Thoracic rotation is guarded throughout with limited rib expansion with inhalation.     Functional Impairments:     [x]Noted spinal or UE joint hypomobility   []Noted spinal or UE joint hypermobility   [x]Decreased spinal/UE functional ROM   []Abnormal reflexes/sensation/myotomal/dermatomal deficits   [x]Decreased RC/scapular/core strength and neuromuscular control    [x]Decreased UE functional strength   []other:      Functional Activity Limitations (from functional questionnaire and intake)   []Reduced ability to tolerate prolonged functional positions   []Reduced ability or difficulty with changes of positions or transfers between positions   []Reduced ability to maintain good posture and demonstrate good body mechanics with sitting, bending, and lifting   [x] Reduced ability or tolerance with driving and/or computer work   [x]Reduced ability to perform lifting, reaching, carrying tasks   []Reduced ability to reach behind back   []Reduced ability to sleep    [x]Reduced ability to tolerate any impact through UE or spine   [x]Reduced ability to  or hold objects   [x]Reduced ability to throw or toss an object   []other:    Participation Restrictions   []Reduced participation in self care activities   [x]Reduced participation in home management activities   [x]Reduced participation in work activities   [x]Reduced participation in social activities. [x]Reduced participation in sport/recreational activities. Classification/Subgrouping:   []signs/symptoms consistent with post-surgical status including decreased ROM, strength and function.     []signs/symptoms consistent with joint sprain/strain    []signs/symptoms consistent with shoulder impingement (internal, external, primary or secondary)   [x]signs/symptoms consistent with shoulder/elbow/wrist tendinopathy   []Signs/symptoms consistent with Rotator cuff tear   []sign/symptoms consistent with labral tear   [x]signs/symptoms consistent with rib dysfunction   []signs/symptoms consistent with postural dysfunction   []signs/symptoms consistent with Glenohumeral IR Deficit - <45 degrees   []signs/symptoms consistent with facet dysfunction of cervical/thoracic spine   [x]signs/symptoms consistent with pathology which may benefit from Dry Needling   []signs/symptoms which may limit the use of advanced manual therapy techniques: (Elevated CV risk profile, recent trauma, intolerance to end range positions, prior TIA, visual issues, UE neurological compromise )     Prognosis/Rehab Potential:      []Excellent   [x]Good    []Fair   []Poor    Tolerance of evaluation/treatment:    []Excellent   [x]Good    []Fair   []Poor    Physical Therapy Evaluation Complexity Justification  [x] A history of present problem with:  [x] no personal factors and/or comorbidities that impact the plan of care;  []1-2 personal factors and/or comorbidities that impact the plan of care  []3 personal factors and/or comorbidities that impact the plan of care  [x] An examination of body systems using standardized tests and measures addressing any of the following: body structures and functions (impairments), activity limitations, and/or participation restrictions;:  [x] a total of 1-2 or more elements   [] a total of 3 or more elements   [] a total of 4 or more elements   [x] A clinical presentation with:  [x] stable and/or uncomplicated characteristics   [] evolving clinical presentation with changing characteristics  [] unstable and unpredictable characteristics;   [x] Clinical decision making of [x] low, [] moderate, [] high complexity using standardized patient assessment instrument and/or measurable assessment of functional outcome. [x] EVAL (LOW) 70260 (typically 20 minutes face-to-face)  [] EVAL (MOD) 62695 (typically 30 minutes face-to-face)  [] EVAL (HIGH) 94692 (typically 45 minutes face-to-face)  [] RE-EVAL     PLAN:    Frequency/Duration:  1-2 days per week for 8 Weeks:  Interventions:  [x]  Therapeutic exercise including: strength training, ROM, for scapula, core and Upper extremity, including postural re-education. [x]  NMR activation and proprioception for UE, periscapular and RC muscles and Core, including postural re-education. [x]  Manual therapy as indicated for shoulder, scapula, spine and associated soft tissue including: Dry Needling/IASTM, STM, PROM, Gr I-IV mobilizations, manipulation. [x] Modalities as needed that may include: thermal agents, E-stim, Biofeedback, US, iontophoresis as indicated  [x] Patient education on joint protection, postural re-education, activity modification, progression of HEP. HEP instruction: elbow isometrics/open book(see scanned forms)    GOALS:  Patient stated goal: to return to basketball  [] Progressing: [] Met: [] Not Met: [] Adjusted    Therapist goals for Patient:   Short Term Goals: To be achieved in: 2 weeks  1. Independent in HEP and progression per patient tolerance, in order to prevent re-injury. [] Progressing: [] Met: [] Not Met: [] Adjusted  2.  Patient will have a decrease in pain to facilitate improvement in movement, function, and ADLs as indicated by Functional Deficits. [] Progressing: [] Met: [] Not Met: [] Adjusted    Long Term Goals: To be achieved in: 8 weeks  1. Disability index score of 70 or more on FOTO to assist with reaching prior level of function. [] Progressing: [] Met: [] Not Met: [] Adjusted  2. Patient will demonstrate increased AROM to R elbow and thoracic pain  to allow for proper joint functioning as indicated by Functional Deficits. [] Progressing: [] Met: [] Not Met: [] Adjusted  3. Patient will demonstrate an increase in NM recruitment/activation and overall GH and scapular strength to within n5lbs HHD or WNL for proper functional mobility as indicated by patients Functional Deficits. [] Progressing: [] Met: [] Not Met: [] Adjusted  4. Patient will return to eating without pain   activities without increased symptoms or restriction. [] Progressing: [] Met: [] Not Met: [] Adjusted  5.  No pain with lifting coffee cup(patient specific functional goal)     [] Progressing: [] Met: [] Not Met: [] Adjusted    Electronically signed by:  Jacobo Mills PT

## 2022-11-22 NOTE — FLOWSHEET NOTE
[facility] [address]  Phone: [phone] Fax: [phone]    Physical Therapy Treatment Note/ Progress Report:       Date:  2022    Patient Name:  Bertha Ramos    :  1960  MRN: [id number]  Restrictions/Precautions:    Medical/Treatment Diagnosis Information:    M25.521 Pain in right elbow/epicondylar mass injury/mid back pain    M25.521 Pain in right elbow/epicondylar mass injury/mid back pain  Insurance/Certification information:     Physician Information:  Yonatan Sepulveda DO  Plan of care signed (Y/N):     Date of Patient follow up with Physician:      Progress Report: []  Yes  []  No     Date Range for reporting period:  Beginning:   Ending:     Progress report due (10 Rx/or 30 days whichever is QENL):     Recertification due (POC duration/ or 90 days whichever is less):       Visit # Insurance Allowable Auth Needed   1 ?  []Yes    []No     Pain level:  0-7/10     SUBJECTIVE:  See eval    OBJECTIVE: See eval  Observation:   Test measurements:      RESTRICTIONS/PRECAUTIONS: R elbow flex with arm pronated    Exercises/Interventions:   Therapeutic Ex (20330)  Min: Sets/sec Reps CUES/Notes   UBE      Pendulum/Ball rolls      Cane AAROM flex/press      3 way Isomet      T- band Row/pinch      T- band lower pinch      T- band ER activation      Supine SA punch      SL ER/SL punch      Prone Rows/ext      Prone HAB/Prone Flex      Seat Table slides/Wall Slides      Seated HH Depression      No Money      Scap Wall Lat touches/wall walks            Standing flex/scap      Standing Punch      Lawnmower                  Isometrics  5 sec 12    Open book 1 10    Manual Intervention  (44740)  Min:      Shld /GH Mobs      Post Cap mobs      Thoracic/Rib manipualtion      CT MT/Mobs      PROM MT      Elbow mobs      GISTM 12 min  R common ext   NMR re-education (58503)  Min:      T-spine Ext      GH depress/compress      [address] NMR      Body blade      Wall ball roll      Wall Ball bounce      Ball drops      Mari Scap Bio      Floor Snow angels-sliders            Therapeutic Activity (99034)  Min:      UE throwing porgression      Dynamic UE stability      Earthquake Bar      Bodyblade                Therapeutic Exercise and NMR EXR  [x] (57172) Provided verbal/tactile cueing for activities related to strengthening, flexibility, endurance, ROM  for improvements in scapular, scapulothoracic and UE control with self care, reaching, carrying, lifting, house/yardwork, driving/computer work. [x] (19701) Provided verbal/tactile cueing for activities related to improving balance, coordination, kinesthetic sense, posture, motor skill, proprioception  to assist with  scapular, scapulothoracic and UE control with self care, reaching, carrying, lifting, house/yardwork, driving/computer work. Therapeutic Activities:    [] (08093 or 43121) Provided verbal/tactile cueing for activities related to improving balance, coordination, kinesthetic sense, posture, motor skill, proprioception and motor activation to allow for proper function of scapular, scapulothoracic and UE control with self care, carrying, lifting, driving/computer work.      Home Exercise Program:    [x] (45800) Reviewed/Progressed HEP activities related to strengthening, flexibility, endurance, ROM of scapular, scapulothoracic and UE control with self care, reaching, carrying, lifting, house/yardwork, driving/computer work  [] (68261) Reviewed/Progressed HEP activities related to improving balance, coordination, kinesthetic sense, posture, motor skill, proprioception of scapular, scapulothoracic and UE control with self care, reaching, carrying, lifting, house/yardwork, driving/computer work      Manual Treatments:  PROM / STM / Oscillations-Mobs:  G-I, II, III, IV (PA's, Inf., Post.)  [] (40037) Provided manual therapy to mobilize soft tissue/joints of cervical/CT, scapular GHJ and UE for the purpose of modulating pain, promoting relaxation,  increasing ROM, reducing/eliminating soft tissue swelling/inflammation/restriction, improving soft tissue extensibility and allowing for proper ROM for normal function with self care, reaching, carrying, lifting, house/yardwork, driving/computer work    Modalities:      Charges:  Timed Code Treatment Minutes: 18   Total Treatment Minutes: 50       [x] EVAL (LOW) 90398 (typically 20 minutes face-to-face)  [] EVAL (MOD) 14268 (typically 30 minutes face-to-face)  [] EVAL (HIGH) 57312 (typically 45 minutes face-to-face)  [] RE-EVAL     [] NK(23148) x     [] DRY NEEDLE 1 OR 2 MUSCLES  [] NMR (65941) x     [] DRY NEEDLE 3+ MUSCLES  [x] Manual (51963) x       [] TA (23864) x     [] Mech Traction (94250)  [] ES(attended) (04472)     [] ES (un) (49186):   [] VASO (90896)  [] Other:    If [facility] Please Indicate Time In/Out  CPT Code Time in Time out                                   GOALS:    GOALS:  Patient stated goal: to return to basketball  [] Progressing: [] Met: [] Not Met: [] Adjusted    Therapist goals for Patient:   Short Term Goals: To be achieved in: 2 weeks  1. Independent in HEP and progression per patient tolerance, in order to prevent re-injury. [] Progressing: [] Met: [] Not Met: [] Adjusted  2. Patient will have a decrease in pain to facilitate improvement in movement, function, and ADLs as indicated by Functional Deficits. [] Progressing: [] Met: [] Not Met: [] Adjusted    Long Term Goals: To be achieved in: 8 weeks  1. Disability index score of 70 or more on FOTO to assist with reaching prior level of function. [] Progressing: [] Met: [] Not Met: [] Adjusted  2. Patient will demonstrate increased AROM to R elbow and thoracic pain  to allow for proper joint functioning as indicated by Functional Deficits. [] Progressing: [] Met: [] Not Met: [] Adjusted  3.  Patient will demonstrate an increase in NM recruitment/activation and overall GH and scapular strength to within n5lbs HHD or WNL for proper functional mobility as indicated by patients Functional Deficits. [] Progressing: [] Met: [] Not Met: [] Adjusted  4. Patient will return to eating without pain   activities without increased symptoms or restriction. [] Progressing: [] Met: [] Not Met: [] Adjusted  5. No pain with lifting coffee cup(patient specific functional goal)     [] Progressing: [] Met: [] Not Met: [] Adjusted    ASSESSMENT:  See eval    Return to Play: (if applicable)   []  Stage 1: Intro to Strength   []  Stage 2: Dynamic Strength and Intro to Plyometrics   []  Stage 3: Advanced Plyometrics and Intro to Throwing   []  Stage 4: Sport specific Training/Return to Sport     []  Ready to Return to Play, [employer]   []  Not Ready for Return to Sports   Comments:      Treatment/Activity Tolerance:  [x] Patient tolerated treatment well [] Patient limited by fatique  [] Patient limited by pain  [] Patient limited by other medical complications  [] Other:     Overall Progression Towards Functional goals/ Treatment Progress Update:  [] Patient is progressing as expected towards functional goals listed. [] Progression is slowed due to complexities/Impairments listed. [] Progression has been slowed due to co-morbidities.   [x] Plan just implemented, too soon to assess goals progression <30days   [] Goals require adjustment due to lack of progress  [] Patient is not progressing as expected and requires additional follow up with physician  [] Other    Prognosis for POC: [x] Good [] Fair  [] Poor    Patient requires continued skilled intervention: [x] Yes  [] No      PLAN: See eval  [] Continue per plan of care [] Alter current plan (see comments)  [x] Plan of care initiated [] Hold pending MD visit [] Discharge    Electronically signed by: Tyronne Closs, PT     Note: If patient does not return for scheduled/recommended follow up visits, this note will serve as a discharge from care along with the most recent update on progress.

## 2022-11-29 ENCOUNTER — HOSPITAL ENCOUNTER (OUTPATIENT)
Dept: PHYSICAL THERAPY | Age: 62
Setting detail: THERAPIES SERIES
Discharge: HOME | End: 2022-11-29
Payer: COMMERCIAL

## 2022-11-29 NOTE — FLOWSHEET NOTE
[facility]    Physical Therapy  Cancellation/No-show Note  Patient Name:  Dannie Robb  :  1960   Date:  2022  Cancelled visits to date: 1  No-shows to date: 0    For today's appointment patient:  [x]  Cancelled  []  Rescheduled appointment  []  No-show     Reason given by patient:  []  Patient ill  []  Conflicting appointment  []  No transportation    []  Conflict with work  []  No reason given  []  Other:     Comments:  Patient is having MRI on R elbow    Electronically signed by:  Merlene Boo, PT, PT

## 2022-12-06 ENCOUNTER — HOSPITAL ENCOUNTER (OUTPATIENT)
Dept: PHYSICAL THERAPY | Age: 62
Setting detail: THERAPIES SERIES
Discharge: HOME | End: 2022-12-06

## 2022-12-06 NOTE — FLOWSHEET NOTE
[facility]    Physical Therapy  Cancellation/No-show Note  Patient Name:  Carmen Cervantes  :  1960   Date:  2022  Cancelled visits to date: 1  No-shows to date: 1    For today's appointment patient:  []  Cancelled  []  Rescheduled appointment  [x]  No-show     Reason given by patient:  []  Patient ill  []  Conflicting appointment  []  No transportation    []  Conflict with work  []  No reason given  []  Other:     Comments:      Electronically signed by:  Daryle Self, PT, DPT

## 2023-02-14 ENCOUNTER — HOSPITAL ENCOUNTER (OUTPATIENT)
Dept: PHYSICAL THERAPY | Age: 63
Setting detail: THERAPIES SERIES
Discharge: HOME | End: 2023-02-14
Payer: COMMERCIAL

## 2023-02-14 PROCEDURE — 97161 PT EVAL LOW COMPLEX 20 MIN: CPT

## 2023-02-14 NOTE — PLAN OF CARE
[facility]  Phone: [phone]   Fax:     [phone]                                                       Physical Therapy Certification    Dear Shahbaz Wheeler MD,    We had the pleasure of evaluating the following patient for physical therapy services at [address] [place] [address]. A summary of our findings can be found in the initial assessment below. This includes our plan of care. If you have any questions or concerns regarding these findings, please do not hesitate to contact me at the office phone number checked above. Thank you for the referral.       Physician Signature:_______________________________Date:__________________  By signing above (or electronic signature), therapists plan is approved by physician              Patient: Lazarus Grizzle   : 1960   MRN: [id number]  Referring Physician: Shahbaz Wheeler MD      Evaluation Date: 2023    Medical Diagnosis Information:  Diagnosis: R bicep tendon rupture, R elbow pain   Treatment Diagnosis: R bicep tendon rupture M66.821, R elbow pain M25.521                                         Insurance information: PT Insurance Information: Joanna    Precautions/ Contra-indications: post op distal bicep repair  Latex Allergy:   [x]  NO      []YES  Preferred Language for Healthcare:   [x]English       []other:    C-SSRS Triggered by Intake questionnaire (Past 2 wk assessment ):   [x] No, Questionnaire did not trigger screening.   [] Yes, Patient intake triggered C-SSRS Screening      [] C-SSRS Screening completed  [] PCP notified via Epic     SUBJECTIVE: Patient stated complaint:Patient reports that back in October he was moving an appliance and it shifted quickly and he went to stabilize with his shoulder/elbow and had a pop sensation. Went to MD and had some PT which didn't change much with symptoms.  Had follow up MRI which showed biceps rupture. Patient had surgery for bicep repair in December and is now 6 weeks post op. Patient is now out of sling and had follow up with MD last week. MD said no basketball until May at the earliest. Has a farm in [place] that he will be going to toward the end of May as well. Additionally, patient recently had COVID and tested + for Lyme disease which is leaving him very fatigued at this time.      Relevant Medical History: Recent COVID and Lyme disease (still on doxycycline medication for total of 21 days - about 10 days in)  Functional Scale/Score: Quick Dash 34% disability, 100% disability sports    Pain Scale: 1/10  Easing factors: resting  Provocative factors: hasn't been using it yet     Type: []Constant   [x]Intermittent  []Radiating []Localized []other:     Numbness/Tingling: none    Occupation/School:ornithologist, professor      Living Status/Prior Level of Function: Independent with ADLs and IADLs, playing basketball, working at his [place] farm, birding/tagging birds    OBJECTIVE:     ROM Left Right   Shoulder Flex 165 165   Shoulder Abd 165 165   Elbow flexion 135 141   Elbow extension 0 0   Wrist flexion 80 65   Wrist extension 70 65   Strength  Left Right   Shoulder Flex 4+ 4+   Elbow extension 4+ 4   Elbow flexion 4+ 3+   Wrist flexion 4+ 3+   Wrist extension 4+ 3+    strength 90 lb 70 lb      Reflexes Normal Abnormal Comments   [x]ALL NORMAL            S1-2 Seated achilles [] []    S1-2 Prone knee bend [] []    L3-4 Patellar tendon [] []    C5-6 Biceps [] []    C6 Brachioradialis [] []    C7-8 Triceps [] []    Clonus [] []    Babinski [] []    Saleh's [] []      Reflexes/Sensation:    [x]Dermatomes/Myotomes intact    [x]Reflexes equal and normal bilaterally   []Other:    Joint mobility:    [x]Normal    []Hypo   []Hyper    Palpation: mild tenderness along incision, tender in flexor tendons in forearm    Functional Mobility/Transfers: limited in using arm for ADLs/IADLs, such as playing basketball, working on farm, working with birds    Posture: WNL    Bandages/Dressings/Incisions: healing horizontal incision just distal to elbow     Gait: (include devices/WB status): WNL     Orthopedic Special Tests: n/a                       [x] Patient history, allergies, meds reviewed. Medical chart reviewed. See intake form. Review Of Systems (ROS):  [x]Performed Review of systems (Integumentary, CardioPulmonary, Neurological) by intake and observation. Intake form has been scanned into medical record. Patient has been instructed to contact their primary care physician regarding ROS issues if not already being addressed at this time.       Co-morbidities/Complexities (which will affect course of rehabilitation):   []None           Arthritic conditions   []Rheumatoid arthritis (M05.9)  []Osteoarthritis (M19.91)   Cardiovascular conditions   [x]Hypertension (I10)  []Hyperlipidemia (E78.5)  []Angina pectoris (I20)  []Atherosclerosis (I70)  []CVA Musculoskeletal conditions   []Disc pathology   []Congenital spine pathologies   []Prior surgical intervention  []Osteoporosis (M81.8)  []Osteopenia (M85.8)   Endocrine conditions   []Hypothyroid (E03.9)  []Hyperthyroid Gastrointestinal conditions   []Constipation (C61.42)   Metabolic conditions   []Morbid obesity (E66.01)  []Diabetes type 1(E10.65) or 2 (E11.65)   []Neuropathy (G60.9)     Pulmonary conditions   []Asthma (J45)  []Coughing   []COPD (J44.9)   Psychological Disorders  []Anxiety (F41.9)  []Depression (F32.9)   []Other:   [x]Other:   recent COVID and current lyme disease       Barriers to/and or personal factors that will affect rehab potential:              []Age  []Sex   []Smoker              []Motivation/Lack of Motivation                        []Co-Morbidities              []Cognitive Function, education/learning barriers              []Environmental, home barriers              []profession/work barriers  []past PT/medical experience  []other:  Justification:     Falls Risk Assessment (30 days):   [x] Falls Risk assessed and no intervention required. [] Falls Risk assessed and Patient requires intervention due to being higher risk   TUG score (>12s at risk):     [] Falls education provided, including         ASSESSMENT: Patient is a 66 yo male who presents to therapy s/p R bicep tendon repair approximately 6 weeks ago. Patient with near full ROM, slight limitation in supination and pronation, as well as wrist flexion/extension. Patient with decreased overall strength and NM control of R UE. Patient will benefit from further skilled PT services to address noted deficits.      Functional Impairments:     []Noted spinal or UE joint hypomobility   []Noted spinal or UE joint hypermobility   [x]Decreased spinal/UE functional ROM   []Abnormal reflexes/sensation/myotomal/dermatomal deficits   [x]Decreased RC/scapular/core strength and neuromuscular control    [x]Decreased UE functional strength   []other:      Functional Activity Limitations (from functional questionnaire and intake)   []Reduced ability to tolerate prolonged functional positions   []Reduced ability or difficulty with changes of positions or transfers between positions   []Reduced ability to maintain good posture and demonstrate good body mechanics with sitting, bending, and lifting   [x] Reduced ability or tolerance with driving and/or computer work   [x]Reduced ability to perform lifting, reaching, carrying tasks   [x]Reduced ability to reach behind back   []Reduced ability to sleep    [x]Reduced ability to tolerate any impact through UE or spine   [x]Reduced ability to  or hold objects   [x]Reduced ability to throw or toss an object   []other:    Participation Restrictions   []Reduced participation in self care activities   []Reduced participation in home management activities   [x]Reduced participation in work activities   [x]Reduced participation in social activities. []Reduced participation in sport/recreational activities. Classification/Subgrouping:   [x]signs/symptoms consistent with post-surgical status including decreased ROM, strength and function.     []signs/symptoms consistent with joint sprain/strain    []signs/symptoms consistent with shoulder impingement (internal, external, primary or secondary)   []signs/symptoms consistent with shoulder/elbow/wrist tendinopathy   []Signs/symptoms consistent with Rotator cuff tear   []sign/symptoms consistent with labral tear   []signs/symptoms consistent with rib dysfunction   []signs/symptoms consistent with postural dysfunction   []signs/symptoms consistent with Glenohumeral IR Deficit - <45 degrees   []signs/symptoms consistent with facet dysfunction of cervical/thoracic spine   []signs/symptoms consistent with pathology which may benefit from Dry Needling   []signs/symptoms which may limit the use of advanced manual therapy techniques: (Elevated CV risk profile, recent trauma, intolerance to end range positions, prior TIA, visual issues, UE neurological compromise )     Prognosis/Rehab Potential:      []Excellent   [x]Good    []Fair   []Poor    Tolerance of evaluation/treatment:    []Excellent   [x]Good    []Fair   []Poor    Physical Therapy Evaluation Complexity Justification  [x] A history of present problem with:  [x] no personal factors and/or comorbidities that impact the plan of care;  []1-2 personal factors and/or comorbidities that impact the plan of care  []3 personal factors and/or comorbidities that impact the plan of care  [x] An examination of body systems using standardized tests and measures addressing any of the following: body structures and functions (impairments), activity limitations, and/or participation restrictions;:  [x] a total of 1-2 or more elements   [] a total of 3 or more elements   [] a total of 4 or more elements   [x] A clinical presentation with:  [x] stable and/or uncomplicated characteristics   [] evolving clinical presentation with changing characteristics  [] unstable and unpredictable characteristics;   [x] Clinical decision making of [x] low, [] moderate, [] high complexity using standardized patient assessment instrument and/or measurable assessment of functional outcome. [x] EVAL (LOW) 82986 (typically 20 minutes face-to-face)  [] EVAL (MOD) 11982 (typically 30 minutes face-to-face)  [] EVAL (HIGH) 19558 (typically 45 minutes face-to-face)  [] RE-EVAL     PLAN:   Frequency/Duration:  2 days per week for 6 Weeks:  Interventions:  [x]  Therapeutic exercise including: strength training, ROM, for scapula, core and Upper extremity, including postural re-education. [x]  NMR activation and proprioception for UE, periscapular and RC muscles and Core, including postural re-education. [x]  Manual therapy as indicated for shoulder, scapula, spine and associated soft tissue including: Dry Needling/IASTM, STM, PROM, Gr I-IV mobilizations, manipulation. [x] Modalities as needed that may include: thermal agents, E-stim, Biofeedback, US, iontophoresis as indicated  [x] Patient education on joint protection, postural re-education, activity modification, progression of HEP. HEP instruction: wrist extension/flexion stretch and supination/pronation stretch (see scanned forms)    GOALS:  Patient stated goal: able to use arm again, return to [address] [place] [address]   [] Progressing: [] Met: [] Not Met: [] Adjusted    Therapist goals for Patient:   Short Term Goals: To be achieved in: 2 weeks  1. Independent in HEP and progression per patient tolerance, in order to prevent re-injury. [] Progressing: [] Met: [] Not Met: [] Adjusted  2. Patient will have a decrease in pain to facilitate improvement in movement, function, and ADLs as indicated by Functional Deficits. [] Progressing: [] Met: [] Not Met: [] Adjusted    Long Term Goals: To be achieved in: 6 weeks  1.  Disability index score of 0% or less for the Quick DASH ADLs and sports to assist with reaching prior level of function. [] Progressing: [] Met: [] Not Met: [] Adjusted  2. Patient will demonstrate increased AROM to full supination of forearm to allow for proper joint functioning as indicated by Functional Deficits. [] Progressing: [] Met: [] Not Met: [] Adjusted  3. Patient will demonstrate an increase in NM recruitment/activation and overall GH and scapular strength to within n5lbs HHD or WNL for proper functional mobility as indicated by patients Functional Deficits. [] Progressing: [] Met: [] Not Met: [] Adjusted  4. Patient will return to lifting/reaching/gripping  activities without increased symptoms or restriction. [] Progressing: [] Met: [] Not Met: [] Adjusted  5.  Patient will report being able to play Bball and perform all birding work tasks without increased symptoms or restriction(patient specific functional goal)     [] Progressing: [] Met: [] Not Met: [] Adjusted    Electronically signed by:  Anuradha Sanchez PT

## 2023-02-14 NOTE — FLOWSHEET NOTE
[facility]  [address] [place]  Phone: [phone] Fax: [phone]    Physical Therapy Treatment Note/ Progress Report:       Date:  2023    Patient Name:  Cheri Richard    :  1960  MRN: [id number]  Restrictions/Precautions:    Medical/Treatment Diagnosis Information:  Diagnosis: R bicep tendon rupture, R elbow pain  Treatment Diagnosis: R bicep tendon rupture M66.821, R elbow pain C29.261  Insurance/Certification information:  PT Insurance Information: [place]  Physician Information:  Yris Lindsey MD  Plan of care signed (Y/N):     Date of Patient follow up with Physician:      Progress Report: [x]  Yes  []  No     Date Range for reporting period:  Beginnin2023  Ending:     Progress report due (10 Rx/or 30 days whichever is less): 3/34/8523     Recertification due (POC duration/ or 90 days whichever is less): 2023     Visit # Insurance Allowable Auth Needed   1 [place]/[employer] (needs auth) [x]Yes    []No     Pain level:  0/10     SUBJECTIVE:  See eval    OBJECTIVE: See eval  Observation:   Test measurements:      RESTRICTIONS/PRECAUTIONS: distal bicep repair 2022 - currently 6 weeks post op    Exercises/Interventions:   Therapeutic Ex (90980)  Min: Sets/sec Reps CUES/Notes   UBE      Pendulum/Ball rolls      Cane AAROM flex/press      3 way Isomet      T- band Row/pinch      T- band lower pinch      T- band ER activation      Supine SA punch      SL ER/SL punch      Prone Rows/ext      Prone HAB/Prone Flex      Seat Table slides/Wall Slides      Seated HH Depression      No Money      Scap Wall Lat touches/wall walks            Standing flex/scap      Standing Punch      Lawnmower                              Manual Intervention  (45978)  Min:      Elbow STM and stretching  15    Post Cap mobs      Thoracic/Rib manipualtion      CT MT/Mobs      PROM MT      Elbow mobs            NMR re-education (25196)  Min:      T-spine Ext      GH depress/compress      Scap/GH NMR      Body blade      Wall ball roll      Wall Ball bounce      Ball drops      Mari Scap Bio      Floor Snow angels-sliders            Therapeutic Activity (69456)  Min:      UE throwing porgression      Dynamic UE stability      Earthquake Bar      Bodyblade                Therapeutic Exercise and NMR EXR  [x] (34043) Provided verbal/tactile cueing for activities related to strengthening, flexibility, endurance, ROM  for improvements in scapular, scapulothoracic and UE control with self care, reaching, carrying, lifting, house/yardwork, driving/computer work. [x] (19789) Provided verbal/tactile cueing for activities related to improving balance, coordination, kinesthetic sense, posture, motor skill, proprioception  to assist with  scapular, scapulothoracic and UE control with self care, reaching, carrying, lifting, house/yardwork, driving/computer work. Therapeutic Activities:    [] (13703 or 48744) Provided verbal/tactile cueing for activities related to improving balance, coordination, kinesthetic sense, posture, motor skill, proprioception and motor activation to allow for proper function of scapular, scapulothoracic and UE control with self care, carrying, lifting, driving/computer work.      Home Exercise Program:    [x] (10281) Reviewed/Progressed HEP activities related to strengthening, flexibility, endurance, ROM of scapular, scapulothoracic and UE control with self care, reaching, carrying, lifting, house/yardwork, driving/computer work  [] (85000) Reviewed/Progressed HEP activities related to improving balance, coordination, kinesthetic sense, posture, motor skill, proprioception of scapular, scapulothoracic and UE control with self care, reaching, carrying, lifting, house/yardwork, driving/computer work      Manual Treatments:  PROM / STM / Oscillations-Mobs:  G-I, II, III, IV (PA's, Inf., Post.)  [x] (53481) Provided manual therapy to mobilize soft tissue/joints of cervical/CT, scapular GHJ and UE for the purpose of modulating pain, promoting relaxation,  increasing ROM, reducing/eliminating soft tissue swelling/inflammation/restriction, improving soft tissue extensibility and allowing for proper ROM for normal function with self care, reaching, carrying, lifting, house/yardwork, driving/computer work    Modalities:      Charges:  Timed Code Treatment Minutes: 15   Total Treatment Minutes: 45       [x] EVAL (LOW) 84534 (typically 20 minutes face-to-face)  [] EVAL (MOD) 02992 (typically 30 minutes face-to-face)  [] EVAL (HIGH) 22176 (typically 45 minutes face-to-face)  [] RE-EVAL     [] GY(55654) x     [] DRY NEEDLE 1 OR 2 MUSCLES  [] NMR (84440) x     [] DRY NEEDLE 3+ MUSCLES  [] Manual (53531) x       [] TA (62836) x     [] Mech Traction (21749)  [] ES(attended) (50642)     [] ES (un) (59833):   [] VASO (87285)  [] Other:    If [facility] Please Indicate Time In/Out  CPT Code Time in Time out                                   GOALS:  Patient stated goal: able to use arm again, return to [address] [place] [address]   [] Progressing: [] Met: [] Not Met: [] Adjusted    Therapist goals for Patient:   Short Term Goals: To be achieved in: 2 weeks  1. Independent in HEP and progression per patient tolerance, in order to prevent re-injury. [] Progressing: [] Met: [] Not Met: [] Adjusted  2. Patient will have a decrease in pain to facilitate improvement in movement, function, and ADLs as indicated by Functional Deficits. [] Progressing: [] Met: [] Not Met: [] Adjusted    Long Term Goals: To be achieved in: 6 weeks  1. Disability index score of 0% or less for the Quick DASH ADLs and sports to assist with reaching prior level of function. [] Progressing: [] Met: [] Not Met: [] Adjusted  2. Patient will demonstrate increased AROM to full supination of forearm to allow for proper joint functioning as indicated by Functional Deficits.    [] Progressing: [] Met: [] Not Met: [] Adjusted  3. Patient will demonstrate an increase in NM recruitment/activation and overall GH and scapular strength to within n5lbs HHD or WNL for proper functional mobility as indicated by patients Functional Deficits. [] Progressing: [] Met: [] Not Met: [] Adjusted  4. Patient will return to lifting/reaching/gripping  activities without increased symptoms or restriction. [] Progressing: [] Met: [] Not Met: [] Adjusted  5. Patient will report being able to play [employer]arket and perform all birding work tasks without increased symptoms or restriction(patient specific functional goal)     [] Progressing: [] Met: [] Not Met: [] Adjusted    ASSESSMENT:  See eval    Return to Play: (if applicable)   []  Stage 1: Intro to Strength   []  Stage 2: Dynamic Strength and Intro to Plyometrics   []  Stage 3: Advanced Plyometrics and Intro to Throwing   []  Stage 4: Sport specific Training/Return to Sport     []  Ready to Return to Play, [employer]   []  Not Ready for Return to Sports   Comments:      Treatment/Activity Tolerance:  [x] Patient tolerated treatment well [] Patient limited by fatique  [] Patient limited by pain  [] Patient limited by other medical complications  [] Other:     Overall Progression Towards Functional goals/ Treatment Progress Update:  [] Patient is progressing as expected towards functional goals listed. [] Progression is slowed due to complexities/Impairments listed. [] Progression has been slowed due to co-morbidities.   [x] Plan just implemented, too soon to assess goals progression <30days   [] Goals require adjustment due to lack of progress  [] Patient is not progressing as expected and requires additional follow up with physician  [] Other    Prognosis for POC: [x] Good [] Fair  [] Poor    Patient requires continued skilled intervention: [x] Yes  [] No      PLAN: See eval  [] Continue per plan of care [] Alter current plan (see comments)  [x] Plan of care initiated [] Hold pending MD visit [] Discharge    Electronically signed by: Ke Hare PT     Note: If patient does not return for scheduled/recommended follow up visits, this note will serve as a discharge from care along with the most recent update on progress.

## 2023-02-16 ENCOUNTER — HOSPITAL ENCOUNTER (OUTPATIENT)
Dept: PHYSICAL THERAPY | Age: 63
Setting detail: THERAPIES SERIES
Discharge: HOME | End: 2023-02-16
Payer: COMMERCIAL

## 2023-02-16 PROCEDURE — 97140 MANUAL THERAPY 1/> REGIONS: CPT

## 2023-02-16 PROCEDURE — 97110 THERAPEUTIC EXERCISES: CPT

## 2023-02-16 NOTE — FLOWSHEET NOTE
[facility]  [address] [place]  Phone: [phone] Fax: [phone]    Physical Therapy Treatment Note/ Progress Report:       Date:  2023    Patient Name:  Yumiko Rendon    :  1960  MRN: [id number]  Restrictions/Precautions:    Medical/Treatment Diagnosis Information:  Diagnosis: R bicep tendon rupture, R elbow pain  Treatment Diagnosis: R bicep tendon rupture M66.821, R elbow pain O18.341  Insurance/Certification information:  PT Insurance Information: [place]  Physician Information:  Luca Newman MD  Plan of care signed (Y/N):     Date of Patient follow up with Physician:      Progress Report: [x]  Yes  []  No     Date Range for reporting period:  Beginnin2023  Ending:     Progress report due (10 Rx/or 30 days whichever is less): 3/86/4469     Recertification due (POC duration/ or 90 days whichever is less): 2023     Visit # Insurance Allowable Auth Needed   2 (1 of 11 approved thru 2023) [place]/[employer] (needs auth) [x]Yes    []No     Pain level:  0/10     SUBJECTIVE:  See eval    OBJECTIVE: See eval  Observation:   Test measurements:      RESTRICTIONS/PRECAUTIONS: distal bicep repair 2022 - currently 6 weeks post op    Exercises/Interventions:   Therapeutic Ex (33476)  Min: 30 Sets/sec Reps CUES/Notes   UBE 1 5 retro   Wrist flexion 2 10 3 lb   Wrist extension 2 10 3 lb   Forearm supination/pronation 2 10 3 lb   T- band Row/pinch      T- band lower pinch      Tricep extension 2 10 4 lb   Wall SA activation 2 10    Wall push up 2 10    Prone Rows/ext      Prone HAB/Prone Flex      Seat Table slides/Wall Slides      Seated HH Depression      No Money      Scap Wall Lat touches/wall walks      Wrist flexion and extension flex bar 2 10 Red flex bar   Yellow web  strength 2 10 Finger flexion and extension                                       Manual Intervention  (74336)  Min: 15      Elbow STM and stretching  15    Post Cap mobs      Thoracic/Rib manipualtion      CT MT/Mobs      PROM MT      Elbow mobs            NMR re-education (88226)  Min:      T-spine Ext      GH depress/compress      Scap/GH NMR      Body blade      Wall ball roll      Wall Ball bounce      Ball drops      Mari Scap Bio      Floor Snow angels-sliders            Therapeutic Activity (96351)  Min:      UE throwing porgression      Dynamic UE stability      Earthquake Bar      Bodyblade                Therapeutic Exercise and NMR EXR  [x] (50493) Provided verbal/tactile cueing for activities related to strengthening, flexibility, endurance, ROM  for improvements in scapular, scapulothoracic and UE control with self care, reaching, carrying, lifting, house/yardwork, driving/computer work. [x] (54265) Provided verbal/tactile cueing for activities related to improving balance, coordination, kinesthetic sense, posture, motor skill, proprioception  to assist with  scapular, scapulothoracic and UE control with self care, reaching, carrying, lifting, house/yardwork, driving/computer work. Therapeutic Activities:    [] (56115 or 05481) Provided verbal/tactile cueing for activities related to improving balance, coordination, kinesthetic sense, posture, motor skill, proprioception and motor activation to allow for proper function of scapular, scapulothoracic and UE control with self care, carrying, lifting, driving/computer work.      Home Exercise Program:    [x] (02070) Reviewed/Progressed HEP activities related to strengthening, flexibility, endurance, ROM of scapular, scapulothoracic and UE control with self care, reaching, carrying, lifting, house/yardwork, driving/computer work  [] (58882) Reviewed/Progressed HEP activities related to improving balance, coordination, kinesthetic sense, posture, motor skill, proprioception of scapular, scapulothoracic and UE control with self care, reaching, carrying, lifting, house/yardwork, driving/computer work      Manual Treatments:  PROM / STM / Oscillations-Mobs:  G-I, II, III, IV (PA's, Inf., Post.)  [x] (74191) Provided manual therapy to mobilize soft tissue/joints of cervical/CT, scapular GHJ and UE for the purpose of modulating pain, promoting relaxation,  increasing ROM, reducing/eliminating soft tissue swelling/inflammation/restriction, improving soft tissue extensibility and allowing for proper ROM for normal function with self care, reaching, carrying, lifting, house/yardwork, driving/computer work    Modalities:      Charges:  Timed Code Treatment Minutes: 45   Total Treatment Minutes: 45       [] EVAL (LOW) 01507 (typically 20 minutes face-to-face)  [] EVAL (MOD) 86407 (typically 30 minutes face-to-face)  [] EVAL (HIGH) 80783 (typically 45 minutes face-to-face)  [] RE-EVAL     [x] GD(86116) x   2  [] DRY NEEDLE 1 OR 2 MUSCLES  [] NMR (89889) x     [] DRY NEEDLE 3+ MUSCLES  [x] Manual (27449) x   1    [] TA (94410) x     [] Mech Traction (71781)  [] ES(attended) (24488)     [] ES (un) (55843):   [] VASO (52204)  [] Other:    If [facility] Please Indicate Time In/Out  CPT Code Time in Time out                                   GOALS:  Patient stated goal: able to use arm again, return to [address] [place] [address]   [] Progressing: [] Met: [] Not Met: [] Adjusted    Therapist goals for Patient:   Short Term Goals: To be achieved in: 2 weeks  1. Independent in HEP and progression per patient tolerance, in order to prevent re-injury. [] Progressing: [] Met: [] Not Met: [] Adjusted  2. Patient will have a decrease in pain to facilitate improvement in movement, function, and ADLs as indicated by Functional Deficits. [] Progressing: [] Met: [] Not Met: [] Adjusted    Long Term Goals: To be achieved in: 6 weeks  1. Disability index score of 0% or less for the Quick DASH ADLs and sports to assist with reaching prior level of function. [] Progressing: [] Met: [] Not Met: [] Adjusted  2.  Patient will demonstrate increased AROM to full supination of forearm to allow for proper joint functioning as indicated by Functional Deficits. [] Progressing: [] Met: [] Not Met: [] Adjusted  3. Patient will demonstrate an increase in NM recruitment/activation and overall GH and scapular strength to within n5lbs HHD or WNL for proper functional mobility as indicated by patients Functional Deficits. [] Progressing: [] Met: [] Not Met: [] Adjusted  4. Patient will return to lifting/reaching/gripping  activities without increased symptoms or restriction. [] Progressing: [] Met: [] Not Met: [] Adjusted  5. Patient will report being able to play [employer] and perform all birding work tasks without increased symptoms or restriction(patient specific functional goal)     [] Progressing: [] Met: [] Not Met: [] Adjusted    ASSESSMENT:  Tolerated all STM to forearm. Good tolerance to all light wrist, forearm and shoulder strengthening today. Needing cues for form with shoulder activation. Fatigued at conclusion. Return to Play: (if applicable)   []  Stage 1: Intro to Strength   []  Stage 2: Dynamic Strength and Intro to Plyometrics   []  Stage 3: Advanced Plyometrics and Intro to Throwing   []  Stage 4: Sport specific Training/Return to Sport     []  Ready to Return to Play, [employer]   []  Not Ready for Return to Sports   Comments:      Treatment/Activity Tolerance:  [x] Patient tolerated treatment well [] Patient limited by fatique  [] Patient limited by pain  [] Patient limited by other medical complications  [] Other:     Overall Progression Towards Functional goals/ Treatment Progress Update:  [] Patient is progressing as expected towards functional goals listed. [] Progression is slowed due to complexities/Impairments listed. [] Progression has been slowed due to co-morbidities.   [x] Plan just implemented, too soon to assess goals progression <30days   [] Goals require adjustment due to lack of progress  [] Patient is not progressing as expected and requires additional follow up with physician  [] Other    Prognosis for POC: [x] Good [] Fair  [] Poor    Patient requires continued skilled intervention: [x] Yes  [] No      PLAN: See eval  [] Continue per plan of care [] Alter current plan (see comments)  [x] Plan of care initiated [] Hold pending MD visit [] Discharge    Electronically signed by: Almas Rios, PT     Note: If patient does not return for scheduled/recommended follow up visits, this note will serve as a discharge from care along with the most recent update on progress.

## 2023-02-21 ENCOUNTER — HOSPITAL ENCOUNTER (OUTPATIENT)
Dept: PHYSICAL THERAPY | Age: 63
Setting detail: THERAPIES SERIES
Discharge: HOME | End: 2023-02-21
Payer: COMMERCIAL

## 2023-02-21 PROCEDURE — 97110 THERAPEUTIC EXERCISES: CPT

## 2023-02-21 PROCEDURE — 97140 MANUAL THERAPY 1/> REGIONS: CPT

## 2023-02-21 NOTE — FLOWSHEET NOTE
[facility] [address]  Phone: [phone] Fax: [phone]    Physical Therapy Treatment Note/ Progress Report:       Date:  2023    Patient Name:  Ulysses Snyder    :  1960  MRN: [id number]  Restrictions/Precautions:    Medical/Treatment Diagnosis Information:  Diagnosis: R bicep tendon rupture, R elbow pain  Treatment Diagnosis: R bicep tendon rupture M66.821, R elbow pain K46.379  Insurance/Certification information:  PT Insurance Information: [employer]  Physician Information:  Zofia Sams MD  Plan of care signed (Y/N):     Date of Patient follow up with Physician:      Progress Report: [x]  Yes  []  No     Date Range for reporting period:  Beginnin2023  Ending:     Progress report due (10 Rx/or 30 days whichever is less):      Recertification due (POC duration/ or 90 days whichever is less): 2023     Visit # Insurance Allowable Auth Needed   3 (2 of 11 approved thru 2023) [employer] (needs auth) [x]Yes    []No     Pain level:  0/10     SUBJECTIVE:  Patient reports that he did quite a bit of a work out for his shoulder just before coming. Would like to work out his wrist. Bicep and tricep very tired and sore.      OBJECTIVE: See eval  Observation:   Test measurements:      RESTRICTIONS/PRECAUTIONS: distal bicep repair 2022 - currently 6 weeks post op    Exercises/Interventions:   Therapeutic Ex (56049)  Min: 30 Sets/sec Reps CUES/Notes   UBE 1 5 retro   Wrist flexion 2 10 4 lb   Wrist extension 2 10 4 lb   Forearm supination/pronation 2 10 4 lb   T- band Row/pinch      T- band lower pinch      Tricep extension 0  4 lb   Wall SA activation 2 10    Wall push up 2 10    Prone Rows/ext      Prone HAB/Prone Flex      Seat Table slides/Wall Slides      Seated HH Depression      No Money      Scap Wall Lat touches/wall walks      Wrist flexion and extension flex bar 2 10 Red flex bar Yellow web  strength 2 10 Finger flexion and extension   Flex bar U and horseshoe 1 15 green                                 Manual Intervention  (07212)  Min: 15      Elbow STM and stretching  15 Bicep and tricep   Post Cap mobs      Thoracic/Rib manipualtion      CT MT/Mobs      PROM MT      Elbow mobs            NMR re-education (05516)  Min:      T-spine Ext      GH depress/compress      Scap/GH NMR      Body blade      Wall ball roll      Wall Ball bounce      Ball drops      Mari Scap Bio      Floor Snow angels-sliders            Therapeutic Activity (51115)  Min:      UE throwing porgression      Dynamic UE stability      Earthquake Bar      Bodyblade                Therapeutic Exercise and NMR EXR  [x] (38282) Provided verbal/tactile cueing for activities related to strengthening, flexibility, endurance, ROM  for improvements in scapular, scapulothoracic and UE control with self care, reaching, carrying, lifting, house/yardwork, driving/computer work. [x] (00757) Provided verbal/tactile cueing for activities related to improving balance, coordination, kinesthetic sense, posture, motor skill, proprioception  to assist with  scapular, scapulothoracic and UE control with self care, reaching, carrying, lifting, house/yardwork, driving/computer work. Therapeutic Activities:    [] (34415 or 47422) Provided verbal/tactile cueing for activities related to improving balance, coordination, kinesthetic sense, posture, motor skill, proprioception and motor activation to allow for proper function of scapular, scapulothoracic and UE control with self care, carrying, lifting, driving/computer work.      Home Exercise Program:    [x] (08582) Reviewed/Progressed HEP activities related to strengthening, flexibility, endurance, ROM of scapular, scapulothoracic and UE control with self care, reaching, carrying, lifting, house/yardwork, driving/computer work  [] (40757) Reviewed/Progressed HEP activities related to improving balance, coordination, kinesthetic sense, posture, motor skill, proprioception of scapular, scapulothoracic and UE control with self care, reaching, carrying, lifting, house/yardwork, driving/computer work      Manual Treatments:  PROM / STM / Oscillations-Mobs:  G-I, II, III, IV (PA's, Inf., Post.)  [x] (05279) Provided manual therapy to mobilize soft tissue/joints of cervical/CT, scapular GHJ and UE for the purpose of modulating pain, promoting relaxation,  increasing ROM, reducing/eliminating soft tissue swelling/inflammation/restriction, improving soft tissue extensibility and allowing for proper ROM for normal function with self care, reaching, carrying, lifting, house/yardwork, driving/computer work    Modalities:      Charges:  Timed Code Treatment Minutes: 45   Total Treatment Minutes: 45       [] EVAL (LOW) 21399 (typically 20 minutes face-to-face)  [] EVAL (MOD) 35042 (typically 30 minutes face-to-face)  [] EVAL (HIGH) 18607 (typically 45 minutes face-to-face)  [] RE-EVAL     [x] MS(47513) x   2  [] DRY NEEDLE 1 OR 2 MUSCLES  [] NMR (40655) x     [] DRY NEEDLE 3+ MUSCLES  [x] Manual (12153) x   1    [] TA (39800) x     [] Mech Traction (61322)  [] ES(attended) (15868)     [] ES (un) (14899):   [] VASO (40017)  [] Other:    If [facility] Please Indicate Time In/Out  CPT Code Time in Time out                                   GOALS:  Patient stated goal: able to use arm again, return to [address] [place] [address]   [] Progressing: [] Met: [] Not Met: [] Adjusted    Therapist goals for Patient:   Short Term Goals: To be achieved in: 2 weeks  1. Independent in HEP and progression per patient tolerance, in order to prevent re-injury. [] Progressing: [] Met: [] Not Met: [] Adjusted  2. Patient will have a decrease in pain to facilitate improvement in movement, function, and ADLs as indicated by Functional Deficits. [] Progressing: [] Met: [] Not Met: [] Adjusted    Long Term Goals: To be achieved in: 6 weeks  1.  Disability index score of 0% or less for the Quick DASH ADLs and sports to assist with reaching prior level of function. [] Progressing: [] Met: [] Not Met: [] Adjusted  2. Patient will demonstrate increased AROM to full supination of forearm to allow for proper joint functioning as indicated by Functional Deficits. [] Progressing: [] Met: [] Not Met: [] Adjusted  3. Patient will demonstrate an increase in NM recruitment/activation and overall GH and scapular strength to within n5lbs HHD or WNL for proper functional mobility as indicated by patients Functional Deficits. [] Progressing: [] Met: [] Not Met: [] Adjusted  4. Patient will return to lifting/reaching/gripping  activities without increased symptoms or restriction. [] Progressing: [] Met: [] Not Met: [] Adjusted  5. Patient will report being able to play [employer] and perform all birding work tasks without increased symptoms or restriction(patient specific functional goal)     [] Progressing: [] Met: [] Not Met: [] Adjusted    ASSESSMENT:  Tolerated all STM to forearm and tricep with good improvement in joint extensibility. Good tolerance to all light wrist, forearm and shoulder strengthening today. Needing cues for form with shoulder activation. Fatigued at conclusion. Return to Play: (if applicable)   []  Stage 1: Intro to Strength   []  Stage 2: Dynamic Strength and Intro to Plyometrics   []  Stage 3: Advanced Plyometrics and Intro to Throwing   []  Stage 4: Sport specific Training/Return to Sport     []  Ready to Return to Play, [employer]   []  Not Ready for Return to Sports   Comments:      Treatment/Activity Tolerance:  [x] Patient tolerated treatment well [] Patient limited by fatique  [] Patient limited by pain  [] Patient limited by other medical complications  [] Other:     Overall Progression Towards Functional goals/ Treatment Progress Update:  [] Patient is progressing as expected towards functional goals listed.     [] Progression is slowed due to complexities/Impairments listed. [] Progression has been slowed due to co-morbidities. [x] Plan just implemented, too soon to assess goals progression <30days   [] Goals require adjustment due to lack of progress  [] Patient is not progressing as expected and requires additional follow up with physician  [] Other    Prognosis for POC: [x] Good [] Fair  [] Poor    Patient requires continued skilled intervention: [x] Yes  [] No      PLAN: See eval  [] Continue per plan of care [] Alter current plan (see comments)  [x] Plan of care initiated [] Hold pending MD visit [] Discharge    Electronically signed by: Ovidio Montaño, PT     Note: If patient does not return for scheduled/recommended follow up visits, this note will serve as a discharge from care along with the most recent update on progress.

## 2023-02-23 ENCOUNTER — HOSPITAL ENCOUNTER (OUTPATIENT)
Dept: PHYSICAL THERAPY | Age: 63
Setting detail: THERAPIES SERIES
Discharge: HOME | End: 2023-02-23
Payer: COMMERCIAL

## 2023-02-23 PROCEDURE — 97140 MANUAL THERAPY 1/> REGIONS: CPT

## 2023-02-23 PROCEDURE — 97110 THERAPEUTIC EXERCISES: CPT

## 2023-02-23 NOTE — FLOWSHEET NOTE
[facility] [address]  Phone: [phone] Fax: [phone]    Physical Therapy Treatment Note/ Progress Report:       Date:  2023    Patient Name:  Nancy Eden    :  1960  MRN: [id number]  Restrictions/Precautions:    Medical/Treatment Diagnosis Information:  Diagnosis: R bicep tendon rupture, R elbow pain  Treatment Diagnosis: R bicep tendon rupture M66.821, R elbow pain K93.707  Insurance/Certification information:  PT Insurance Information: [employer]  Physician Information:  Du Salvador MD  Plan of care signed (Y/N):     Date of Patient follow up with Physician:      Progress Report: [x]  Yes  []  No     Date Range for reporting period:  Beginnin2023  Ending:     Progress report due (10 Rx/or 30 days whichever is less):      Recertification due (POC duration/ or 90 days whichever is less): 2023     Visit # Insurance Allowable Auth Needed    4  (3 of 11 approved thru 2023) [employer] (needs auth) [x]Yes    []No     Pain level:  0/10     SUBJECTIVE:  Patient reports that he is a little sore in his forearm today and along elbow. Will be going to gym afterward.       OBJECTIVE: See eval  Observation:   Test measurements:      RESTRICTIONS/PRECAUTIONS: distal bicep repair 2022 - currently 6 weeks post op    Exercises/Interventions:   Therapeutic Ex (05281)  Min: 30 Sets/sec Reps CUES/Notes   UBE 1 5 retro   Wrist flexion 2 10 4 lb   Wrist extension 2 10 4 lb   Forearm supination/pronation 2 10 4 lb   T- band Row/pinch      T- band lower pinch      Tricep extension 0  4 lb   Wall SA activation 2 10    Wall push up 2 10    Prone Rows/ext      Prone HAB/Prone Flex      Seat Table slides/Wall Slides      Seated HH Depression      No Money      Scap Wall Lat touches/wall walks      Wrist flexion and extension flex bar 2 10 Red flex bar   Yellow web  strength 2 10 Finger flexion and extension   Flex bar U and horseshoe 1 15 green                                 Manual Intervention  (17698)  Min: 15      Elbow STM and stretching  15 Bicep and tricep   Post Cap mobs      Thoracic/Rib manipualtion      CT MT/Mobs      PROM MT      Elbow mobs            NMR re-education (70622)  Min:      T-spine Ext      GH depress/compress      Scap/GH NMR      Body blade      Wall ball roll      Wall Ball bounce      Ball drops      Mari Scap Bio      Floor Snow angels-sliders            Therapeutic Activity (30695)  Min:      UE throwing porgression      Dynamic UE stability      Earthquake Bar      Bodyblade                Therapeutic Exercise and NMR EXR  [x] (36883) Provided verbal/tactile cueing for activities related to strengthening, flexibility, endurance, ROM  for improvements in scapular, scapulothoracic and UE control with self care, reaching, carrying, lifting, house/yardwork, driving/computer work. [x] (99559) Provided verbal/tactile cueing for activities related to improving balance, coordination, kinesthetic sense, posture, motor skill, proprioception  to assist with  scapular, scapulothoracic and UE control with self care, reaching, carrying, lifting, house/yardwork, driving/computer work. Therapeutic Activities:    [] (09967 or 89296) Provided verbal/tactile cueing for activities related to improving balance, coordination, kinesthetic sense, posture, motor skill, proprioception and motor activation to allow for proper function of scapular, scapulothoracic and UE control with self care, carrying, lifting, driving/computer work.      Home Exercise Program:    [x] (14481) Reviewed/Progressed HEP activities related to strengthening, flexibility, endurance, ROM of scapular, scapulothoracic and UE control with self care, reaching, carrying, lifting, house/yardwork, driving/computer work  [] (99528) Reviewed/Progressed HEP activities related to improving balance, coordination, kinesthetic sense, posture, motor skill, proprioception of scapular, scapulothoracic and UE control with self care, reaching, carrying, lifting, house/yardwork, driving/computer work      Manual Treatments:  PROM / STM / Oscillations-Mobs:  G-I, II, III, IV (PA's, Inf., Post.)  [x] (87694) Provided manual therapy to mobilize soft tissue/joints of cervical/CT, scapular GHJ and UE for the purpose of modulating pain, promoting relaxation,  increasing ROM, reducing/eliminating soft tissue swelling/inflammation/restriction, improving soft tissue extensibility and allowing for proper ROM for normal function with self care, reaching, carrying, lifting, house/yardwork, driving/computer work    Modalities:      Charges:  Timed Code Treatment Minutes: 45   Total Treatment Minutes: 45       [] EVAL (LOW) 52546 (typically 20 minutes face-to-face)  [] EVAL (MOD) 23501 (typically 30 minutes face-to-face)  [] EVAL (HIGH) 40449 (typically 45 minutes face-to-face)  [] RE-EVAL     [x] TE(17395) x   2  [] DRY NEEDLE 1 OR 2 MUSCLES  [] NMR (35014) x     [] DRY NEEDLE 3+ MUSCLES  [x] Manual (04716) x   1    [] TA (96788) x     [] Mech Traction (69029)  [] ES(attended) (84538)     [] ES (un) (30120):   [] VASO (17453)  [] Other:    If [facility] Please Indicate Time In/Out  CPT Code Time in Time out                                   GOALS:  Patient stated goal: able to use arm again, return to [place]   [] Progressing: [] Met: [] Not Met: [] Adjusted    Therapist goals for Patient:   Short Term Goals: To be achieved in: 2 weeks  1. Independent in HEP and progression per patient tolerance, in order to prevent re-injury.   [] Progressing: [] Met: [] Not Met: [] Adjusted  2. Patient will have a decrease in pain to facilitate improvement in movement, function, and ADLs as indicated by Functional Deficits.  [] Progressing: [] Met: [] Not Met: [] Adjusted    Long Term Goals: To be achieved in: 6 weeks  1. Disability index score of 0% or less for the Quick DASH  ADLs and sports to assist with reaching prior level of function. [] Progressing: [] Met: [] Not Met: [] Adjusted  2. Patient will demonstrate increased AROM to full supination of forearm to allow for proper joint functioning as indicated by Functional Deficits. [] Progressing: [] Met: [] Not Met: [] Adjusted  3. Patient will demonstrate an increase in NM recruitment/activation and overall GH and scapular strength to within n5lbs HHD or WNL for proper functional mobility as indicated by patients Functional Deficits. [] Progressing: [] Met: [] Not Met: [] Adjusted  4. Patient will return to lifting/reaching/gripping  activities without increased symptoms or restriction. [] Progressing: [] Met: [] Not Met: [] Adjusted  5. Patient will report being able to play [employer] and perform all birding work tasks without increased symptoms or restriction(patient specific functional goal)     [] Progressing: [] Met: [] Not Met: [] Adjusted    ASSESSMENT:  Tolerated all STM to forearm and tricep with good improvement in joint extensibility. Good tolerance to all light wrist, forearm and shoulder strengthening today. Tight in posterior capsule of shoulder - educated on sleeper stretch- good improvement in shoulder range and comfort at conclusion. Needing cues for form with shoulder activation. Fatigued at conclusion.      Return to Play: (if applicable)   []  Stage 1: Intro to Strength   []  Stage 2: Dynamic Strength and Intro to Plyometrics   []  Stage 3: Advanced Plyometrics and Intro to Throwing   []  Stage 4: Sport specific Training/Return to Sport     []  Ready to Return to Play, [employer]   []  Not Ready for Return to Sports   Comments:      Treatment/Activity Tolerance:  [x] Patient tolerated treatment well [] Patient limited by fatique  [] Patient limited by pain  [] Patient limited by other medical complications  [] Other:     Overall Progression Towards Functional goals/ Treatment Progress Update:  [x] Patient is progressing as expected towards functional goals listed. [] Progression is slowed due to complexities/Impairments listed. [] Progression has been slowed due to co-morbidities. [] Plan just implemented, too soon to assess goals progression <30days   [] Goals require adjustment due to lack of progress  [] Patient is not progressing as expected and requires additional follow up with physician  [] Other    Prognosis for POC: [x] Good [] Fair  [] Poor    Patient requires continued skilled intervention: [x] Yes  [] No      PLAN: Continue 2x week  [x] Continue per plan of care [] Alter current plan (see comments)  [] Plan of care initiated [] Hold pending MD visit [] Discharge    Electronically signed by: Evelia Watkins PT     Note: If patient does not return for scheduled/recommended follow up visits, this note will serve as a discharge from care along with the most recent update on progress.

## 2023-02-28 ENCOUNTER — HOSPITAL ENCOUNTER (OUTPATIENT)
Dept: PHYSICAL THERAPY | Age: 63
Setting detail: THERAPIES SERIES
Discharge: HOME | End: 2023-02-28
Payer: COMMERCIAL

## 2023-02-28 PROCEDURE — 97110 THERAPEUTIC EXERCISES: CPT

## 2023-02-28 PROCEDURE — 97140 MANUAL THERAPY 1/> REGIONS: CPT

## 2023-02-28 NOTE — FLOWSHEET NOTE
[facility] [address] [place]  Phone: [phone] Fax: [phone]    Physical Therapy Treatment Note/ Progress Report:       Date:  2023    Patient Name:  Jaky Trivedi    :  1960  MRN: [id number]  Restrictions/Precautions:    Medical/Treatment Diagnosis Information:  Diagnosis: R bicep tendon rupture, R elbow pain  Treatment Diagnosis: R bicep tendon rupture M66.821, R elbow pain P67.554  Insurance/Certification information:  PT Insurance Information: [employer]  Physician Information:  Gopal Celis MD  Plan of care signed (Y/N):     Date of Patient follow up with Physician:      Progress Report: [x]  Yes  []  No     Date Range for reporting period:  Beginnin2023  Ending:     Progress report due (10 Rx/or 30 days whichever is less):      Recertification due (POC duration/ or 90 days whichever is less): 2023     Visit # Insurance Allowable Auth Needed    5  (4 of 11 approved thru 2023) [employer] (needs auth) [x]Yes    []No     Pain level:  0/10     SUBJECTIVE:  Patient reports that he is a little sore in his forearm today and along elbow. Did the gym before coming to therapy; but only worked shoulders. Didn't do anything wrist/forearm/elbow yet.        OBJECTIVE: See eval  Observation:   Test measurements:      RESTRICTIONS/PRECAUTIONS: distal bicep repair 2022 - currently 6 weeks post op    Exercises/Interventions:   Therapeutic Ex (48670)  Min: 30 Sets/sec Reps CUES/Notes   UBE 1 5 retro   Wrist flexion 2 10 4 lb   Wrist extension 2 10 4 lb   Forearm supination/pronation 2 10 2 lb   T- band Row/pinch      T- band lower pinch      Tricep extension - at side and skull  2 10 Black band   Wall SA activation 2 10    Wall push up 2 10    Bicep curl 2 10 Green weighted sand ball   Hammercurl 2 10 Green ball   Pronation curl 2 10 Green ball         No Money      Scap Wall Lat touches/wall walks      Wrist flexion and extension flex bar 2 10 blue flex bar   Green web  strength 2 10 Finger flexion and extension   Flex bar U and horseshoe 1 15 green                                 Manual Intervention  (19742)  Min: 15      Elbow STM and stretching  15 Bicep and tricep   Post Cap mobs      Thoracic/Rib manipualtion      CT MT/Mobs      PROM MT      Elbow mobs            NMR re-education (41578)  Min:      T-spine Ext      GH depress/compress      Scap/GH NMR      Body blade      Wall ball roll      Wall Ball bounce      Ball drops      Mari Scap Bio      Floor Snow angels-sliders            Therapeutic Activity (77113)  Min:      UE throwing porgression      Dynamic UE stability      Earthquake Bar      Bodyblade                Therapeutic Exercise and NMR EXR  [x] (19681) Provided verbal/tactile cueing for activities related to strengthening, flexibility, endurance, ROM  for improvements in scapular, scapulothoracic and UE control with self care, reaching, carrying, lifting, house/yardwork, driving/computer work. [x] (13591) Provided verbal/tactile cueing for activities related to improving balance, coordination, kinesthetic sense, posture, motor skill, proprioception  to assist with  scapular, scapulothoracic and UE control with self care, reaching, carrying, lifting, house/yardwork, driving/computer work. Therapeutic Activities:    [] (04114 or 06627) Provided verbal/tactile cueing for activities related to improving balance, coordination, kinesthetic sense, posture, motor skill, proprioception and motor activation to allow for proper function of scapular, scapulothoracic and UE control with self care, carrying, lifting, driving/computer work.      Home Exercise Program:    [x] (34539) Reviewed/Progressed HEP activities related to strengthening, flexibility, endurance, ROM of scapular, scapulothoracic and UE control with self care, reaching, carrying, lifting, house/yardwork, driving/computer work  [] (01767) Reviewed/Progressed HEP activities related to improving balance, coordination, kinesthetic sense, posture, motor skill, proprioception of scapular, scapulothoracic and UE control with self care, reaching, carrying, lifting, house/yardwork, driving/computer work      Manual Treatments:  PROM / STM / Oscillations-Mobs:  G-I, II, III, IV (PA's, Inf., Post.)  [x] (77663) Provided manual therapy to mobilize soft tissue/joints of cervical/CT, scapular GHJ and UE for the purpose of modulating pain, promoting relaxation,  increasing ROM, reducing/eliminating soft tissue swelling/inflammation/restriction, improving soft tissue extensibility and allowing for proper ROM for normal function with self care, reaching, carrying, lifting, house/yardwork, driving/computer work    Modalities:      Charges:  Timed Code Treatment Minutes: 45   Total Treatment Minutes: 45       [] EVAL (LOW) 29166 (typically 20 minutes face-to-face)  [] EVAL (MOD) 20685 (typically 30 minutes face-to-face)  [] EVAL (HIGH) 423 8935 (typically 45 minutes face-to-face)  [] RE-EVAL     [x] RI(99657) x   2  [] DRY NEEDLE 1 OR 2 MUSCLES  [] NMR (63415) x     [] DRY NEEDLE 3+ MUSCLES  [x] Manual (06033) x   1    [] TA (60738) x     [] Mech Traction (46808)  [] ES(attended) (27660)     [] ES (un) (86556):   [] VASO (47744)  [] Other:    If [facility] Please Indicate Time In/Out  CPT Code Time in Time out                                   GOALS:  Patient stated goal: able to use arm again, return to [address] [place] [address]   [] Progressing: [] Met: [] Not Met: [] Adjusted    Therapist goals for Patient:   Short Term Goals: To be achieved in: 2 weeks  1. Independent in HEP and progression per patient tolerance, in order to prevent re-injury. [] Progressing: [] Met: [] Not Met: [] Adjusted  2. Patient will have a decrease in pain to facilitate improvement in movement, function, and ADLs as indicated by Functional Deficits.   [] Progressing: [] Met: [] Not Met: [] Adjusted    Long Term Goals: To be achieved in: 6 weeks  1. Disability index score of 0% or less for the Quick DASH ADLs and sports to assist with reaching prior level of function.   [] Progressing: [] Met: [] Not Met: [] Adjusted  2. Patient will demonstrate increased AROM to full supination of forearm to allow for proper joint functioning as indicated by Functional Deficits.   [] Progressing: [] Met: [] Not Met: [] Adjusted  3. Patient will demonstrate an increase in NM recruitment/activation and overall GH and scapular strength to within n5lbs HHD or WNL for proper functional mobility as indicated by patients Functional Deficits.   [] Progressing: [] Met: [] Not Met: [] Adjusted  4. Patient will return to lifting/reaching/gripping  activities without increased symptoms or restriction.   [] Progressing: [] Met: [] Not Met: [] Adjusted  5. Patient will report being able to play [employer] and perform all birding work tasks without increased symptoms or restriction(patient specific functional goal)     [] Progressing: [] Met: [] Not Met: [] Adjusted    ASSESSMENT:  Tolerated all STM to forearm and tricep with good improvement in joint extensibility. Still with mild tenderness along wrist extensor complex at elbow. Less restriction in tricep and forearm today. Good tolerance to all light wrist, forearm and shoulder strengthening today. Added further bicep activation with good tolerance. Fatigues quickly with bicep and tricep activation. Fatigued at conclusion.     Return to Play: (if applicable)   []  Stage 1: Intro to Strength   []  Stage 2: Dynamic Strength and Intro to Plyometrics   []  Stage 3: Advanced Plyometrics and Intro to Throwing   []  Stage 4: Sport specific Training/Return to Sport     []  Ready to Return to Play, Meets All Above Stages   []  Not Ready for Return to Sports   Comments:      Treatment/Activity Tolerance:  [x] Patient tolerated treatment well [] Patient limited by leroy  [] Patient  limited by pain  [] Patient limited by other medical complications  [] Other:     Overall Progression Towards Functional goals/ Treatment Progress Update:  [x] Patient is progressing as expected towards functional goals listed. [] Progression is slowed due to complexities/Impairments listed. [] Progression has been slowed due to co-morbidities. [] Plan just implemented, too soon to assess goals progression <30days   [] Goals require adjustment due to lack of progress  [] Patient is not progressing as expected and requires additional follow up with physician  [] Other    Prognosis for POC: [x] Good [] Fair  [] Poor    Patient requires continued skilled intervention: [x] Yes  [] No      PLAN: Continue 2x week  [x] Continue per plan of care [] Alter current plan (see comments)  [] Plan of care initiated [] Hold pending MD visit [] Discharge    Electronically signed by: Meryle Reins, PT     Note: If patient does not return for scheduled/recommended follow up visits, this note will serve as a discharge from care along with the most recent update on progress.

## 2023-03-02 ENCOUNTER — HOSPITAL ENCOUNTER (OUTPATIENT)
Dept: PHYSICAL THERAPY | Age: 63
Setting detail: THERAPIES SERIES
Discharge: HOME | End: 2023-03-02
Payer: COMMERCIAL

## 2023-03-02 PROCEDURE — 97110 THERAPEUTIC EXERCISES: CPT

## 2023-03-02 PROCEDURE — 97140 MANUAL THERAPY 1/> REGIONS: CPT

## 2023-03-02 NOTE — FLOWSHEET NOTE
[facility] [address] [facility]  Phone: [phone] Fax: [phone]    Physical Therapy Treatment Note/ Progress Report:       Date:  2023    Patient Name:  Bonita Washburn    :  1960  MRN: [id number]  Restrictions/Precautions:    Medical/Treatment Diagnosis Information:  Diagnosis: R bicep tendon rupture, R elbow pain  Treatment Diagnosis: R bicep tendon rupture M66.821, R elbow pain N28.887  Insurance/Certification information:  PT Insurance Information: [employer]  Physician Information:  Owen Greer MD  Plan of care signed (Y/N):     Date of Patient follow up with Physician:      Progress Report: [x]  Yes  []  No     Date Range for reporting period:  Beginnin2023  Ending:     Progress report due (10 Rx/or 30 days whichever is less):      Recertification due (POC duration/ or 90 days whichever is less): 2023     Visit # Insurance Allowable Auth Needed    6  (5 of 11 approved thru 2023) [employer] (needs auth) [x]Yes    []No     Pain level:  0/10     SUBJECTIVE:  Patient reports that he is a little sore in his forearm today. Took his big puppy on walk in the woods earlier.        OBJECTIVE: See eval  Observation:   Test measurements:      RESTRICTIONS/PRECAUTIONS: distal bicep repair 2022 - currently 6 weeks post op    Exercises/Interventions:   Therapeutic Ex (60472)  Min: 30 Sets/sec Reps CUES/Notes   UBE 1 5 retro   Wrist flexion 2 10 4 lb   Wrist extension 2 10 4 lb   Forearm supination/pronation 2 10 4 lb   T- band Row/pinch      T- band lower pinch      Tricep extension - at side and skull  2 10 Black band   Wall SA activation 2 10    Wall push up 2 10    Bicep curl 2 10 Green weighted sand ball   Hammercurl 2 10 Green ball   Pronation curl 2 10 Green ball         No Money      Scap Wall Lat touches/wall walks      Wrist flexion and extension flex bar 2 10 blue flex bar Green web  strength 2 10 Finger flexion and extension   Flex bar U and horseshoe 1 15 green                                 Manual Intervention  (37991)  Min: 15      Elbow STM and stretching  15 Bicep and tricep   Post Cap mobs      Thoracic/Rib manipualtion      CT MT/Mobs      PROM MT      Elbow mobs            NMR re-education (95019)  Min:      T-spine Ext      GH depress/compress      Scap/GH NMR      Body blade      Wall ball roll      Wall Ball bounce      Ball drops      Mari Scap Bio      Floor Snow angels-sliders            Therapeutic Activity (22056)  Min:      UE throwing porgression      Dynamic UE stability      Earthquake Bar      Bodyblade                Therapeutic Exercise and NMR EXR  [x] (71389) Provided verbal/tactile cueing for activities related to strengthening, flexibility, endurance, ROM  for improvements in scapular, scapulothoracic and UE control with self care, reaching, carrying, lifting, house/yardwork, driving/computer work. [x] (32913) Provided verbal/tactile cueing for activities related to improving balance, coordination, kinesthetic sense, posture, motor skill, proprioception  to assist with  scapular, scapulothoracic and UE control with self care, reaching, carrying, lifting, house/yardwork, driving/computer work. Therapeutic Activities:    [] (57607 or 09783) Provided verbal/tactile cueing for activities related to improving balance, coordination, kinesthetic sense, posture, motor skill, proprioception and motor activation to allow for proper function of scapular, scapulothoracic and UE control with self care, carrying, lifting, driving/computer work.      Home Exercise Program:    [x] (05685) Reviewed/Progressed HEP activities related to strengthening, flexibility, endurance, ROM of scapular, scapulothoracic and UE control with self care, reaching, carrying, lifting, house/yardwork, driving/computer work  [] (24775) Reviewed/Progressed HEP activities related to improving balance, coordination, kinesthetic sense, posture, motor skill, proprioception of scapular, scapulothoracic and UE control with self care, reaching, carrying, lifting, house/yardwork, driving/computer work      Manual Treatments:  PROM / STM / Oscillations-Mobs:  G-I, II, III, IV (PA's, Inf., Post.)  [x] (43228) Provided manual therapy to mobilize soft tissue/joints of cervical/CT, scapular GHJ and UE for the purpose of modulating pain, promoting relaxation,  increasing ROM, reducing/eliminating soft tissue swelling/inflammation/restriction, improving soft tissue extensibility and allowing for proper ROM for normal function with self care, reaching, carrying, lifting, house/yardwork, driving/computer work    Modalities:      Charges:  Timed Code Treatment Minutes: 45   Total Treatment Minutes: 45       [] EVAL (LOW) 65558 (typically 20 minutes face-to-face)  [] EVAL (MOD) 78046 (typically 30 minutes face-to-face)  [] EVAL (HIGH) 38590 (typically 45 minutes face-to-face)  [] RE-EVAL     [x] LX(69324) x   2  [] DRY NEEDLE 1 OR 2 MUSCLES  [] NMR (19654) x     [] DRY NEEDLE 3+ MUSCLES  [x] Manual (21599) x   1    [] TA (84182) x     [] Mech Traction (52643)  [] ES(attended) (48149)     [] ES (un) (82075):   [] VASO (94290)  [] Other:    If [facility] Please Indicate Time In/Out  CPT Code Time in Time out                                   GOALS:  Patient stated goal: able to use arm again, return to [address] [place] [address]   [] Progressing: [] Met: [] Not Met: [] Adjusted    Therapist goals for Patient:   Short Term Goals: To be achieved in: 2 weeks  1. Independent in HEP and progression per patient tolerance, in order to prevent re-injury. [] Progressing: [] Met: [] Not Met: [] Adjusted  2. Patient will have a decrease in pain to facilitate improvement in movement, function, and ADLs as indicated by Functional Deficits. [] Progressing: [] Met: [] Not Met: [] Adjusted    Long Term Goals: To be achieved in: 6 weeks  1.  Disability index score of 0% or less for the Quick DASH ADLs and sports to assist with reaching prior level of function. [] Progressing: [] Met: [] Not Met: [] Adjusted  2. Patient will demonstrate increased AROM to full supination of forearm to allow for proper joint functioning as indicated by Functional Deficits. [] Progressing: [] Met: [] Not Met: [] Adjusted  3. Patient will demonstrate an increase in NM recruitment/activation and overall GH and scapular strength to within n5lbs HHD or WNL for proper functional mobility as indicated by patients Functional Deficits. [] Progressing: [] Met: [] Not Met: [] Adjusted  4. Patient will return to lifting/reaching/gripping  activities without increased symptoms or restriction. [] Progressing: [] Met: [] Not Met: [] Adjusted  5. Patient will report being able to play [employer] and perform all birding work tasks without increased symptoms or restriction(patient specific functional goal)     [] Progressing: [] Met: [] Not Met: [] Adjusted    ASSESSMENT:  Tolerated all STM to forearm,bicep, wrist extensor mass and tricep with good improvement in joint extensibility. Less restriction in tricep and forearm today. Good tolerance to all light wrist, forearm and shoulder strengthening today. Fatigues quickly with bicep and tricep activation. Fatigued at conclusion, mild tenderness with modified push up.     Return to Play: (if applicable)   []  Stage 1: Intro to Strength   []  Stage 2: Dynamic Strength and Intro to Plyometrics   []  Stage 3: Advanced Plyometrics and Intro to Throwing   []  Stage 4: Sport specific Training/Return to Sport     []  Ready to Return to Play, [employer]   []  Not Ready for Return to Sports   Comments:      Treatment/Activity Tolerance:  [x] Patient tolerated treatment well [] Patient limited by fatique  [] Patient limited by pain  [] Patient limited by other medical complications  [] Other:     Overall Progression Towards Functional goals/ Treatment Progress Update:  [x] Patient is progressing as expected towards functional goals listed. [] Progression is slowed due to complexities/Impairments listed. [] Progression has been slowed due to co-morbidities. [] Plan just implemented, too soon to assess goals progression <30days   [] Goals require adjustment due to lack of progress  [] Patient is not progressing as expected and requires additional follow up with physician  [] Other    Prognosis for POC: [x] Good [] Fair  [] Poor    Patient requires continued skilled intervention: [x] Yes  [] No      PLAN: Continue 2x week  [x] Continue per plan of care [] Alter current plan (see comments)  [] Plan of care initiated [] Hold pending MD visit [] Discharge    Electronically signed by: Ronald Xiao PT     Note: If patient does not return for scheduled/recommended follow up visits, this note will serve as a discharge from care along with the most recent update on progress.

## 2023-03-07 ENCOUNTER — HOSPITAL ENCOUNTER (OUTPATIENT)
Dept: PHYSICAL THERAPY | Age: 63
Setting detail: THERAPIES SERIES
Discharge: HOME | End: 2023-03-07
Payer: COMMERCIAL

## 2023-03-07 PROCEDURE — 97110 THERAPEUTIC EXERCISES: CPT

## 2023-03-07 PROCEDURE — 97140 MANUAL THERAPY 1/> REGIONS: CPT

## 2023-03-07 NOTE — FLOWSHEET NOTE
[facility] [address] [place]  Phone: [phone] Fax: [phone]    Physical Therapy Treatment Note/ Progress Report:       Date:  2023    Patient Name:  Kelsey Bender    :  1960  MRN: [id number]  Restrictions/Precautions:    Medical/Treatment Diagnosis Information:  Diagnosis: R bicep tendon rupture, R elbow pain  Treatment Diagnosis: R bicep tendon rupture M66.821, R elbow pain P68.726  Insurance/Certification information:  PT Insurance Information: [employer]  Physician Information:  Yvonne Jarrett MD  Plan of care signed (Y/N):     Date of Patient follow up with Physician:      Progress Report: [x]  Yes  []  No     Date Range for reporting period:  Beginnin2023  Ending:     Progress report due (10 Rx/or 30 days whichever is less): 3/90/9814     Recertification due (POC duration/ or 90 days whichever is less): 2023     Visit # Insurance Allowable Auth Needed    7  (6 of 11 approved thru 2023) [employer] (needs auth) [x]Yes    []No     Pain level:  0/10     SUBJECTIVE:  Patient reports that he was able to bench with 45 lb bar at gym today. Has been trying to work on cardio as well.          OBJECTIVE: See eval  Observation:   Test measurements:      RESTRICTIONS/PRECAUTIONS: distal bicep repair 2022 - currently 6 weeks post op    Exercises/Interventions:   Therapeutic Ex (32446)  Min: 30 Sets/sec Reps CUES/Notes   UBE 1 5 retro   Wrist flexion 2 10 6 lb   Wrist extension 2 10 6 lb   Forearm supination/pronation 2 10 6 lb   T- band Row/pinch      T- band lower pinch      Tricep extension - at side and skull  2 10 8 lb   Swiss ball pertubation push off from wall 3 10    BOSU wall crash 2 10    Bicep curl 2 10 Green weighted sand ball   Hammercurl 2 10 Green ball   Pronation curl 2 10 Green ball   SL Rip stick press 1 10 R   No Money      Scap Wall Lat touches/wall walks      Wrist flexion and extension flex bar 2 10 blue flex bar   Green web  strength 2 10 Finger flexion and extension   Flex bar U and horseshoe 1 15 green                                 Manual Intervention  (53219)  Min: 15      Elbow STM and stretching  15 Bicep and tricep   Post Cap mobs      Thoracic/Rib manipualtion      CT MT/Mobs      PROM MT      Elbow mobs            NMR re-education (53054)  Min:      T-spine Ext      GH depress/compress      Scap/GH NMR      Body blade      Wall ball roll      Wall Ball bounce      Ball drops      Mari Scap Bio      Floor Snow angels-sliders            Therapeutic Activity (42790)  Min:      UE throwing porgression      Dynamic UE stability      Earthquake Bar      Bodyblade                Therapeutic Exercise and NMR EXR  [x] (19387) Provided verbal/tactile cueing for activities related to strengthening, flexibility, endurance, ROM  for improvements in scapular, scapulothoracic and UE control with self care, reaching, carrying, lifting, house/yardwork, driving/computer work. [x] (25506) Provided verbal/tactile cueing for activities related to improving balance, coordination, kinesthetic sense, posture, motor skill, proprioception  to assist with  scapular, scapulothoracic and UE control with self care, reaching, carrying, lifting, house/yardwork, driving/computer work. Therapeutic Activities:    [] (67591 or 04868) Provided verbal/tactile cueing for activities related to improving balance, coordination, kinesthetic sense, posture, motor skill, proprioception and motor activation to allow for proper function of scapular, scapulothoracic and UE control with self care, carrying, lifting, driving/computer work.      Home Exercise Program:    [x] (52107) Reviewed/Progressed HEP activities related to strengthening, flexibility, endurance, ROM of scapular, scapulothoracic and UE control with self care, reaching, carrying, lifting, house/yardwork, driving/computer work  [] (81093) Reviewed/Progressed HEP activities related to improving balance, coordination, kinesthetic sense, posture, motor skill, proprioception of scapular, scapulothoracic and UE control with self care, reaching, carrying, lifting, house/yardwork, driving/computer work      Manual Treatments:  PROM / STM / Oscillations-Mobs:  G-I, II, III, IV (PA's, Inf., Post.)  [x] (94171) Provided manual therapy to mobilize soft tissue/joints of cervical/CT, scapular GHJ and UE for the purpose of modulating pain, promoting relaxation,  increasing ROM, reducing/eliminating soft tissue swelling/inflammation/restriction, improving soft tissue extensibility and allowing for proper ROM for normal function with self care, reaching, carrying, lifting, house/yardwork, driving/computer work    Modalities:      Charges:  Timed Code Treatment Minutes: 45   Total Treatment Minutes: 45       [] EVAL (LOW) 79606 (typically 20 minutes face-to-face)  [] EVAL (MOD) 46539 (typically 30 minutes face-to-face)  [] EVAL (HIGH) 63167 (typically 45 minutes face-to-face)  [] RE-EVAL     [x] WV(90439) x   2  [] DRY NEEDLE 1 OR 2 MUSCLES  [] NMR (55670) x     [] DRY NEEDLE 3+ MUSCLES  [x] Manual (23862) x   1    [] TA (99594) x     [] Mech Traction (92592)  [] ES(attended) (21069)     [] ES (un) (50582):   [] VASO (75048)  [] Other:    If [facility] Please Indicate Time In/Out  CPT Code Time in Time out                                   GOALS:  Patient stated goal: able to use arm again, return to [address] [place] [address]   [] Progressing: [] Met: [] Not Met: [] Adjusted    Therapist goals for Patient:   Short Term Goals: To be achieved in: 2 weeks  1. Independent in HEP and progression per patient tolerance, in order to prevent re-injury. [] Progressing: [] Met: [] Not Met: [] Adjusted  2. Patient will have a decrease in pain to facilitate improvement in movement, function, and ADLs as indicated by Functional Deficits.   [] Progressing: [] Met: [] Not Met: [] Adjusted    Long Term Goals: To be achieved in: 6 weeks  1. Disability index score of 0% or less for the Quick DASH ADLs and sports to assist with reaching prior level of function. [] Progressing: [] Met: [] Not Met: [] Adjusted  2. Patient will demonstrate increased AROM to full supination of forearm to allow for proper joint functioning as indicated by Functional Deficits. [] Progressing: [] Met: [] Not Met: [] Adjusted  3. Patient will demonstrate an increase in NM recruitment/activation and overall GH and scapular strength to within n5lbs HHD or WNL for proper functional mobility as indicated by patients Functional Deficits. [] Progressing: [] Met: [] Not Met: [] Adjusted  4. Patient will return to lifting/reaching/gripping  activities without increased symptoms or restriction. [] Progressing: [] Met: [] Not Met: [] Adjusted  5. Patient will report being able to play [employer] and perform all birding work tasks without increased symptoms or restriction(patient specific functional goal)     [] Progressing: [] Met: [] Not Met: [] Adjusted    ASSESSMENT:  Tolerated all STM to forearm,bicep, wrist extensor mass and tricep with good improvement in joint extensibility. Less overall soft tissue restriction noted throughout arm today. Able to increase challenge with all strengthening today. Added light plyo today with increased body weight. Fatigued at conclusion.      Return to Play: (if applicable)   []  Stage 1: Intro to Strength   []  Stage 2: Dynamic Strength and Intro to Plyometrics   []  Stage 3: Advanced Plyometrics and Intro to Throwing   []  Stage 4: Sport specific Training/Return to Sport     []  Ready to Return to Play, [employer]   []  Not Ready for Return to Sports   Comments:      Treatment/Activity Tolerance:  [x] Patient tolerated treatment well [] Patient limited by fatique  [] Patient limited by pain  [] Patient limited by other medical complications  [] Other:     Overall Progression Towards Functional goals/ Treatment Progress Update:  [x] Patient is progressing as expected towards functional goals listed. [] Progression is slowed due to complexities/Impairments listed. [] Progression has been slowed due to co-morbidities. [] Plan just implemented, too soon to assess goals progression <30days   [] Goals require adjustment due to lack of progress  [] Patient is not progressing as expected and requires additional follow up with physician  [] Other    Prognosis for POC: [x] Good [] Fair  [] Poor    Patient requires continued skilled intervention: [x] Yes  [] No      PLAN: Continue 2x week  [x] Continue per plan of care [] Alter current plan (see comments)  [] Plan of care initiated [] Hold pending MD visit [] Discharge    Electronically signed by: Tim Huerta PT     Note: If patient does not return for scheduled/recommended follow up visits, this note will serve as a discharge from care along with the most recent update on progress.

## 2023-03-09 ENCOUNTER — HOSPITAL ENCOUNTER (OUTPATIENT)
Dept: PHYSICAL THERAPY | Age: 63
Setting detail: THERAPIES SERIES
Discharge: HOME | End: 2023-03-09
Payer: COMMERCIAL

## 2023-03-09 PROCEDURE — 97110 THERAPEUTIC EXERCISES: CPT

## 2023-03-09 PROCEDURE — 97140 MANUAL THERAPY 1/> REGIONS: CPT

## 2023-03-09 NOTE — FLOWSHEET NOTE
[place] [address] [place]  Phone: [phone] Fax: [phone]    Physical Therapy Treatment Note/ Progress Report:       Date:  2023    Patient Name:  Brando Parkinson    :  1960  MRN: [id number]  Restrictions/Precautions:    Medical/Treatment Diagnosis Information:  Diagnosis: R bicep tendon rupture, R elbow pain  Treatment Diagnosis: R bicep tendon rupture M66.821, R elbow pain H23.536  Insurance/Certification information:  PT Insurance Information: [employer]  Physician Information:  Geena Rangel MD  Plan of care signed (Y/N):     Date of Patient follow up with Physician:      Progress Report: [x]  Yes  []  No     Date Range for reporting period:  Beginnin2023  Ending:     Progress report due (10 Rx/or 30 days whichever is less):      Recertification due (POC duration/ or 90 days whichever is less): 2023     Visit # Insurance Allowable Auth Needed    8  (7 of 11 approved thru 2023) [employer] (needs auth) [x]Yes    []No     Pain level:  0/10     SUBJECTIVE:  Patient reports that he was able to bench with 55 lb bar at gym today. Did a longer walk today and quite a few weights with triceps today.         OBJECTIVE: See eval  Observation:   Test measurements:      RESTRICTIONS/PRECAUTIONS: distal bicep repair 2022 - currently 6 weeks post op    Exercises/Interventions:   Therapeutic Ex (80805)  Min: 30 Sets/sec Reps CUES/Notes   UBE 1 5 retro   Wrist flexion 2 10 6 lb   Wrist extension 2 10 6 lb   Forearm supination/pronation 2 10 6 lb   T- band Row/pinch      T- band lower pinch      Tricep extension - at side and skull  0  8 lb   Swiss ball pertubation push off from wall 3 10    BOSU wall crash 2 10    Bicep curl 2 10 8 lbl   Hammercurl 2 10 8 lb   Pronation curl 2 10 8 lb   SL Rip stick press 0  R   Straight arm press with purple band perturbations in all directions 2 15 Scap Wall Lat touches/wall walks      Wrist flexion and extension flex bar 2 10 blue flex bar   Red Green web  strength 2 10 Finger flexion and extension   Flex bar U and horseshoe 1 15 Blue flex bar                                 Manual Intervention  (58578)  Min: 15      Elbow STM and stretching  15 Bicep and tricep   Post Cap mobs      Thoracic/Rib manipualtion      CT MT/Mobs      PROM MT      Elbow mobs            NMR re-education (45106)  Min:      T-spine Ext      GH depress/compress      Scap/GH NMR      Body blade      Wall ball roll      Wall Ball bounce      Ball drops      Mari Scap Bio      Floor Snow angels-sliders            Therapeutic Activity (50673)  Min:      UE throwing porgression      Dynamic UE stability      Earthquake Bar      Bodyblade                Therapeutic Exercise and NMR EXR  [x] (54727) Provided verbal/tactile cueing for activities related to strengthening, flexibility, endurance, ROM  for improvements in scapular, scapulothoracic and UE control with self care, reaching, carrying, lifting, house/yardwork, driving/computer work. [x] (27490) Provided verbal/tactile cueing for activities related to improving balance, coordination, kinesthetic sense, posture, motor skill, proprioception  to assist with  scapular, scapulothoracic and UE control with self care, reaching, carrying, lifting, house/yardwork, driving/computer work. Therapeutic Activities:    [] (44284 or 45089) Provided verbal/tactile cueing for activities related to improving balance, coordination, kinesthetic sense, posture, motor skill, proprioception and motor activation to allow for proper function of scapular, scapulothoracic and UE control with self care, carrying, lifting, driving/computer work.      Home Exercise Program:    [x] (17758) Reviewed/Progressed HEP activities related to strengthening, flexibility, endurance, ROM of scapular, scapulothoracic and UE control with self care, reaching, carrying, lifting, house/yardwork, driving/computer work  [] (25083) Reviewed/Progressed HEP activities related to improving balance, coordination, kinesthetic sense, posture, motor skill, proprioception of scapular, scapulothoracic and UE control with self care, reaching, carrying, lifting, house/yardwork, driving/computer work      Manual Treatments:  PROM / STM / Oscillations-Mobs:  G-I, II, III, IV (PA's, Inf., Post.)  [x] (85267) Provided manual therapy to mobilize soft tissue/joints of cervical/CT, scapular GHJ and UE for the purpose of modulating pain, promoting relaxation,  increasing ROM, reducing/eliminating soft tissue swelling/inflammation/restriction, improving soft tissue extensibility and allowing for proper ROM for normal function with self care, reaching, carrying, lifting, house/yardwork, driving/computer work    Modalities:      Charges:  Timed Code Treatment Minutes: 45   Total Treatment Minutes: 45       [] EVAL (LOW) 33870 (typically 20 minutes face-to-face)  [] EVAL (MOD) 31294 (typically 30 minutes face-to-face)  [] EVAL (HIGH) 423 8935 (typically 45 minutes face-to-face)  [] RE-EVAL     [x] JT(15730) x   2  [] DRY NEEDLE 1 OR 2 MUSCLES  [] NMR (50867) x     [] DRY NEEDLE 3+ MUSCLES  [x] Manual (61189) x   1    [] TA (33918) x     [] Mech Traction (38538)  [] ES(attended) (30384)     [] ES (un) (45889):   [] VASO (74533)  [] Other:    If [facility] Please Indicate Time In/Out  CPT Code Time in Time out                                   GOALS:  Patient stated goal: able to use arm again, return to [address] [place] [address]   [] Progressing: [] Met: [] Not Met: [] Adjusted    Therapist goals for Patient:   Short Term Goals: To be achieved in: 2 weeks  1. Independent in HEP and progression per patient tolerance, in order to prevent re-injury. [] Progressing: [] Met: [] Not Met: [] Adjusted  2. Patient will have a decrease in pain to facilitate improvement in movement, function, and ADLs as indicated by Functional Deficits.   [] Progressing: [] Met: [] Not Met: [] Adjusted    Long Term Goals: To be achieved in: 6 weeks  1. Disability index score of 0% or less for the Quick DASH ADLs and sports to assist with reaching prior level of function. [] Progressing: [] Met: [] Not Met: [] Adjusted  2. Patient will demonstrate increased AROM to full supination of forearm to allow for proper joint functioning as indicated by Functional Deficits. [] Progressing: [] Met: [] Not Met: [] Adjusted  3. Patient will demonstrate an increase in NM recruitment/activation and overall GH and scapular strength to within n5lbs HHD or WNL for proper functional mobility as indicated by patients Functional Deficits. [] Progressing: [] Met: [] Not Met: [] Adjusted  4. Patient will return to lifting/reaching/gripping  activities without increased symptoms or restriction. [] Progressing: [] Met: [] Not Met: [] Adjusted  5. Patient will report being able to play [employer] and perform all birding work tasks without increased symptoms or restriction(patient specific functional goal)     [] Progressing: [] Met: [] Not Met: [] Adjusted    ASSESSMENT:  Tolerated all STM to forearm,bicep and wrist extensor with good improvement in joint extensibility and tissue flexibility. Less overall soft tissue restriction noted throughout arm today. Able to increase challenge with all strengthening today. Added light plyo today with increased body weight. Fatigued at conclusion.      Return to Play: (if applicable)   []  Stage 1: Intro to Strength   []  Stage 2: Dynamic Strength and Intro to Plyometrics   []  Stage 3: Advanced Plyometrics and Intro to Throwing   []  Stage 4: Sport specific Training/Return to Sport     []  Ready to Return to Play, [employer]   []  Not Ready for Return to Sports   Comments:      Treatment/Activity Tolerance:  [x] Patient tolerated treatment well [] Patient limited by fatique  [] Patient limited by pain  [] Patient limited by other medical complications  [] Other:     Overall Progression Towards Functional goals/ Treatment Progress Update:  [x] Patient is progressing as expected towards functional goals listed. [] Progression is slowed due to complexities/Impairments listed. [] Progression has been slowed due to co-morbidities. [] Plan just implemented, too soon to assess goals progression <30days   [] Goals require adjustment due to lack of progress  [] Patient is not progressing as expected and requires additional follow up with physician  [] Other    Prognosis for POC: [x] Good [] Fair  [] Poor    Patient requires continued skilled intervention: [x] Yes  [] No      PLAN: Continue 2x week  [x] Continue per plan of care [] Alter current plan (see comments)  [] Plan of care initiated [] Hold pending MD visit [] Discharge    Electronically signed by: Ke Hare PT     Note: If patient does not return for scheduled/recommended follow up visits, this note will serve as a discharge from care along with the most recent update on progress.

## 2023-03-14 ENCOUNTER — APPOINTMENT (OUTPATIENT)
Dept: PHYSICAL THERAPY | Age: 63
End: 2023-03-14
Payer: COMMERCIAL

## 2023-03-16 ENCOUNTER — HOSPITAL ENCOUNTER (OUTPATIENT)
Dept: PHYSICAL THERAPY | Age: 63
Setting detail: THERAPIES SERIES
Discharge: HOME | End: 2023-03-16
Payer: COMMERCIAL

## 2023-03-16 NOTE — FLOWSHEET NOTE
[facility]    Physical Therapy  Cancellation/No-show Note  Patient Name:  Jenny Jacobson  :  1960   Date:  3/16/2023  Cancelled visits to date: 1  No-shows to date: 0    For today's appointment patient:  [x]  Cancelled  []  Rescheduled appointment  []  No-show     Reason given by patient:  []  Patient ill  []  Conflicting appointment  []  No transportation    [x]  Conflict with work  []  No reason given  []  Other:     Comments:      Electronically signed by:  Luis Carlos Metcalf PT, PT

## 2023-03-21 ENCOUNTER — HOSPITAL ENCOUNTER (OUTPATIENT)
Dept: PHYSICAL THERAPY | Age: 63
Setting detail: THERAPIES SERIES
Discharge: HOME | End: 2023-03-21
Payer: COMMERCIAL

## 2023-03-21 PROCEDURE — 97140 MANUAL THERAPY 1/> REGIONS: CPT

## 2023-03-21 NOTE — FLOWSHEET NOTE
[facility] [address] [facility]  Phone: [phone] Fax: [phone]    Physical Therapy Treatment Note/ Progress Report:       Date:  2023    Patient Name:  Malena Miranda    :  1960  MRN: [id number]  Restrictions/Precautions:    Medical/Treatment Diagnosis Information:  Diagnosis: R bicep tendon rupture, R elbow pain  Treatment Diagnosis: R bicep tendon rupture M66.821, R elbow pain K34.994  Insurance/Certification information:  PT Insurance Information: [employer]  Physician Information:  Jonna Melton MD  Plan of care signed (Y/N):     Date of Patient follow up with Physician:      Progress Report: [x]  Yes  []  No     Date Range for reporting period:  Beginnin2023  Ending:     Progress report due (10 Rx/or 30 days whichever is less):      Recertification due (POC duration/ or 90 days whichever is less): 2023     Visit # Insurance Allowable Auth Needed    9 (8 of 11 approved thru 2023) [place] (needs auth) [x]Yes    []No     Pain level:  0/10     SUBJECTIVE:  Patient reports that he did 50 minutes of shooting basketball before coming in. Feeling very fatigued in elbow, but feeling more soreness in top of shoulder and bicep.         OBJECTIVE: See eval  Observation:   Test measurements:      RESTRICTIONS/PRECAUTIONS: distal bicep repair 2022 - currently 6 weeks post op    Exercises/Interventions:   Therapeutic Ex (75270)  Min: 0 Sets/sec Reps CUES/Notes   UBE 1 5 retro   Wrist flexion 2 10 6 lb   Wrist extension 2 10 6 lb   Forearm supination/pronation 2 10 6 lb   T- band Row/pinch      T- band lower pinch      Tricep extension - at side and skull  0  8 lb   Swiss ball pertubation push off from wall 3 10    BOSU wall crash 2 10    Bicep curl 2 10 8 lbl   Hammercurl 2 10 8 lb   Pronation curl 2 10 8 lb   SL Rip stick press 0  R   Straight arm press with purple band perturbations

## 2023-03-23 ENCOUNTER — APPOINTMENT (OUTPATIENT)
Dept: PHYSICAL THERAPY | Age: 63
End: 2023-03-23
Payer: COMMERCIAL

## 2023-03-28 ENCOUNTER — HOSPITAL ENCOUNTER (OUTPATIENT)
Dept: PHYSICAL THERAPY | Age: 63
Setting detail: THERAPIES SERIES
Discharge: HOME | End: 2023-03-28
Payer: COMMERCIAL

## 2023-03-28 NOTE — FLOWSHEET NOTE
[facility]    Physical Therapy  Cancellation/No-show Note  Patient Name:  Gabriella Albright  :  1960   Date:  3/28/2023  Cancelled visits to date: 2  No-shows to date: 0    For today's appointment patient:  [x]  Cancelled  []  Rescheduled appointment  []  No-show     Reason given by patient:  []  Patient ill  []  Conflicting appointment  []  No transportation    []  Conflict with work  []  No reason given  [x]  Other:  has to go  his wife     Comments:      Electronically signed by:  Sameer Garcia, PT, DPT

## 2023-03-30 ENCOUNTER — HOSPITAL ENCOUNTER (OUTPATIENT)
Dept: PHYSICAL THERAPY | Age: 63
Setting detail: THERAPIES SERIES
Discharge: HOME | End: 2023-03-30
Payer: COMMERCIAL

## 2023-03-30 PROCEDURE — 97110 THERAPEUTIC EXERCISES: CPT

## 2023-03-30 PROCEDURE — 97140 MANUAL THERAPY 1/> REGIONS: CPT

## 2023-03-30 NOTE — FLOWSHEET NOTE
[] Met: [] Not Met: [] Adjusted    Long Term Goals: To be achieved in: 6 weeks  1. Disability index score of 0% or less for the Quick DASH ADLs and sports to assist with reaching prior level of function. [] Progressing: [] Met: [] Not Met: [] Adjusted  2. Patient will demonstrate increased AROM to full supination of forearm to allow for proper joint functioning as indicated by Functional Deficits. [] Progressing: [] Met: [] Not Met: [] Adjusted  3. Patient will demonstrate an increase in NM recruitment/activation and overall GH and scapular strength to within n5lbs HHD or WNL for proper functional mobility as indicated by patients Functional Deficits. [] Progressing: [] Met: [] Not Met: [] Adjusted  4. Patient will return to lifting/reaching/gripping  activities without increased symptoms or restriction. [] Progressing: [] Met: [] Not Met: [] Adjusted  5. Patient will report being able to play [employer] and perform all birding work tasks without increased symptoms or restriction(patient specific functional goal)     [] Progressing: [] Met: [] Not Met: [] Adjusted    ASSESSMENT:  Tolerated all STM to shoulder, forearm,bicep and wrist extensor with good improvement in joint extensibility and tissue flexibility. Tight in achilles and throughout plantar fascia with good improvement. Did well with all strengthening today- progressed overall resistance with exercises today.       Return to Play: (if applicable)   []  Stage 1: Intro to Strength   []  Stage 2: Dynamic Strength and Intro to Plyometrics   []  Stage 3: Advanced Plyometrics and Intro to Throwing   []  Stage 4: Sport specific Training/Return to Sport     []  Ready to Return to Play, [employer]   []  Not Ready for Return to Sports   Comments:      Treatment/Activity Tolerance:  [x] Patient tolerated treatment well [] Patient limited by fatique  [] Patient limited by pain  [] Patient limited by other medical complications  [] Other:     Overall

## 2023-04-06 ENCOUNTER — APPOINTMENT (OUTPATIENT)
Dept: PHYSICAL THERAPY | Age: 63
End: 2023-04-06
Payer: COMMERCIAL

## 2023-04-18 ENCOUNTER — HOSPITAL ENCOUNTER (OUTPATIENT)
Dept: PHYSICAL THERAPY | Age: 63
Setting detail: THERAPIES SERIES
Discharge: HOME | End: 2023-04-18
Payer: COMMERCIAL

## 2023-04-18 PROCEDURE — 97161 PT EVAL LOW COMPLEX 20 MIN: CPT

## 2023-04-18 NOTE — PLAN OF CARE
agents, E-stim, Biofeedback, US, iontophoresis as indicated  [x] Patient education on joint protection, postural re-education, activity modification, progression of HEP. HEP instruction: (see scanned forms)    GOALS:  Patient stated goal: return to being able to play basketball without any limitations  [] Progressing: [] Met: [] Not Met: [] Adjusted    Therapist goals for Patient:   Short Term Goals: To be achieved in: 2 weeks  1. Independent in HEP and progression per patient tolerance, in order to prevent re-injury. [] Progressing: [] Met: [] Not Met: [] Adjusted  2. Patient will have a decrease in pain to facilitate improvement in movement, function, and ADLs as indicated by Functional Deficits. [] Progressing: [] Met: [] Not Met: [] Adjusted  3. Patient will report pain no greater than 1-2/10 when waking and taking first steps in the morning. [] Progressing: [] Met: [] Not Met: [] Adjusted    Long Term Goals: To be achieved in: 6 weeks  1. Disability index score of 20% or less for the LEFS to assist with reaching prior level of function. [] Progressing: [] Met: [] Not Met: [] Adjusted  2. Patient will demonstrate increased AROM to 0-15 degrees of ankle DF to allow for patient to achieve proper amount of tibial translation over TC joint for improved management of stairs, walking and running. [] Progressing: [] Met: [] Not Met: [] Adjusted  3. Patient will demonstrate an increase in gastroc strength to equal contralateral to allow patient good push off with during ambulation to allow patient to walk dog and perform birding duties which require walking of at least 5+ miles daily. [] Progressing: [] Met: [] Not Met: [] Adjusted  4. Patient will be able to stand for greater than 1 hour,  walk dog and perform bird banding duties, requiring at minimum of 5+ miles daily, with pain no greater than 1-2/10 at conclusion of day. [] Progressing: [] Met: [] Not Met: [] Adjusted  5.  Patient will be able to

## 2023-04-18 NOTE — FLOWSHEET NOTE
for activities related to improving balance, coordination, kinesthetic sense, posture, motor skill, proprioception  to assist with LE, proximal hip, and core control in self care, mobility, lifting, ambulation and eccentric single leg control. NMR and Therapeutic Activities:    [x] (20291 or 26100) Provided verbal/tactile cueing for activities related to improving balance, coordination, kinesthetic sense, posture, motor skill, proprioception and motor activation to allow for proper function of core, proximal hip and LE with self care and ADLs and functional mobility.   [] (54582) Gait Re-education- Provided training and instruction to the patient for proper LE, core and proximal hip recruitment and positioning and eccentric body weight control with ambulation re-education including up and down stairs     Home Exercise Program:    [x] (56050) Reviewed/Progressed HEP activities related to strengthening, flexibility, endurance, ROM of core, proximal hip and LE for functional self-care, mobility, lifting and ambulation/stair navigation   [] (16530)Reviewed/Progressed HEP activities related to improving balance, coordination, kinesthetic sense, posture, motor skill, proprioception of core, proximal hip and LE for self care, mobility, lifting, and ambulation/stair navigation      Manual Treatments:  PROM / STM / Oscillations-Mobs:  G-I, II, III, IV (PA's, Inf., Post.)  [] (50228) Provided manual therapy to mobilize LE, proximal hip and/or LS spine soft tissue/joints for the purpose of modulating pain, promoting relaxation,  increasing ROM, reducing/eliminating soft tissue swelling/inflammation/restriction, improving soft tissue extensibility and allowing for proper ROM for normal function with self care, mobility, lifting and ambulation. Modalities:     [] GAME READY (VASO)- for significant edema, swelling, pain control.      Charges:  Timed Code Treatment Minutes: 15   Total Treatment Minutes: 45      [x] KATHI

## 2023-04-20 ENCOUNTER — HOSPITAL ENCOUNTER (OUTPATIENT)
Dept: PHYSICAL THERAPY | Age: 63
Setting detail: THERAPIES SERIES
Discharge: HOME | End: 2023-04-20
Payer: COMMERCIAL

## 2023-04-20 PROCEDURE — 97110 THERAPEUTIC EXERCISES: CPT

## 2023-04-20 PROCEDURE — 97140 MANUAL THERAPY 1/> REGIONS: CPT

## 2023-04-20 NOTE — FLOWSHEET NOTE
goals progression <30days   [] Goals require adjustment due to lack of progress  [] Patient is not progressing as expected and requires additional follow up with physician  [] Other    Prognosis for POC: [x] Good [] Fair  [] Poor    Patient requires continued skilled intervention: [x] Yes  [] No        PLAN: See eval  [] Continue per plan of care [] Alter current plan (see comments)  [x] Plan of care initiated [] Hold pending MD visit [] Discharge    Electronically signed by: Joseph Pitts PT    Note: If patient does not return for scheduled/recommended follow up visits, this note will serve as a discharge from care along with the most recent update on progress.

## 2023-04-25 ENCOUNTER — HOSPITAL ENCOUNTER (OUTPATIENT)
Dept: PHYSICAL THERAPY | Age: 63
Setting detail: THERAPIES SERIES
Discharge: HOME | End: 2023-04-25
Payer: COMMERCIAL

## 2023-04-25 NOTE — FLOWSHEET NOTE
[facility]    Physical Therapy  Cancellation/No-show Note  Patient Name:  Braxton Ambriz  :  1960   Date:  2023  Cancelled visits to date: 1  No-shows to date: 0    For today's appointment patient:  [x]  Cancelled  []  Rescheduled appointment  []  No-show     Reason given by patient:  []  Patient ill  []  Conflicting appointment  []  No transportation    [x]  Conflict with work  []  No reason given  []  Other:     Comments:      Electronically signed by:  Junior Abel PT, PT

## 2023-04-27 ENCOUNTER — HOSPITAL ENCOUNTER (OUTPATIENT)
Dept: PHYSICAL THERAPY | Age: 63
Setting detail: THERAPIES SERIES
Discharge: HOME | End: 2023-04-27
Payer: COMMERCIAL

## 2023-04-27 PROCEDURE — 97110 THERAPEUTIC EXERCISES: CPT

## 2023-04-27 PROCEDURE — 97140 MANUAL THERAPY 1/> REGIONS: CPT

## 2023-04-27 NOTE — FLOWSHEET NOTE
[place] [address] [facility]  Phone: [phone] Fax: [phone]    Physical Therapy Treatment Note/ Progress Report:       Date:  2023    Patient Name:  Cristian Ruiz    :  1960  MRN: [id number]  Restrictions/Precautions:    Medical/Treatment Diagnosis Information:  Diagnosis: L plantar fascitis, L foot pain  Treatment Diagnosis: L plantar fascitis M72.2, L foot pain M66.021  Insurance/Certification information:  PT Insurance Information: [place]/[employer]  Physician Information:  Desire Mcdaniel MD  Plan of care signed (Y/N):     Date of Patient follow up with Physician:      Progress Report: [x]  Yes  []  No     Date Range for reporting period:  Beginnin2023  Ending:     Progress report due (10 Rx/or 30 days whichever is less):      Recertification due (POC duration/ or 90 days whichever is less): 2023     Visit # Insurance Allowable Auth Needed   3 (2 of 8 approved) [place] [x]Yes    []No     Latex Allergy:  [x]NO      []YES  Preferred Language for Healthcare:   [x]English       []other:  Functional Scale: LEFS 33.75% Date assessed:2023    Pain level:  2-6/10     SUBJECTIVE:  Reports that foot feels good for about a day after DN. Did a lot on  with playing basketball and after foot cooled down he had increased pain. Did have orthotics in shoes while playing. States that his overall daily mileage amount is feeling better.      OBJECTIVE: See eval  Observation:   Test measurements:      RESTRICTIONS/PRECAUTIONS: none    Exercises/Interventions:     Therapeutic Ex (64217)   Min: 15 Sets/sec Reps Notes/CUES   Slant board gastroc stretch 30 4    Wobble board 2 10    Corner rotation 1 10    Double HR- single eccentric 1 10 Very challenging   Post tib activation for arch 1 10    SLS 10 10                BOSU fwd/side lunge      BOSU squat      Leg Press Iso/Con/Ecc 0-      Cybex

## 2023-05-02 ENCOUNTER — HOSPITAL ENCOUNTER (OUTPATIENT)
Dept: PHYSICAL THERAPY | Age: 63
Setting detail: THERAPIES SERIES
Discharge: HOME | End: 2023-05-02
Payer: COMMERCIAL

## 2023-05-02 PROCEDURE — 97110 THERAPEUTIC EXERCISES: CPT

## 2023-05-02 PROCEDURE — 97140 MANUAL THERAPY 1/> REGIONS: CPT

## 2023-05-02 NOTE — FLOWSHEET NOTE
have a decrease in pain to facilitate improvement in movement, function, and ADLs as indicated by Functional Deficits. [] Progressing: [] Met: [] Not Met: [] Adjusted  3. Patient will report pain no greater than 1-2/10 when waking and taking first steps in the morning. [] Progressing: [] Met: [] Not Met: [] Adjusted    Long Term Goals: To be achieved in: 6 weeks  1. Disability index score of 20% or less for the LEFS to assist with reaching prior level of function. [] Progressing: [] Met: [] Not Met: [] Adjusted  2. Patient will demonstrate increased AROM to 0-15 degrees of ankle DF to allow for patient to achieve proper amount of tibial translation over TC joint for improved management of stairs, walking and running. [] Progressing: [] Met: [] Not Met: [] Adjusted  3. Patient will demonstrate an increase in gastroc strength to equal contralateral to allow patient good push off with during ambulation to allow patient to walk dog and perform birding duties which require walking of at least 5+ miles daily. [] Progressing: [] Met: [] Not Met: [] Adjusted  4. Patient will be able to stand for greater than 1 hour,  walk dog and perform bird banding duties, requiring at minimum of 5+ miles daily, with pain no greater than 1-2/10 at conclusion of day. [] Progressing: [] Met: [] Not Met: [] Adjusted  5. Patient will be able to play at least 60 minutes of basketball, requiring running and cutting, with pain no greater than 1-2/10 at conclusion of playing. (patient specific functional goal)    6. Patient will be able to ascend/descend 2 flights of stairs (total of 14 steps per flight) with ankle DF of 10-15 degrees to allow for improved ability to manage steps with decreased pain. [] Progressing: [] Met: [] Not Met: [] Adjusted    ASSESSMENT:  Good tolerance to soft tissue mobilization/joint mobilization to ankle. Not as sore in plantar aspect of foot as well as gastroc.   Patient with increased pain with heel

## 2023-05-04 ENCOUNTER — HOSPITAL ENCOUNTER (OUTPATIENT)
Dept: PHYSICAL THERAPY | Age: 63
Setting detail: THERAPIES SERIES
Discharge: HOME | End: 2023-05-04
Payer: COMMERCIAL

## 2023-05-04 PROCEDURE — 97140 MANUAL THERAPY 1/> REGIONS: CPT

## 2023-05-04 PROCEDURE — 97110 THERAPEUTIC EXERCISES: CPT

## 2023-05-04 NOTE — FLOWSHEET NOTE
[place] [address] [address] [place]  Phone: [phone] Fax: [phone]    Physical Therapy Treatment Note/ Progress Report:       Date:  2023    Patient Name:  Leonarda Mercado    :  1960  MRN: [id number]  Restrictions/Precautions:    Medical/Treatment Diagnosis Information:  Diagnosis: L plantar fascitis, L foot pain  Treatment Diagnosis: L plantar fascitis M72.2, L foot pain C31.414  Insurance/Certification information:  PT Insurance Information: [place]/[employer]  Physician Information:  Jessica Prather MD  Plan of care signed (Y/N):     Date of Patient follow up with Physician:      Progress Report: [x]  Yes  []  No     Date Range for reporting period:  Beginnin2023  Ending:     Progress report due (10 Rx/or 30 days whichever is less):      Recertification due (POC duration/ or 90 days whichever is less): 2023     Visit # Insurance Allowable Auth Needed   5 (4 of 8 approved) [place] [x]Yes    []No     Latex Allergy:  [x]NO      []YES  Preferred Language for Healthcare:   [x]English       []other:  Functional Scale: LEFS 33.75% Date assessed:2023    Pain level:  2/10     SUBJECTIVE:  Reports that foot feels like it is getting better. Reports that foot not AS sore in boot today after 7 hours of walking- however is still sore (5/10). Currently not having any soreness at all.        OBJECTIVE: See eval  Observation:   Test measurements:      RESTRICTIONS/PRECAUTIONS: none    Exercises/Interventions:     Therapeutic Ex (86892)   Min: 25 Sets/sec Reps Notes/CUES   Slant board gastroc stretch 30 4    Wobble board 2 10    Corner rotation 1 10    Double HR- single eccentric 1 10 Very challenging   Post tib activation for arch 1 10    SLS 10 10    Posterior heel mobility 1 10    Walking with increased push off from great toe 1 10    BOSU fwd/side lunge      BOSU squat      Leg Press Iso/Con/Ecc 0-

## 2023-05-16 ENCOUNTER — HOSPITAL ENCOUNTER (OUTPATIENT)
Dept: PHYSICAL THERAPY | Age: 63
Setting detail: THERAPIES SERIES
Discharge: HOME | End: 2023-05-16
Payer: COMMERCIAL

## 2023-05-16 PROCEDURE — 97140 MANUAL THERAPY 1/> REGIONS: CPT

## 2023-05-16 PROCEDURE — 97110 THERAPEUTIC EXERCISES: CPT

## 2023-05-16 NOTE — FLOWSHEET NOTE
RDL      Slide Lunge      Slide HS eccentrics      Step ups/ecc step down      Swissball wall rolls- in SLS- hip drive      Quad hip ext/wall-ball rolls                  Manual Intervention (94441)  Min: 15      DN      ESTIM      STM  10 Gastroc/soleus, plantar surface   TC joint, navicular and mid foot joint mobs  5                NMR re-education (64179)  Min:   CUES NEEDED   Swiss/Biofeedback 10/10      BFR      G. Med activation      Hip Ext full ROM/ G. Activation      Bosu Bal and Prop- G Med      Single leg stance/Balance/Prop      Bosu Retro G. Med act      Prone Hip froggers- sliders/elevated            Therapeutic Activity (44476)  Min:      Ladders      P[place]      Dynamic Balance                                Therapeutic Exercise and NMR EXR  [x] (65546) Provided verbal/tactile cueing for activities related to strengthening, flexibility, endurance, ROM for improvements in LE, proximal hip, and core control with self care, mobility, lifting, ambulation. [x] (84103) Provided verbal/tactile cueing for activities related to improving balance, coordination, kinesthetic sense, posture, motor skill, proprioception  to assist with LE, proximal hip, and core control in self care, mobility, lifting, ambulation and eccentric single leg control.      NMR and Therapeutic Activities:    [x] (26713 or 07044) Provided verbal/tactile cueing for activities related to improving balance, coordination, kinesthetic sense, posture, motor skill, proprioception and motor activation to allow for proper function of core, proximal hip and LE with self care and ADLs and functional mobility.   [] (12276) Gait Re-education- Provided training and instruction to the patient for proper LE, core and proximal hip recruitment and positioning and eccentric body weight control with ambulation re-education including up and down stairs     Home Exercise Program:    [x] (59745) Reviewed/Progressed HEP activities related to strengthening,

## 2023-05-18 ENCOUNTER — HOSPITAL ENCOUNTER (OUTPATIENT)
Dept: PHYSICAL THERAPY | Age: 63
Setting detail: THERAPIES SERIES
Discharge: HOME | End: 2023-05-18
Payer: COMMERCIAL

## 2023-05-18 NOTE — FLOWSHEET NOTE
[facility]    Physical Therapy  Cancellation/No-show Note  Patient Name:  Braxton Ambriz  :  1960   Date:  2023  Cancelled visits to date: 1  No-shows to date: 1    For today's appointment patient:  []  Cancelled  []  Rescheduled appointment  [x]  No-show     Reason given by patient:  []  Patient ill  []  Conflicting appointment  []  No transportation    []  Conflict with work  []  No reason given  []  Other:     Comments:      Electronically signed by:  Junior Abel, PT, DPT

## 2024-07-01 ENCOUNTER — HOSPITAL ENCOUNTER (OUTPATIENT)
Dept: PHYSICAL THERAPY | Age: 64
Setting detail: THERAPIES SERIES
Discharge: HOME | End: 2024-07-01
Payer: COMMERCIAL

## 2024-07-01 PROCEDURE — 97161 PT EVAL LOW COMPLEX 20 MIN: CPT

## 2024-07-01 NOTE — PLAN OF CARE
to co-morbidities.  [x] Plan just implemented, too soon (<30days) to assess goals progression   [] Goals require adjustment due to lack of progress  [] Patient is not progressing as expected and requires additional follow up with physician  [] Other:     TREATMENT PLAN     Frequency/Duration: 2x/week for 8 weeks for the following treatment interventions:    Interventions:  Therapeutic Exercise (97984) including: strength training, ROM, and functional mobility  Therapeutic Activities (74748) including: functional mobility training and education.  Neuromuscular Re-education (94263) activation and proprioception, including postural re-education.    Gait Training (20933) for normalization of ambulation patterns and AD training.   Manual Therapy (61793) as indicated to include: Passive Range of Motion, Gr I-IV mobilizations, Grade V Manipulation, Soft Tissue Mobilization, and Trigger Point Release  Modalities as needed that may include: NONE  Patient education on joint protection, postural re-education, activity modification, and progression of HEP    Plan: POC initiated as per evaluation    Electronically Signed by Ingrid Moore PT  Date: 07/01/2024     Note: Portions of this note have been templated and/or copied from initial evaluation, reassessments and prior notes for documentation efficiency.    Note: If patient does not return for scheduled/recommended follow up visits, this note will serve as a discharge from care along with the most recent update on progress.

## 2024-07-03 ENCOUNTER — HOSPITAL ENCOUNTER (OUTPATIENT)
Dept: PHYSICAL THERAPY | Age: 64
Setting detail: THERAPIES SERIES
End: 2024-07-03
Payer: COMMERCIAL

## 2024-07-08 ENCOUNTER — HOSPITAL ENCOUNTER (OUTPATIENT)
Dept: PHYSICAL THERAPY | Age: 64
Setting detail: THERAPIES SERIES
End: 2024-07-08
Payer: COMMERCIAL

## 2024-07-10 ENCOUNTER — HOSPITAL ENCOUNTER (OUTPATIENT)
Dept: PHYSICAL THERAPY | Age: 64
Setting detail: THERAPIES SERIES
Discharge: HOME | End: 2024-07-10
Payer: COMMERCIAL

## 2024-07-10 PROCEDURE — 97140 MANUAL THERAPY 1/> REGIONS: CPT

## 2024-07-10 PROCEDURE — 97110 THERAPEUTIC EXERCISES: CPT

## 2024-07-10 NOTE — FLOWSHEET NOTE
functional mobility to enable patient to return to lifting and hiking in woods.   [] Progressing: [] Met: [] Not Met: [] Adjusted  4. Patient will return to walking and standing for prolonged periods without increased symptoms or restriction.   [] Progressing: [] Met: [] Not Met: [] Adjusted  5. Patient will report being able to return to playing basketball without increased symptoms or restriction. (patient specific functional goal)    [] Progressing: [] Met: [] Not Met: [] Adjusted     Overall Progression Towards Functional goals/ Treatment Progress Update:  [] Patient is progressing as expected towards functional goals listed.    [] Progression is slowed due to complexities/Impairments listed.  [] Progression has been slowed due to co-morbidities.  [x] Plan just implemented, too soon (<30days) to assess goals progression   [] Goals require adjustment due to lack of progress  [] Patient is not progressing as expected and requires additional follow up with physician  [] Other:     TREATMENT PLAN     Frequency/Duration: 2x/week for 8 weeks for the following treatment interventions:    Interventions:  Therapeutic Exercise (61450) including: strength training, ROM, and functional mobility  Therapeutic Activities (76443) including: functional mobility training and education.  Neuromuscular Re-education (38467) activation and proprioception, including postural re-education.    Gait Training (33674) for normalization of ambulation patterns and AD training.   Manual Therapy (77774) as indicated to include: Passive Range of Motion, Gr I-IV mobilizations, Grade V Manipulation, Soft Tissue Mobilization, and Trigger Point Release  Modalities as needed that may include: NONE  Patient education on joint protection, postural re-education, activity modification, and progression of HEP    Plan: POC initiated as per evaluation    Electronically Signed by Ingrid Moore PT  Date: 07/10/2024     Note: Portions of this note have been

## 2024-07-15 ENCOUNTER — APPOINTMENT (OUTPATIENT)
Dept: PHYSICAL THERAPY | Age: 64
End: 2024-07-15
Payer: COMMERCIAL

## 2024-07-17 ENCOUNTER — APPOINTMENT (OUTPATIENT)
Dept: PHYSICAL THERAPY | Age: 64
End: 2024-07-17
Payer: COMMERCIAL

## 2024-07-22 ENCOUNTER — HOSPITAL ENCOUNTER (OUTPATIENT)
Dept: PHYSICAL THERAPY | Age: 64
Setting detail: THERAPIES SERIES
Discharge: HOME | End: 2024-07-22
Payer: COMMERCIAL

## 2024-07-22 PROCEDURE — 97110 THERAPEUTIC EXERCISES: CPT

## 2024-07-22 NOTE — FLOWSHEET NOTE
weeks  1. Disability index score of 20% or less for the Modified Oswestry to assist with reaching prior level of function with activities such as lifting and standing.  [] Progressing: [] Met: [] Not Met: [] Adjusted  2. Patient will demonstrate increased AROM of trunk flexion and SB to equal to CL and L hip IR to equal to CL without pain to allow for proper joint functioning to enable patient to bend forward and rotate to  objects.   [] Progressing: [] Met: [] Not Met: [] Adjusted  3. Patient will demonstrate increased Strength of core and proximal hip to within 5lb with HHD of contralateral limb to allow for proper functional mobility to enable patient to return to lifting and hiking in woods.   [] Progressing: [] Met: [] Not Met: [] Adjusted  4. Patient will return to walking and standing for prolonged periods without increased symptoms or restriction.   [] Progressing: [] Met: [] Not Met: [] Adjusted  5. Patient will report being able to return to playing basketball without increased symptoms or restriction. (patient specific functional goal)    [] Progressing: [] Met: [] Not Met: [] Adjusted     Overall Progression Towards Functional goals/ Treatment Progress Update:  [] Patient is progressing as expected towards functional goals listed.    [] Progression is slowed due to complexities/Impairments listed.  [] Progression has been slowed due to co-morbidities.  [x] Plan just implemented, too soon (<30days) to assess goals progression   [] Goals require adjustment due to lack of progress  [] Patient is not progressing as expected and requires additional follow up with physician  [] Other:     TREATMENT PLAN     Frequency/Duration: 2x/week for 8 weeks for the following treatment interventions:    Interventions:  Therapeutic Exercise (07070) including: strength training, ROM, and functional mobility  Therapeutic Activities (71914) including: functional mobility training and education.  Neuromuscular

## 2024-07-24 ENCOUNTER — HOSPITAL ENCOUNTER (OUTPATIENT)
Dept: PHYSICAL THERAPY | Age: 64
Setting detail: THERAPIES SERIES
Discharge: HOME | End: 2024-07-24
Payer: COMMERCIAL

## 2024-07-24 PROCEDURE — 97110 THERAPEUTIC EXERCISES: CPT

## 2024-07-24 PROCEDURE — 97140 MANUAL THERAPY 1/> REGIONS: CPT

## 2024-07-24 NOTE — FLOWSHEET NOTE
[facility] - Outpatient Rehabilitation and Therapy [address] [place] [id number] office: [phone] fax: [phone]       Physical Therapy: TREATMENT/PROGRESS NOTE   Patient: Kit Vergara (63 y.o. male)   Examination Date: 2024   :  1960 MRN: [id number]   Visit #: 4   Insurance Allowable Auth Needed   [place], 60/yr  (1 of 13 approved) [x]Yes    []No    Insurance: Payor: BCBS / Plan: BCBS - [place] PPO / Product Type: *No Product type* /   Insurance ID: [id number] - ([place] BCBS)  Secondary Insurance (if applicable):    Treatment Diagnosis: low back pain M54.5, debility and weakness R53.1, decreased endurance R53.83  No diagnosis found.   Medical Diagnosis:  Low back pain [M54.50], debility and weakness R53.1   Referring Physician: Brent Schneider APRN *  PCP: Rafael Fisher MD     Plan of care signed (Y/N):     Date of Patient follow up with Physician:      Progress Report/POC: NO  POC update due: (10 visits /OR AUTH LIMITS, whichever is less)  2024                                             Precautions/ Contra-indications:           Latex allergy:  NO  Pacemaker:    NO  Contraindications for Manipulation: recent surgical history (relative)  Date of Surgery: 2 weeks ago  Other:    Red Flags:  Fevers, chills, night sweats  Visceral pain    C-SSRS Triggered by Intake questionnaire:   Patient answered 'NO' to both behavioral questions on intake.  No further screening warranted    Preferred Language for Healthcare:   [x] English       [] other:    SUBJECTIVE EXAMINATION     Patient stated complaint: Patient states he was tired but not excessively after last session. States he has been very busy and active cleaning out books and walking his dogs, would like to get back to more lifting but is unsure of his limitations after surgery. States his side pain is less and he is breathing better. Has tightness in neck and upper back.     Test used Initial score  24

## 2024-07-31 ENCOUNTER — HOSPITAL ENCOUNTER (OUTPATIENT)
Dept: PHYSICAL THERAPY | Age: 64
Setting detail: THERAPIES SERIES
Discharge: HOME | End: 2024-07-31
Payer: COMMERCIAL

## 2024-07-31 PROCEDURE — 97140 MANUAL THERAPY 1/> REGIONS: CPT

## 2024-07-31 PROCEDURE — 97112 NEUROMUSCULAR REEDUCATION: CPT

## 2024-07-31 NOTE — FLOWSHEET NOTE
[facility] - Outpatient Rehabilitation and Therapy [address] [place] [address] office: [phone] fax: [phone]       Physical Therapy: TREATMENT/PROGRESS NOTE   Patient: Kit Vergara (63 y.o. male)   Examination Date: 2024   :  1960 MRN: [id number]   Visit #: 5   Insurance Allowable Auth Needed   [place], 60/yr  ( 4 of 13 approved) [x]Yes    []No    Insurance: Payor: BCBS / Plan: BCBS - [place] PPO / Product Type: *No Product type* /   Insurance ID: [id number] - ([place] BCBS)  Secondary Insurance (if applicable):    Treatment Diagnosis: low back pain M54.5, debility and weakness R53.1, decreased endurance R53.83  No diagnosis found.   Medical Diagnosis:  Low back pain [M54.50], debility and weakness R53.1   Referring Physician: Brent Schneider APRN *  PCP: Rafael Fisher MD     Plan of care signed (Y/N):     Date of Patient follow up with Physician:      Progress Report/POC: NO  POC update due: (10 visits /OR AUTH LIMITS, whichever is less)  2024                                             Precautions/ Contra-indications:           Latex allergy:  NO  Pacemaker:    NO  Contraindications for Manipulation: recent surgical history (relative)  Date of Surgery: 2 weeks ago  Other:    Red Flags:  Fevers, chills, night sweats  Visceral pain    C-SSRS Triggered by Intake questionnaire:   Patient answered 'NO' to both behavioral questions on intake.  No further screening warranted    Preferred Language for Healthcare:   [x] English       [] other:    SUBJECTIVE EXAMINATION     Patient stated complaint: Patient states he was tired but not excessively after last session. Notes that he had to walk wagon with weight up/down hill to banding station. Was able to walk 19K steps yesterday. Notes that he feels tight in his upper and lower back, as well as feels like his balance still is struggling.      Test used Initial score  2024   Pain Summary VAS 3/10 2/10 mid

## 2024-08-02 ENCOUNTER — HOSPITAL ENCOUNTER (OUTPATIENT)
Dept: PHYSICAL THERAPY | Age: 64
Setting detail: THERAPIES SERIES
Discharge: HOME | End: 2024-08-02
Payer: COMMERCIAL

## 2024-08-02 PROCEDURE — 97140 MANUAL THERAPY 1/> REGIONS: CPT

## 2024-08-02 NOTE — FLOWSHEET NOTE
[facility] - Outpatient Rehabilitation and Therapy [address] [place] [id number] office: [phone] fax: [phone]       Physical Therapy: TREATMENT/PROGRESS NOTE   Patient: Kit Vergara (63 y.o. male)   Examination Date: 2024   :  1960 MRN: [id number]   Visit #: 6   Insurance Allowable Auth Needed   [place], 60/yr  ( 5 of 13 approved) [x]Yes    []No    Insurance: Payor: BCBS / Plan: BCBS - [place] PPO / Product Type: *No Product type* /   Insurance ID: [id number] - ([place] [employer])  Secondary Insurance (if applicable):    Treatment Diagnosis: low back pain M54.5, debility and weakness R53.1, decreased endurance R53.83  No diagnosis found.   Medical Diagnosis:  Low back pain [M54.50], debility and weakness R53.1   Referring Physician: Brent Schneider APRN *  PCP: Rafael Fisher MD     Plan of care signed (Y/N):     Date of Patient follow up with Physician:      Progress Report/POC: NO  POC update due: (10 visits /OR AUTH LIMITS, whichever is less)  2024                                             Precautions/ Contra-indications:           Latex allergy:  NO  Pacemaker:    NO  Contraindications for Manipulation: recent surgical history (relative)  Date of Surgery: 2 weeks ago  Other:    Red Flags:  Fevers, chills, night sweats  Visceral pain    C-SSRS Triggered by Intake questionnaire:   Patient answered 'NO' to both behavioral questions on intake.  No further screening warranted    Preferred Language for Healthcare:   [x] English       [] other:    SUBJECTIVE EXAMINATION     Patient stated complaint: Patient states he felt pretty good after last session. Has been doing a lot outside of PT. Would like to have neck and back worked on as he banded for about 3 hours yesterday and very tight/sore from this.       Test used Initial score  2024   Pain Summary VAS 3/10 2/10 mid back   Functional questionnaire Modified Oswestry 42% disability    Other:

## 2024-08-14 ENCOUNTER — HOSPITAL ENCOUNTER (OUTPATIENT)
Dept: PHYSICAL THERAPY | Age: 64
Setting detail: THERAPIES SERIES
Discharge: HOME | End: 2024-08-14
Payer: COMMERCIAL

## 2024-08-14 PROCEDURE — 97140 MANUAL THERAPY 1/> REGIONS: CPT

## 2024-08-14 NOTE — FLOWSHEET NOTE
reassessments and prior notes for documentation efficiency.    Note: If patient does not return for scheduled/recommended follow up visits, this note will serve as a discharge from care along with the most recent update on progress.

## 2024-08-16 ENCOUNTER — HOSPITAL ENCOUNTER (OUTPATIENT)
Dept: PHYSICAL THERAPY | Age: 64
Setting detail: THERAPIES SERIES
Discharge: HOME | End: 2024-08-16
Payer: COMMERCIAL

## 2024-08-16 PROCEDURE — 97140 MANUAL THERAPY 1/> REGIONS: CPT

## 2024-08-16 NOTE — FLOWSHEET NOTE
[facility] - Outpatient Rehabilitation and Therapy [address] [place] [address] office: [phone] fax: [phone]       Physical Therapy: TREATMENT/PROGRESS NOTE   Patient: Kit Vergara (63 y.o. male)   Examination Date: 2024   :  1960 MRN: [id number]   Visit #: 8   Insurance Allowable Auth Needed   [place], 60/yr  (7 of 13 approved 2024) [x]Yes    []No    Insurance: Payor: BCBS / Plan: BCBS - [place] PPO / Product Type: *No Product type* /   Insurance ID: [id number] - ([place] [employer])  Secondary Insurance (if applicable):    Treatment Diagnosis: low back pain M54.5, debility and weakness R53.1, decreased endurance R53.83  No diagnosis found.   Medical Diagnosis:  Low back pain [M54.50], debility and weakness R53.1   Referring Physician: Brent Schneider APRN *  PCP: Rafael Fisher MD     Plan of care signed (Y/N):     Date of Patient follow up with Physician:      Progress Report/POC: NO  POC update due: (10 visits /OR AUTH LIMITS, whichever is less)  2024                                             Precautions/ Contra-indications:           Latex allergy:  NO  Pacemaker:    NO  Contraindications for Manipulation: recent surgical history (relative)  Date of Surgery: 2 weeks ago  Other:    Red Flags:  Fevers, chills, night sweats  Visceral pain    C-SSRS Triggered by Intake questionnaire:   Patient answered 'NO' to both behavioral questions on intake.  No further screening warranted    Preferred Language for Healthcare:   [x] English       [] other:    SUBJECTIVE EXAMINATION     Patient stated complaint: Patient states he felt pretty good after last session. Did more shoveling yesterday. Is more locked up in his neck and mid upper back. Feels some between shoulder blades and out to shoulder. Low back is a bit tight as well.       Test used Initial score  2024   Pain Summary VAS 3/10 2/10 mid back   Functional questionnaire Modified Oswestry 42%

## 2024-08-21 ENCOUNTER — HOSPITAL ENCOUNTER (OUTPATIENT)
Dept: PHYSICAL THERAPY | Age: 64
Setting detail: THERAPIES SERIES
Discharge: HOME | End: 2024-08-21
Payer: COMMERCIAL

## 2024-08-21 PROCEDURE — 97140 MANUAL THERAPY 1/> REGIONS: CPT

## 2024-08-21 PROCEDURE — 97110 THERAPEUTIC EXERCISES: CPT

## 2024-08-21 NOTE — FLOWSHEET NOTE
Met: [] Adjusted  5. Patient will report being able to return to playing basketball without increased symptoms or restriction. (patient specific functional goal)    [] Progressing: [] Met: [] Not Met: [] Adjusted     Overall Progression Towards Functional goals/ Treatment Progress Update:  [] Patient is progressing as expected towards functional goals listed.    [] Progression is slowed due to complexities/Impairments listed.  [] Progression has been slowed due to co-morbidities.  [x] Plan just implemented, too soon (<30days) to assess goals progression   [] Goals require adjustment due to lack of progress  [] Patient is not progressing as expected and requires additional follow up with physician  [] Other:     TREATMENT PLAN     Frequency/Duration: 2x/week for 8 weeks for the following treatment interventions:    Interventions:  Therapeutic Exercise (95633) including: strength training, ROM, and functional mobility  Therapeutic Activities (65338) including: functional mobility training and education.  Neuromuscular Re-education (71672) activation and proprioception, including postural re-education.    Gait Training (15757) for normalization of ambulation patterns and AD training.   Manual Therapy (97505) as indicated to include: Passive Range of Motion, Gr I-IV mobilizations, Grade V Manipulation, Soft Tissue Mobilization, and Trigger Point Release  Modalities as needed that may include: NONE  Patient education on joint protection, postural re-education, activity modification, and progression of HEP    Plan: Cont POC- Continue emphasis/focus on exercise progression, improving proper muscle recruitment and activation/motor control patterns, modulating pain, promoting relaxation, static and dynamic balance, and improving postural awareness. Next visit plan to progress weights, progress reps, add new exercises, and progress balance     Electronically Signed by Ingrid Moore PT  Date: 08/21/2024     Note: Portions of

## 2024-08-27 ENCOUNTER — APPOINTMENT (OUTPATIENT)
Dept: PHYSICAL THERAPY | Age: 64
End: 2024-08-27
Payer: COMMERCIAL

## 2024-09-03 ENCOUNTER — HOSPITAL ENCOUNTER (OUTPATIENT)
Dept: PHYSICAL THERAPY | Age: 64
Setting detail: THERAPIES SERIES
Discharge: HOME | End: 2024-09-03
Payer: COMMERCIAL

## 2024-09-03 PROCEDURE — 97110 THERAPEUTIC EXERCISES: CPT

## 2024-09-03 PROCEDURE — 97140 MANUAL THERAPY 1/> REGIONS: CPT

## 2024-09-03 NOTE — FLOWSHEET NOTE
Oswestry 42% disability    Other:                  OBJECTIVE EXAMINATION     7/1/24  ROM/Strength: (Blank cells denote NT)      Mvmt (norm) AROM L AROM R Notes PROM L PROM R Notes     LUMBAR Flex (90) 70        Ext (25) 10        SB (25) 8 15        Rotation (30) 50% limited wnl           HIP Flex (120) 125 125        Abd (45) 30 40        ER (50) 38 35        IR (45) 8 32        Hamstring flex 65 55           MMT L          MMT  R Notes     LUMBAR  Flexion       Extension       Lateral flexion        Rotation          MMT L MMT R Notes       HIP  Flexion 18.1 18.8      Abduction 19.6 23.0      ER        IR        Extension 22.3 14.0    KNEE Flex    43.2  44.6      Repeated Movements: [] Normal  [] Abnormal [] N/A    Palpation:   Patient reported tenderness with palpation  Location:mid - low thoracic spine into upper lumbar spine    Posture:   Mild forward flexed posture    Bandages/Dressings/Incisions:  Patients wound/incision appears to be healing as expected    Dermatomes: Abnormal findings listed below  All WNL    Myotomes: Abnormal findings listed below  All WNL    Reflexes: Abnormal findings listed below  All reflexes WNL (2+)    Specific Joint Mobility Testing/Accessory Motions:      Thoracic: hypomobility of T3 T4 T5 T6 T7 T8 T9 T10 T11 T12  Lumbar: hypomobility of L1 L2    Special Tests:  N/A    Gait:    Pattern: WNL  Assistive Device Used: no AD    Balance:  [x] WNL      [] NT       [] Dysfunction noted  Comment:     Falls Risk Assessment (30 days):   Falls Risk assessed and no intervention required.  Time Up and Go (TUG):   Not Assessed        Exercises/Interventions     Therapeutic Ex (27093) 13 resistance Sets/time Reps Notes/Cues/Progressions   Bike No resistance      Open book  1 10/ea    Supine flexion AAROM 5#  10    Supine flexion press 5#  10    [place]       STS Pink ball      Multifidus chop green      Slide lunge at wall       Bridge c ball squeeze       Bosu lunge    bilateral   Side step

## 2024-09-10 ENCOUNTER — HOSPITAL ENCOUNTER (OUTPATIENT)
Dept: PHYSICAL THERAPY | Age: 64
Setting detail: THERAPIES SERIES
Discharge: HOME | End: 2024-09-10
Payer: COMMERCIAL

## 2024-09-10 PROCEDURE — 97140 MANUAL THERAPY 1/> REGIONS: CPT

## 2024-09-26 ENCOUNTER — APPOINTMENT (OUTPATIENT)
Dept: PHYSICAL THERAPY | Age: 64
End: 2024-09-26
Payer: COMMERCIAL